# Patient Record
Sex: MALE | Race: WHITE | NOT HISPANIC OR LATINO | Employment: OTHER | URBAN - NONMETROPOLITAN AREA
[De-identification: names, ages, dates, MRNs, and addresses within clinical notes are randomized per-mention and may not be internally consistent; named-entity substitution may affect disease eponyms.]

---

## 2017-09-29 ENCOUNTER — OFFICE VISIT (OUTPATIENT)
Dept: URGENT CARE | Facility: PHYSICIAN GROUP | Age: 52
End: 2017-09-29
Payer: COMMERCIAL

## 2017-09-29 VITALS
OXYGEN SATURATION: 97 % | DIASTOLIC BLOOD PRESSURE: 90 MMHG | HEIGHT: 71 IN | SYSTOLIC BLOOD PRESSURE: 140 MMHG | HEART RATE: 92 BPM | RESPIRATION RATE: 14 BRPM | WEIGHT: 238 LBS | TEMPERATURE: 97.4 F | BODY MASS INDEX: 33.32 KG/M2

## 2017-09-29 DIAGNOSIS — I15.9 SECONDARY HYPERTENSION: ICD-10-CM

## 2017-09-29 PROCEDURE — 99203 OFFICE O/P NEW LOW 30 MIN: CPT | Performed by: PHYSICIAN ASSISTANT

## 2017-09-29 RX ORDER — LISINOPRIL AND HYDROCHLOROTHIAZIDE 25; 20 MG/1; MG/1
1 TABLET ORAL DAILY
COMMUNITY
End: 2017-10-26 | Stop reason: SDUPTHER

## 2017-09-29 RX ORDER — LISINOPRIL AND HYDROCHLOROTHIAZIDE 25; 20 MG/1; MG/1
1 TABLET ORAL DAILY
Qty: 30 TAB | Refills: 0 | Status: SHIPPED | OUTPATIENT
Start: 2017-09-29 | End: 2017-11-26

## 2017-09-29 NOTE — PROGRESS NOTES
Chief Complaint   Patient presents with   • Medication Refill     BP Meds       HISTORY OF PRESENT ILLNESS: Patient is a 52 y.o. male who presents today for the following:    Patient comes in for a blood pressure medication refill. He has been on lisinopril/hydrochlorothiazide 20/25 for the past few years. He tolerates the medication without any side effects. He took his last pill today but does not have an appointment until 10/9/17. He denies any chest pain, short of breath, orthopnea, PND, leg swelling.    There are no active problems to display for this patient.      Allergies:Review of patient's allergies indicates no known allergies.    Current Outpatient Prescriptions Ordered in [facility]   Medication Sig Dispense Refill   • lisinopril-hydrochlorothiazide (PRINZIDE, ZESTORETIC) 20-25 MG per tablet Take 1 Tab by mouth every day.     • lisinopril-hydrochlorothiazide (PRINZIDE, ZESTORETIC) 20-25 MG per tablet Take 1 Tab by mouth every day. 30 Tab 0     No current Epic-ordered facility-administered medications on file.        No past medical history on file.    Social History   Substance Use Topics   • Smoking status: Never Smoker   • Smokeless tobacco: Never Used   • Alcohol use Yes       No family status information on file.   History reviewed. No pertinent family history.    ROS:    Review of Systems   Constitutional: Negative for fever, chills, weight loss and malaise/fatigue.   HENT: Negative for ear pain, nosebleeds, congestion, sore throat and neck pain.    Eyes: Negative for blurred vision.   Respiratory: Negative for cough, sputum production, shortness of breath and wheezing.    Cardiovascular: Negative for chest pain, palpitations, orthopnea and leg swelling.   Gastrointestinal: Negative for heartburn, nausea, vomiting and abdominal pain.   Genitourinary: Negative for dysuria, urgency and frequency.       Exam:  Blood pressure 140/90, pulse 92, temperature 36.3 °C (97.4 °F), resp. rate 14, height 1.803 m (5'  "11\"), weight 108 kg (238 lb), SpO2 97 %.  General: Well developed, well nourished. No distress.  HEENT:Head is grossly normal.  Neck: Trachea midline, no masses. No thyromegaly. No carotid bruits noted.  Pulmonary: Clear to ausculation and percussion.  Normal effort. No rales, ronchi, or wheezing.   Cardiovascular: Regular rate and rhythm without murmur. No edema.   Neurologic: Grossly nonfocal.  Extremities: No lower extremity edema noted.  Skin: Warm, dry, good turgor. No rashes in visible areas.   Psych: Normal mood. Alert and oriented x3. Judgment and insight is normal.    Assessment/Plan:  Refill blood pressure medication for one month. Establish 10/9 scheduled.  1. Secondary hypertension  lisinopril-hydrochlorothiazide (PRINZIDE, ZESTORETIC) 20-25 MG per tablet       "

## 2017-10-26 RX ORDER — LISINOPRIL AND HYDROCHLOROTHIAZIDE 25; 20 MG/1; MG/1
1 TABLET ORAL DAILY
Qty: 30 TAB | Refills: 0 | Status: SHIPPED | OUTPATIENT
Start: 2017-10-26 | End: 2017-11-09

## 2017-10-26 NOTE — TELEPHONE ENCOUNTER
1 month refilled.     Appointment Information   Name: BREANNA AVILES MRN: [id number]   Date: 11/9/2017 Status: Mabel   Appt Time: 10:20 AM Length: 40   Visit Type: NEW PATIENT [[id number]] Copay: $0.00   Provider: Brisa Cho M.D. Department: [facility]   Referral Number:   Referral Status:     Notes: new to you   Made On: 9/25/2017 1:05 PM By: SARAY OSWALD [[id number]] (ES)   Appointment Instructions    Patient Instructions    Visit Type: NEW PATIENT - Please bring Photo ID, Insurance Cards, All Medication Bottles and copies of any legal documents (such as Living Will, Power of )  If speaking a language besides English please bring an adult .  Please arrive 30 minutes prior for check in and registration.  You will be receiving a confirmation call a few days before your appointment from our automated call confirmation system.    Panel:      Directions to Department    Department Location: [facility] - From [address], take [address] toward [place]   Turn slight right onto [id number]   Turn right onto [address]. Uzair Hurtado, PharmD

## 2017-10-26 NOTE — TELEPHONE ENCOUNTER
*Last OV of 09/29/17 was at an urgent care, pt has an upcoming appointment on 11/09/2017*  Was the patient seen in the last year in this department? No     Does patient have an active prescription for medications requested? No     Received Request Via: Pharmacy      Pt met protocol?: No Last OV 09/29/17  BP Readings from Last 1 Encounters:   09/29/17 140/90

## 2017-11-09 ENCOUNTER — OFFICE VISIT (OUTPATIENT)
Dept: MEDICAL GROUP | Facility: PHYSICIAN GROUP | Age: 52
End: 2017-11-09
Payer: COMMERCIAL

## 2017-11-09 ENCOUNTER — TELEPHONE (OUTPATIENT)
Dept: MEDICAL GROUP | Facility: PHYSICIAN GROUP | Age: 52
End: 2017-11-09

## 2017-11-09 VITALS
OXYGEN SATURATION: 98 % | SYSTOLIC BLOOD PRESSURE: 142 MMHG | BODY MASS INDEX: 34.02 KG/M2 | HEIGHT: 71 IN | TEMPERATURE: 98.3 F | HEART RATE: 89 BPM | DIASTOLIC BLOOD PRESSURE: 88 MMHG | WEIGHT: 243 LBS | RESPIRATION RATE: 16 BRPM

## 2017-11-09 DIAGNOSIS — Z11.59 ENCOUNTER FOR HEPATITIS C SCREENING TEST FOR LOW RISK PATIENT: ICD-10-CM

## 2017-11-09 DIAGNOSIS — R21 SKIN RASH: ICD-10-CM

## 2017-11-09 DIAGNOSIS — Z23 NEED FOR VACCINATION: ICD-10-CM

## 2017-11-09 DIAGNOSIS — Z79.891 CHRONIC USE OF OPIATE DRUGS THERAPEUTIC PURPOSES: ICD-10-CM

## 2017-11-09 DIAGNOSIS — E66.9 OBESITY (BMI 30-39.9): ICD-10-CM

## 2017-11-09 DIAGNOSIS — Z13.220 SCREENING FOR LIPID DISORDERS: ICD-10-CM

## 2017-11-09 DIAGNOSIS — I10 ESSENTIAL HYPERTENSION: ICD-10-CM

## 2017-11-09 PROCEDURE — 99214 OFFICE O/P EST MOD 30 MIN: CPT | Performed by: FAMILY MEDICINE

## 2017-11-09 RX ORDER — CLINDAMYCIN PHOSPHATE 11.9 MG/ML
SOLUTION TOPICAL
Qty: 1 BOTTLE | Refills: 3 | Status: SHIPPED | OUTPATIENT
Start: 2017-11-09 | End: 2019-04-19

## 2017-11-09 RX ORDER — HYDROCODONE BITARTRATE AND ACETAMINOPHEN 5; 325 MG/1; MG/1
1 TABLET ORAL
Qty: 90 TAB | Refills: 0 | Status: SHIPPED | OUTPATIENT
Start: 2017-11-09 | End: 2019-04-19

## 2017-11-09 RX ORDER — HYDROXYZINE 50 MG/1
50 TABLET, FILM COATED ORAL 3 TIMES DAILY PRN
Qty: 30 TAB | Refills: 3 | Status: SHIPPED | OUTPATIENT
Start: 2017-11-09 | End: 2019-04-19

## 2017-11-09 RX ORDER — HYDROCODONE BITARTRATE AND ACETAMINOPHEN 5; 325 MG/1; MG/1
1-2 TABLET ORAL EVERY 4 HOURS PRN
COMMUNITY
End: 2017-11-09 | Stop reason: SDUPTHER

## 2017-11-09 RX ORDER — CHLORHEXIDINE GLUCONATE 2 G/100ML
SOLUTION TOPICAL
Qty: 1 BOTTLE | Refills: 3 | Status: SHIPPED | OUTPATIENT
Start: 2017-11-09 | End: 2019-04-19

## 2017-11-09 ASSESSMENT — PATIENT HEALTH QUESTIONNAIRE - PHQ9: CLINICAL INTERPRETATION OF PHQ2 SCORE: 0

## 2017-11-09 NOTE — ASSESSMENT & PLAN NOTE
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   He has occasionally severe pain that he uses a norco 5/325 for  He does not use this on a daily basis and states that one rx will last a long time. Last rx was in march 2016.   Refill done today, if he needs further refills will check UDS and sign controlled substance agreement.     reviewed with no concerns.

## 2017-11-09 NOTE — ASSESSMENT & PLAN NOTE
Skin rash that started in summer in groin region, now spread over all body surfaces but not on his face.   Very itchy lesions which he has scratches till many are open.   maculopalar rash over arms, legs, abdomen, back.   Gets worse in hot shower and at bedtime. Symptoms not worse only at bedtime. Lesions not located in creases of fingers or concentrated at wrists.   Will treat with antihistamine, topical steroid to reduce inflammation and itching.   Will check baseline labs for TSH, blood sugars.    No new soaps, detergent or lotions. No changes in diet. No camping or travel.

## 2017-11-09 NOTE — ASSESSMENT & PLAN NOTE
Chronic condition, controlled on lisinopril hctz 20-25 daily  He has no chest pain or swelling in legs.   He is advised on weight loss, low salt diet, and regular exercise

## 2017-11-09 NOTE — PROGRESS NOTES
Subjective:   Chuy Moody is a 52 y.o. male here today for evaluation and management of:     Chronic use of opiate drugs therapeutic purposes  He has damaged discs in cervical spine.   He has occasionally severe pain that he uses a norco 5/325 for  He does not use this on a daily basis and states that one rx will last a long time. Last rx was in march 2016.   Refill done today, if he needs further refills will check UDS and sign controlled substance agreement.     reviewed with no concerns.         Essential hypertension  Chronic condition, controlled on lisinopril hctz 20-25 daily  He has no chest pain or swelling in legs.   He is advised on weight loss, low salt diet, and regular exercise    Skin rash  Skin rash that started in summer in groin region, now spread over all body surfaces but not on his face.   Very itchy lesions which he has scratches till many are open.   maculopalar rash over arms, legs, abdomen, back.   Gets worse in hot shower and at bedtime. Symptoms not worse only at bedtime. Lesions not located in creases of fingers or concentrated at wrists.   Will treat with antihistamine, topical steroid to reduce inflammation and itching.   Will check baseline labs for TSH, blood sugars.    No new soaps, detergent or lotions. No changes in diet. No camping or travel.      If labs all normal, will try permethrin, if no resolution, will try oral steroid burst and d/c one medication at a time to see if it is a medication reaction.     Current medicines (including changes today)  Current Outpatient Prescriptions   Medication Sig Dispense Refill   • hydrocodone-acetaminophen (NORCO) 5-325 MG Tab per tablet Take 1 Tab by mouth 1 time daily as needed. 90 Tab 0   • Chlorhexidine Gluconate 2 % Liquid Wash affected skin with medication once a day 1 Bottle 3   • clindamycin (CLEOCIN) 1 % Solution Use 5 ml to wash skin 1-2 times daily. 1 Bottle 3   • hydrOXYzine HCl (ATARAX) 50 MG Tab Take 1 Tab by mouth 3  "times a day as needed for Itching. 30 Tab 3   • lisinopril-hydrochlorothiazide (PRINZIDE, ZESTORETIC) 20-25 MG per tablet Take 1 Tab by mouth every day. 30 Tab 0     No current facility-administered medications for this visit.      He  has no past medical history on file.    ROS  No chest pain, no shortness of breath, no abdominal pain       Objective:     Blood pressure 142/88, pulse 89, temperature 36.8 °C (98.3 °F), resp. rate 16, height 1.803 m (5' 11\"), weight 110.2 kg (243 lb), SpO2 98 %. Body mass index is 33.89 kg/m².   Physical Exam:  Constitutional: Alert, no distress.  Skin: Warm, dry, good turgor, papular excoriated lesions with underlying   maculopalar rash over arms, legs, abdomen, back.     Eye: Equal, round and reactive, conjunctiva clear, lids normal.  ENMT: Lips without lesions, good dentition, oropharynx clear.  Neck: Trachea midline, no masses, no thyromegaly. No cervical or supraclavicular lymphadenopathy  Respiratory: Unlabored respiratory effort, lungs clear to auscultation, no wheezes, no ronchi.  Cardiovascular: Normal S1, S2, no murmur, no edema.  Abdomen: Soft, non-tender, no masses, no hepatosplenomegaly.  Psych: Alert and oriented x3, normal affect and mood.        Assessment and Plan:   The following treatment plan was discussed    1. Chronic use of opiate drugs therapeutic purposes  Refill done today for 90 pills of norco 5/325. Advised he cannot take with alcohol or benzodiazepines.   - CONTROLLED SUBSTANCE TREATMENT AGREEMENT    2. Essential hypertension  Chronic condition, controlled on lisinopril-hctz  - COMP METABOLIC PANEL; Future    3. Skin rash  Uncontrolled   Check baseline labs.   - TSH WITH REFLEX TO FT4; Future  - CBC WITH DIFFERENTIAL; Future  - COMP METABOLIC PANEL; Future  - HEP C VIRUS ANTIBODY; Future    4. Need for vaccination  - INFLUENZA VACCINE QUAD INJ >3Y(PF)    5. Obesity (BMI 30-39.9)  - Patient identified as having weight management issue.  Appropriate orders " and counseling given.  - LIPID PROFILE; Future  - TSH WITH REFLEX TO FT4; Future    6. Encounter for hepatitis C screening test for low risk patient  - HEP C VIRUS ANTIBODY; Future    7. Screening for lipid disorders  - LIPID PROFILE; Future      Followup: Return in about 3 months (around 2/9/2018) for HTN, rash, chronic neck pain.

## 2017-11-26 RX ORDER — LISINOPRIL AND HYDROCHLOROTHIAZIDE 25; 20 MG/1; MG/1
TABLET ORAL
Qty: 90 TAB | Refills: 0 | Status: SHIPPED | OUTPATIENT
Start: 2017-11-26 | End: 2018-02-22 | Stop reason: SDUPTHER

## 2018-05-22 NOTE — TELEPHONE ENCOUNTER
Was the patient seen in the last year in this department? Yes     Does patient have an active prescription for medications requested? No     Received Request Via: Pharmacy      Pt met protocol?: Yes    OV 11/17    BP Readings from Last 1 Encounters:   11/09/17 142/88

## 2018-05-23 RX ORDER — LISINOPRIL AND HYDROCHLOROTHIAZIDE 25; 20 MG/1; MG/1
TABLET ORAL
Qty: 30 TAB | Refills: 0 | Status: SHIPPED | OUTPATIENT
Start: 2018-05-23 | End: 2018-07-03 | Stop reason: SDUPTHER

## 2018-05-23 NOTE — TELEPHONE ENCOUNTER
Pt was told 2/18 to make appt and that has not been done. Please advise pt only 30 days will be sent to pharmacy and next request will be denied.

## 2018-06-21 NOTE — TELEPHONE ENCOUNTER
*Note on last rx for patient to make appointment for further refills*  Was the patient seen in the last year in this department? Yes     Does patient have an active prescription for medications requested? No     Received Request Via: Pharmacy    Pt met protocol?: No     Last OV 11/2017  BP Readings from Last 1 Encounters:   11/09/17 142/88

## 2018-06-22 RX ORDER — LISINOPRIL AND HYDROCHLOROTHIAZIDE 25; 20 MG/1; MG/1
TABLET ORAL
Refills: 0 | OUTPATIENT
Start: 2018-06-22

## 2018-06-22 NOTE — TELEPHONE ENCOUNTER
Patient has been told on multiple occasions to make appt and that has not happened. Med is denied and pharmacy is aware.

## 2018-06-25 ENCOUNTER — OFFICE VISIT (OUTPATIENT)
Dept: MEDICAL GROUP | Facility: PHYSICIAN GROUP | Age: 53
End: 2018-06-25
Payer: COMMERCIAL

## 2018-06-25 VITALS
HEART RATE: 78 BPM | DIASTOLIC BLOOD PRESSURE: 78 MMHG | SYSTOLIC BLOOD PRESSURE: 132 MMHG | RESPIRATION RATE: 16 BRPM | BODY MASS INDEX: 34.72 KG/M2 | TEMPERATURE: 97.6 F | OXYGEN SATURATION: 97 % | HEIGHT: 71 IN | WEIGHT: 248 LBS

## 2018-06-25 DIAGNOSIS — E78.5 DYSLIPIDEMIA: ICD-10-CM

## 2018-06-25 DIAGNOSIS — Z12.11 COLON CANCER SCREENING: ICD-10-CM

## 2018-06-25 DIAGNOSIS — Z23 NEED FOR TDAP VACCINATION: ICD-10-CM

## 2018-06-25 DIAGNOSIS — E66.9 OBESITY (BMI 30-39.9): ICD-10-CM

## 2018-06-25 DIAGNOSIS — I10 ESSENTIAL HYPERTENSION: ICD-10-CM

## 2018-06-25 PROBLEM — R21 SKIN RASH: Status: RESOLVED | Noted: 2017-11-09 | Resolved: 2018-06-25

## 2018-06-25 PROCEDURE — 99214 OFFICE O/P EST MOD 30 MIN: CPT | Mod: 25 | Performed by: FAMILY MEDICINE

## 2018-06-25 PROCEDURE — 90472 IMMUNIZATION ADMIN EACH ADD: CPT | Performed by: FAMILY MEDICINE

## 2018-06-25 PROCEDURE — 90471 IMMUNIZATION ADMIN: CPT | Performed by: FAMILY MEDICINE

## 2018-06-25 PROCEDURE — 90715 TDAP VACCINE 7 YRS/> IM: CPT | Performed by: FAMILY MEDICINE

## 2018-06-25 ASSESSMENT — PATIENT HEALTH QUESTIONNAIRE - PHQ9: CLINICAL INTERPRETATION OF PHQ2 SCORE: 0

## 2018-06-25 NOTE — ASSESSMENT & PLAN NOTE
Chronic condition, well controlled. Takes lisinopril-hctz 20-25 mg.   He has no chest pain or swelling on his legs.

## 2018-06-25 NOTE — PROGRESS NOTES
"Subjective:   Chuy Moody is a 53 y.o. male here today for evaluation and management of:     Essential hypertension  Chronic condition, well controlled. Takes lisinopril-hctz 20-25 mg.   He has no chest pain or swelling on his legs.     Obesity (BMI 30-39.9)  Encouraged weight loss, regular exercise.          Current medicines (including changes today)  Current Outpatient Prescriptions   Medication Sig Dispense Refill   • lisinopril-hydrochlorothiazide (PRINZIDE, ZESTORETIC) 20-25 MG per tablet TAKE 1 TABLET BY MOUTH EVERY DAY 30 Tab 0   • Chlorhexidine Gluconate 2 % Liquid Wash affected skin with medication once a day 1 Bottle 3   • clindamycin (CLEOCIN) 1 % Solution Use 5 ml to wash skin 1-2 times daily. 1 Bottle 3   • hydrocodone-acetaminophen (NORCO) 5-325 MG Tab per tablet Take 1 Tab by mouth 1 time daily as needed. (Patient not taking: Reported on 6/25/2018) 90 Tab 0   • hydrOXYzine HCl (ATARAX) 50 MG Tab Take 1 Tab by mouth 3 times a day as needed for Itching. (Patient not taking: Reported on 6/25/2018) 30 Tab 3     No current facility-administered medications for this visit.      He  has no past medical history on file.    ROS  No chest pain, no shortness of breath, no abdominal pain       Objective:     Blood pressure 132/78, pulse 78, temperature 36.4 °C (97.6 °F), resp. rate 16, height 1.803 m (5' 11\"), weight 112.5 kg (248 lb), SpO2 97 %. Body mass index is 34.59 kg/m².   Physical Exam:  Constitutional: Alert, no distress.  Skin: Warm, dry, good turgor, no rashes in visible areas.  Eye: Equal, round and reactive, conjunctiva clear, lids normal.  ENMT: Lips without lesions, good dentition, oropharynx clear.  Neck: Trachea midline, no masses, no thyromegaly. No cervical or supraclavicular lymphadenopathy  Respiratory: Unlabored respiratory effort, lungs clear to auscultation, no wheezes, no ronchi.  Cardiovascular: Normal S1, S2, no murmur, no edema.  Abdomen: Soft, non-tender, no masses, no " hepatosplenomegaly.  Psych: Alert and oriented x3, normal affect and mood.        Assessment and Plan:   The following treatment plan was discussed    1. Need for Tdap vaccination  today    2. Essential hypertension  Well controlled.     3. Obesity (BMI 30-39.9)  Advised on diet changes, gradual weight loss.       Followup: No Follow-up on file.

## 2018-07-05 RX ORDER — LISINOPRIL AND HYDROCHLOROTHIAZIDE 25; 20 MG/1; MG/1
TABLET ORAL
Qty: 90 TAB | Refills: 0 | Status: SHIPPED | OUTPATIENT
Start: 2018-07-05 | End: 2018-10-01 | Stop reason: SDUPTHER

## 2018-07-05 NOTE — TELEPHONE ENCOUNTER
Was the patient seen in the last year in this department? Yes     Does patient have an active prescription for medications requested? No     Received Request Via: Pharmacy      Pt met protocol?: Yes    OV 6/18     BP Readings from Last 1 Encounters:   06/25/18 132/78

## 2018-09-26 ENCOUNTER — HOSPITAL ENCOUNTER (OUTPATIENT)
Dept: LAB | Facility: MEDICAL CENTER | Age: 53
End: 2018-09-26
Attending: FAMILY MEDICINE
Payer: COMMERCIAL

## 2018-09-26 DIAGNOSIS — I10 ESSENTIAL HYPERTENSION: ICD-10-CM

## 2018-09-26 DIAGNOSIS — E66.9 OBESITY (BMI 30-39.9): ICD-10-CM

## 2018-09-26 DIAGNOSIS — Z11.59 ENCOUNTER FOR HEPATITIS C SCREENING TEST FOR LOW RISK PATIENT: ICD-10-CM

## 2018-09-26 DIAGNOSIS — R21 SKIN RASH: ICD-10-CM

## 2018-09-26 DIAGNOSIS — E78.5 DYSLIPIDEMIA: ICD-10-CM

## 2018-09-26 LAB
BASOPHILS # BLD AUTO: 0.8 % (ref 0–1.8)
BASOPHILS # BLD: 0.04 K/UL (ref 0–0.12)
EOSINOPHIL # BLD AUTO: 0.09 K/UL (ref 0–0.51)
EOSINOPHIL NFR BLD: 1.9 % (ref 0–6.9)
ERYTHROCYTE [DISTWIDTH] IN BLOOD BY AUTOMATED COUNT: 38 FL (ref 35.9–50)
HCT VFR BLD AUTO: 45 % (ref 42–52)
HCV AB SER QL: NEGATIVE
HGB BLD-MCNC: 16.1 G/DL (ref 14–18)
IMM GRANULOCYTES # BLD AUTO: 0.02 K/UL (ref 0–0.11)
IMM GRANULOCYTES NFR BLD AUTO: 0.4 % (ref 0–0.9)
LYMPHOCYTES # BLD AUTO: 1.98 K/UL (ref 1–4.8)
LYMPHOCYTES NFR BLD: 41.3 % (ref 22–41)
MCH RBC QN AUTO: 31.5 PG (ref 27–33)
MCHC RBC AUTO-ENTMCNC: 35.8 G/DL (ref 33.7–35.3)
MCV RBC AUTO: 88.1 FL (ref 81.4–97.8)
MONOCYTES # BLD AUTO: 0.62 K/UL (ref 0–0.85)
MONOCYTES NFR BLD AUTO: 12.9 % (ref 0–13.4)
NEUTROPHILS # BLD AUTO: 2.04 K/UL (ref 1.82–7.42)
NEUTROPHILS NFR BLD: 42.7 % (ref 44–72)
NRBC # BLD AUTO: 0 K/UL
NRBC BLD-RTO: 0 /100 WBC
PLATELET # BLD AUTO: 289 K/UL (ref 164–446)
PMV BLD AUTO: 10.6 FL (ref 9–12.9)
RBC # BLD AUTO: 5.11 M/UL (ref 4.7–6.1)
TSH SERPL DL<=0.005 MIU/L-ACNC: 2.94 UIU/ML (ref 0.38–5.33)
WBC # BLD AUTO: 4.8 K/UL (ref 4.8–10.8)

## 2018-09-26 PROCEDURE — 80053 COMPREHEN METABOLIC PANEL: CPT

## 2018-09-26 PROCEDURE — 36415 COLL VENOUS BLD VENIPUNCTURE: CPT

## 2018-09-26 PROCEDURE — 84443 ASSAY THYROID STIM HORMONE: CPT

## 2018-09-26 PROCEDURE — 80061 LIPID PANEL: CPT

## 2018-09-26 PROCEDURE — 85025 COMPLETE CBC W/AUTO DIFF WBC: CPT

## 2018-09-26 PROCEDURE — 86803 HEPATITIS C AB TEST: CPT

## 2018-09-27 LAB
ALBUMIN SERPL BCP-MCNC: 4.6 G/DL (ref 3.2–4.9)
ALBUMIN/GLOB SERPL: 1.6 G/DL
ALP SERPL-CCNC: 47 U/L (ref 30–99)
ALT SERPL-CCNC: 17 U/L (ref 2–50)
ANION GAP SERPL CALC-SCNC: 14 MMOL/L (ref 0–11.9)
AST SERPL-CCNC: 14 U/L (ref 12–45)
BILIRUB SERPL-MCNC: 0.8 MG/DL (ref 0.1–1.5)
BUN SERPL-MCNC: 12 MG/DL (ref 8–22)
CALCIUM SERPL-MCNC: 10 MG/DL (ref 8.5–10.5)
CHLORIDE SERPL-SCNC: 95 MMOL/L (ref 96–112)
CHOLEST SERPL-MCNC: 349 MG/DL (ref 100–199)
CO2 SERPL-SCNC: 25 MMOL/L (ref 20–33)
CREAT SERPL-MCNC: 0.98 MG/DL (ref 0.5–1.4)
FASTING STATUS PATIENT QL REPORTED: NORMAL
GLOBULIN SER CALC-MCNC: 2.9 G/DL (ref 1.9–3.5)
GLUCOSE SERPL-MCNC: 297 MG/DL (ref 65–99)
HDLC SERPL-MCNC: 50 MG/DL
LDLC SERPL CALC-MCNC: ABNORMAL MG/DL
POTASSIUM SERPL-SCNC: 3.9 MMOL/L (ref 3.6–5.5)
PROT SERPL-MCNC: 7.5 G/DL (ref 6–8.2)
SODIUM SERPL-SCNC: 134 MMOL/L (ref 135–145)
TRIGL SERPL-MCNC: 449 MG/DL (ref 0–149)

## 2018-10-02 RX ORDER — LISINOPRIL AND HYDROCHLOROTHIAZIDE 25; 20 MG/1; MG/1
TABLET ORAL
Qty: 90 TAB | Refills: 0 | Status: SHIPPED | OUTPATIENT
Start: 2018-10-02 | End: 2019-01-02 | Stop reason: SDUPTHER

## 2018-10-02 NOTE — TELEPHONE ENCOUNTER
Was the patient seen in the last year in this department? Yes    Does patient have an active prescription for medications requested? No     Received Request Via: Pharmacy      Pt met protocol?: Yes    LAST OV 06/25/2018    BP Readings from Last 1 Encounters:   06/25/18 132/78     Lab Results   Component Value Date/Time    CHOLSTRLTOT 349 (H) 09/26/2018 10:33 AM    LDL see below 09/26/2018 10:33 AM    HDL 50 09/26/2018 10:33 AM    TRIGLYCERIDE 449 (H) 09/26/2018 10:33 AM       Lab Results   Component Value Date/Time    SODIUM 134 (L) 09/26/2018 10:33 AM    POTASSIUM 3.9 09/26/2018 10:33 AM    CHLORIDE 95 (L) 09/26/2018 10:33 AM    CO2 25 09/26/2018 10:33 AM    GLUCOSE 297 (H) 09/26/2018 10:33 AM    BUN 12 09/26/2018 10:33 AM    CREATININE 0.98 09/26/2018 10:33 AM     Lab Results   Component Value Date/Time    ALKPHOSPHAT 47 09/26/2018 10:33 AM    ASTSGOT 14 09/26/2018 10:33 AM    ALTSGPT 17 09/26/2018 10:33 AM    TBILIRUBIN 0.8 09/26/2018 10:33 AM

## 2018-10-08 DIAGNOSIS — R73.01 ELEVATED FASTING GLUCOSE: ICD-10-CM

## 2019-01-02 NOTE — TELEPHONE ENCOUNTER
Was the patient seen in the last year in this department? Yes    Does patient have an active prescription for medications requested? No     Received Request Via: Pharmacy      Pt met protocol?: No, OV 6/18   BP Readings from Last 1 Encounters:   06/25/18 132/78

## 2019-01-03 RX ORDER — LISINOPRIL AND HYDROCHLOROTHIAZIDE 25; 20 MG/1; MG/1
1 TABLET ORAL
Qty: 90 TAB | Refills: 0 | Status: SHIPPED | OUTPATIENT
Start: 2019-01-03 | End: 2019-04-02 | Stop reason: SDUPTHER

## 2019-04-02 RX ORDER — LISINOPRIL AND HYDROCHLOROTHIAZIDE 25; 20 MG/1; MG/1
1 TABLET ORAL
Qty: 90 TAB | Refills: 1 | Status: SHIPPED | OUTPATIENT
Start: 2019-04-02 | End: 2019-09-26 | Stop reason: SDUPTHER

## 2019-04-02 NOTE — TELEPHONE ENCOUNTER
Refill X 6 months, sent to pharmacy.Pt. Seen in the last 6 months per protocol.   Lab Results   Component Value Date/Time    SODIUM 134 (L) 09/26/2018 10:33 AM    POTASSIUM 3.9 09/26/2018 10:33 AM    CHLORIDE 95 (L) 09/26/2018 10:33 AM    CO2 25 09/26/2018 10:33 AM    GLUCOSE 297 (H) 09/26/2018 10:33 AM    BUN 12 09/26/2018 10:33 AM    CREATININE 0.98 09/26/2018 10:33 AM

## 2019-04-19 ENCOUNTER — OFFICE VISIT (OUTPATIENT)
Dept: URGENT CARE | Facility: PHYSICIAN GROUP | Age: 54
End: 2019-04-19
Payer: COMMERCIAL

## 2019-04-19 VITALS
OXYGEN SATURATION: 97 % | BODY MASS INDEX: 33.47 KG/M2 | HEART RATE: 92 BPM | SYSTOLIC BLOOD PRESSURE: 150 MMHG | TEMPERATURE: 97.8 F | DIASTOLIC BLOOD PRESSURE: 90 MMHG | RESPIRATION RATE: 16 BRPM | WEIGHT: 240 LBS

## 2019-04-19 DIAGNOSIS — H66.91 ACUTE RIGHT OTITIS MEDIA: ICD-10-CM

## 2019-04-19 PROCEDURE — 99214 OFFICE O/P EST MOD 30 MIN: CPT | Performed by: FAMILY MEDICINE

## 2019-04-19 RX ORDER — AMOXICILLIN 875 MG/1
TABLET, COATED ORAL
Qty: 20 TAB | Refills: 0 | Status: SHIPPED | OUTPATIENT
Start: 2019-04-19 | End: 2020-01-02

## 2019-04-19 NOTE — PROGRESS NOTES
Chief Complaint:    Chief Complaint   Patient presents with   • Nasal Congestion     (R) ear pressure-crackles, sharp pains randomly        History of Present Illness:    This is a new problem. For 4 days, his right ear has fluctuating pain from no pain to up to 6/10 severity. Right ear is plugged and can hear crackling in right ear intermittently. Overall, ear problem is staying same. He had URI symptoms that started about a week ago. He has had ear infection in past that started after URI, last episode about 25 years ago. Has tolerated Amoxil in past.      Review of Systems:    Constitutional: Negative for fever, chills, and diaphoresis.   Eyes: Negative for change in vision, photophobia, pain, redness, and discharge.  ENT: See HPI.    Respiratory: Negative for cough, hemoptysis, sputum production, shortness of breath, wheezing, and stridor.    Cardiovascular: Negative for chest pain, palpitations, orthopnea, claudication, leg swelling, and PND.   Gastrointestinal: Negative for abdominal pain, nausea, vomiting, diarrhea, constipation, blood in stool, and melena.   Genitourinary: Negative for dysuria, urinary urgency, urinary frequency, hematuria, and flank pain.   Musculoskeletal: Negative for myalgias, joint pain, neck pain, and back pain.   Skin: Negative for rash and itching.   Neurological: Negative for dizziness, tingling, tremors, sensory change, speech change, focal weakness, seizures, loss of consciousness, and headaches.   Endo: Negative for polydipsia.   Heme: Does not bruise/bleed easily.   Psychiatric/Behavioral: Negative for depression, suicidal ideas, hallucinations, memory loss and substance abuse. The patient is not nervous/anxious and does not have insomnia.        Past Medical History:    Past Medical History:   Diagnosis Date   • Hypertension      Past Surgical History:    History reviewed. No pertinent surgical history.    Social History:    Social History     Social History   • Marital status:  Single     Spouse name: N/A   • Number of children: N/A   • Years of education: N/A     Occupational History   • Not on file.     Social History Main Topics   • Smoking status: Never Smoker   • Smokeless tobacco: Never Used   • Alcohol use Yes   • Drug use: No   • Sexual activity: Not on file     Other Topics Concern   • Not on file     Social History Narrative   • No narrative on file     Family History:    History reviewed. No pertinent family history.    Medications:    Current Outpatient Prescriptions on File Prior to Visit   Medication Sig Dispense Refill   • lisinopril-hydrochlorothiazide (PRINZIDE, ZESTORETIC) 20-25 MG per tablet Take 1 Tab by mouth every day. 90 Tab 1     No current facility-administered medications on file prior to visit.      Allergies:    No Known Allergies      Vitals:    Vitals:    04/19/19 1301   BP: 150/90   Pulse: 92   Resp: 16   Temp: 36.6 °C (97.8 °F)   SpO2: 97%   Weight: 108.9 kg (240 lb)       Physical Exam:    Constitutional: Vital signs reviewed. Appears well-developed and well-nourished. No acute distress.   Eyes: Sclera white, conjunctivae clear.  ENT: Right TM is mildly-moderately erythematous. External ears normal. External auditory canals normal without discharge. Left TM translucent and non-bulging. Hearing normal. Nasal mucosa pink. Lips/teeth are normal. Oral mucosa pink and moist. Posterior pharynx: WNL.  Neck: Neck supple.   Pulmonary/Chest: Respirations non-labored.   Lymph: Cervical nodes without tenderness or enlargement.  Musculoskeletal: Normal gait. Normal range of motion. No muscular atrophy or weakness.  Neurological: Alert and oriented to person, place, and time. Muscle tone normal. Coordination normal.   Skin: No rashes or lesions. Warm, dry, normal turgor.  Psychiatric: Normal mood and affect. Behavior is normal. Judgment and thought content normal.       Assessment / Plan:    1. Acute right otitis media  - amoxicillin (AMOXIL) 875 MG tablet; 1 TAB BY  MOUTH TWICE A DAY X 10 DAYS.  Dispense: 20 Tab; Refill: 0      Discussed with him DDX, management options, and risks, benefits, and alternatives to treatment plan agreed upon.    Agreeable to medication prescribed.    May take over-the-counter Ibuprofen or Acetaminophen (Tylenol) as needed for pain.    Discussed expected course of duration, time for improvement, and to seek follow-up in Emergency Room, urgent care, or with PCP if getting worse at any time or not improving within expected time frame.

## 2019-09-26 RX ORDER — LISINOPRIL AND HYDROCHLOROTHIAZIDE 25; 20 MG/1; MG/1
TABLET ORAL
Qty: 30 TAB | Refills: 0 | Status: SHIPPED | OUTPATIENT
Start: 2019-09-26 | End: 2019-10-27 | Stop reason: SDUPTHER

## 2019-09-26 NOTE — TELEPHONE ENCOUNTER
*PT NEEDS TO MAKE AN APPT AND GET LABS UPDATED*  Was the patient seen in the last year in this department? No     Does patient have an active prescription for medications requested? No     Received Request Via: Pharmacy      Pt met protocol?: No      LAST OV 06/25/2018    BP Readings from Last 1 Encounters:   04/19/19 150/90     Lab Results   Component Value Date/Time    CHOLSTRLTOT 349 (H) 09/26/2018 10:33 AM    LDL see below 09/26/2018 10:33 AM    HDL 50 09/26/2018 10:33 AM    TRIGLYCERIDE 449 (H) 09/26/2018 10:33 AM       Lab Results   Component Value Date/Time    SODIUM 134 (L) 09/26/2018 10:33 AM    POTASSIUM 3.9 09/26/2018 10:33 AM    CHLORIDE 95 (L) 09/26/2018 10:33 AM    CO2 25 09/26/2018 10:33 AM    GLUCOSE 297 (H) 09/26/2018 10:33 AM    BUN 12 09/26/2018 10:33 AM    CREATININE 0.98 09/26/2018 10:33 AM     Lab Results   Component Value Date/Time    ALKPHOSPHAT 47 09/26/2018 10:33 AM    ASTSGOT 14 09/26/2018 10:33 AM    ALTSGPT 17 09/26/2018 10:33 AM    TBILIRUBIN 0.8 09/26/2018 10:33 AM

## 2019-10-28 ENCOUNTER — TELEPHONE (OUTPATIENT)
Dept: MEDICAL GROUP | Facility: PHYSICIAN GROUP | Age: 54
End: 2019-10-28

## 2019-10-28 NOTE — TELEPHONE ENCOUNTER
Was the patient seen in the last year in this department? No     Does patient have an active prescription for medications requested? Yes    Received Request Via: Patient     Scheduled appt with Pt at next available in January.

## 2019-10-28 NOTE — TELEPHONE ENCOUNTER
lisinopril-hydrochlorothiazide (PRINZIDE, ZESTORETIC) 20-25 MG per tablet     Pt is requesting a refill on Rx lisinopril-hydrochlorothiazide. Pt states he has 10 left as of today. I made him a PCP appt for the 1st available on January 2nd. If possible, please send to [facility]. Thank you

## 2019-10-29 RX ORDER — LISINOPRIL AND HYDROCHLOROTHIAZIDE 25; 20 MG/1; MG/1
TABLET ORAL
Qty: 90 TAB | Refills: 0 | Status: SHIPPED | OUTPATIENT
Start: 2019-10-29 | End: 2020-01-02

## 2020-01-02 ENCOUNTER — OFFICE VISIT (OUTPATIENT)
Dept: MEDICAL GROUP | Facility: PHYSICIAN GROUP | Age: 55
End: 2020-01-02
Payer: COMMERCIAL

## 2020-01-02 VITALS
TEMPERATURE: 98.4 F | DIASTOLIC BLOOD PRESSURE: 96 MMHG | OXYGEN SATURATION: 97 % | SYSTOLIC BLOOD PRESSURE: 140 MMHG | RESPIRATION RATE: 16 BRPM | HEART RATE: 97 BPM | BODY MASS INDEX: 33.32 KG/M2 | WEIGHT: 238 LBS | HEIGHT: 71 IN

## 2020-01-02 DIAGNOSIS — Z23 NEED FOR VACCINATION: ICD-10-CM

## 2020-01-02 DIAGNOSIS — E66.9 OBESITY (BMI 30-39.9): ICD-10-CM

## 2020-01-02 DIAGNOSIS — Z12.12 SCREENING FOR COLORECTAL CANCER: ICD-10-CM

## 2020-01-02 DIAGNOSIS — I10 ESSENTIAL HYPERTENSION: ICD-10-CM

## 2020-01-02 DIAGNOSIS — E78.5 DYSLIPIDEMIA: ICD-10-CM

## 2020-01-02 DIAGNOSIS — Z12.11 SCREENING FOR COLORECTAL CANCER: ICD-10-CM

## 2020-01-02 PROCEDURE — 90471 IMMUNIZATION ADMIN: CPT | Performed by: FAMILY MEDICINE

## 2020-01-02 PROCEDURE — 90686 IIV4 VACC NO PRSV 0.5 ML IM: CPT | Performed by: FAMILY MEDICINE

## 2020-01-02 PROCEDURE — 99214 OFFICE O/P EST MOD 30 MIN: CPT | Mod: 25 | Performed by: FAMILY MEDICINE

## 2020-01-02 RX ORDER — HYDROCHLOROTHIAZIDE 25 MG/1
25 TABLET ORAL DAILY
Qty: 90 TAB | Refills: 3 | Status: SHIPPED | OUTPATIENT
Start: 2020-01-02 | End: 2020-12-28 | Stop reason: SDUPTHER

## 2020-01-02 RX ORDER — LISINOPRIL 40 MG/1
40 TABLET ORAL DAILY
Qty: 90 TAB | Refills: 3 | Status: SHIPPED | OUTPATIENT
Start: 2020-01-02 | End: 2020-12-28 | Stop reason: SDUPTHER

## 2020-01-02 ASSESSMENT — PATIENT HEALTH QUESTIONNAIRE - PHQ9: CLINICAL INTERPRETATION OF PHQ2 SCORE: 0

## 2020-01-02 NOTE — PROGRESS NOTES
"Subjective:   Chuy Moody is a 54 y.o. male here today for evaluation and management of:     Essential hypertension  bp borderline elevated 140/96. Does not check BP at home usually well controlled.   Has no chest pain, no swelling in legs.   Advised low salt diet.   Takes lisinopril hctz 20-25 mg, increasing lisinopril 40 mg and continue on hctz 25 mg.     Obesity (BMI 30-39.9)  Improving! Encouraged gradual weight loss with decreased portion sizes.          Current medicines (including changes today)  Current Outpatient Medications   Medication Sig Dispense Refill   • lisinopril (PRINIVIL) 40 MG tablet Take 1 Tab by mouth every day. 90 Tab 3   • hydroCHLOROthiazide (HYDRODIURIL) 25 MG Tab Take 1 Tab by mouth every day. 90 Tab 3     No current facility-administered medications for this visit.      He  has a past medical history of Hypertension.    ROS  No chest pain, no shortness of breath, no abdominal pain       Objective:     /96 (BP Location: Right arm, Patient Position: Sitting, BP Cuff Size: Adult)   Pulse 97   Temp 36.9 °C (98.4 °F) (Temporal)   Resp 16   Ht 1.803 m (5' 11\")   Wt 108 kg (238 lb)   SpO2 97%  Body mass index is 33.19 kg/m².   Physical Exam:  Constitutional: Alert, no distress.  Skin: Warm, dry, good turgor, no rashes in visible areas.  Eye: Equal, round and reactive, conjunctiva clear, lids normal.  ENMT: Lips without lesions, good dentition, oropharynx clear.  Neck: Trachea midline, no masses, no thyromegaly. No cervical or supraclavicular lymphadenopathy  Respiratory: Unlabored respiratory effort, lungs clear to auscultation, no wheezes, no ronchi.  Cardiovascular: Normal S1, S2, no murmur, no edema.  Abdomen: Soft, non-tender, no masses, no hepatosplenomegaly.  Psych: Alert and oriented x3, normal affect and mood.        Assessment and Plan:   The following treatment plan was discussed    1. Screening for colorectal cancer  - Occult Blood Feces Immunoassay (FIT); " Future    2. Need for vaccination  - Influenza Vaccine Quad Injection (PF)    3. Essential hypertension  Increase lisinopril to 40 mg  Continue hctz 25  - Comp Metabolic Panel; Future    4. Obesity (BMI 30-39.9)  Advised diet changes, increased activity for gradual weight loss.     5. Dyslipidemia  - Lipid Profile; Future      Followup: Return in about 3 months (around 4/2/2020) for HTN, dyslipidemia.

## 2020-01-02 NOTE — ASSESSMENT & PLAN NOTE
bp borderline elevated 140/96. Does not check BP at home usually well controlled.   Has no chest pain, no swelling in legs.   Advised low salt diet.   Takes lisinopril hctz 20-25 mg, increasing lisinopril 40 mg and continue on hctz 25 mg.

## 2020-01-03 ENCOUNTER — HOSPITAL ENCOUNTER (OUTPATIENT)
Facility: MEDICAL CENTER | Age: 55
End: 2020-01-03
Attending: FAMILY MEDICINE
Payer: COMMERCIAL

## 2020-01-03 PROCEDURE — 82274 ASSAY TEST FOR BLOOD FECAL: CPT

## 2020-01-07 DIAGNOSIS — Z12.11 SCREENING FOR COLORECTAL CANCER: ICD-10-CM

## 2020-01-07 DIAGNOSIS — Z12.12 SCREENING FOR COLORECTAL CANCER: ICD-10-CM

## 2020-01-07 LAB — HEMOCCULT STL QL IA: NEGATIVE

## 2020-01-27 RX ORDER — LISINOPRIL AND HYDROCHLOROTHIAZIDE 25; 20 MG/1; MG/1
TABLET ORAL
Qty: 90 TAB | Refills: 0 | OUTPATIENT
Start: 2020-01-27

## 2020-01-27 NOTE — TELEPHONE ENCOUNTER
Pt was increased in dose in lisinopril to 40mg and kept pt at hctz 25mg so split med up instead of being on the combo. Pharmacy was made aware.

## 2020-01-27 NOTE — TELEPHONE ENCOUNTER
*PT NEEDS TO GET LABS DONE*  *PHARMACY REQUESTED LISINOPRIL-HYDROCHLOROTHIAZIDE 20/25MG, PT CURRENT HAS RX FOR JUST LISINOPRIL 40MG. WHICH AN RX HAS BEEN SENT TO SAME REQUESTING PHARMACY ON 01/02/2020 FOR QTY:90 PLUS 3 ADDITIONAL REFILL*  Was the patient seen in the last year in this department? Yes    Does patient have an active prescription for medications requested? No     Received Request Via: Pharmacy      Pt met protocol?: NO    LAST OV 01/02/2020    BP Readings from Last 1 Encounters:   01/02/20 140/96     Lab Results   Component Value Date/Time    CHOLSTRLTOT 349 (H) 09/26/2018 10:33 AM    LDL see below 09/26/2018 10:33 AM    HDL 50 09/26/2018 10:33 AM    TRIGLYCERIDE 449 (H) 09/26/2018 10:33 AM       Lab Results   Component Value Date/Time    SODIUM 134 (L) 09/26/2018 10:33 AM    POTASSIUM 3.9 09/26/2018 10:33 AM    CHLORIDE 95 (L) 09/26/2018 10:33 AM    CO2 25 09/26/2018 10:33 AM    GLUCOSE 297 (H) 09/26/2018 10:33 AM    BUN 12 09/26/2018 10:33 AM    CREATININE 0.98 09/26/2018 10:33 AM     Lab Results   Component Value Date/Time    ALKPHOSPHAT 47 09/26/2018 10:33 AM    ASTSGOT 14 09/26/2018 10:33 AM    ALTSGPT 17 09/26/2018 10:33 AM    TBILIRUBIN 0.8 09/26/2018 10:33 AM

## 2020-12-28 NOTE — TELEPHONE ENCOUNTER
Received request via: Pharmacy    Was the patient seen in the last year in this department? Yes    Does the patient have an active prescription (recently filled or refills available) for medication(s) requested? No     Last OV 1/20/2020

## 2020-12-29 RX ORDER — HYDROCHLOROTHIAZIDE 25 MG/1
25 TABLET ORAL DAILY
Qty: 90 TAB | Refills: 0 | Status: SHIPPED | OUTPATIENT
Start: 2020-12-29 | End: 2021-05-03 | Stop reason: SDUPTHER

## 2020-12-29 RX ORDER — LISINOPRIL 40 MG/1
40 TABLET ORAL DAILY
Qty: 90 TAB | Refills: 0 | Status: SHIPPED | OUTPATIENT
Start: 2020-12-29 | End: 2021-05-03 | Stop reason: SDUPTHER

## 2021-05-03 NOTE — TELEPHONE ENCOUNTER
Received request via: Patient    Was the patient seen in the last year in this department? No     Does the patient have an active prescription (recently filled or refills available) for medication(s) requested? No     Patient has an appt on 5/11/2021 for his annual but he has only 4 pills left.    Last OV-1/2/2020  Last labs-1/7/2020

## 2021-05-04 RX ORDER — HYDROCHLOROTHIAZIDE 25 MG/1
25 TABLET ORAL DAILY
Qty: 90 TABLET | Refills: 3 | Status: SHIPPED | OUTPATIENT
Start: 2021-05-04 | End: 2022-05-03 | Stop reason: SDUPTHER

## 2021-05-04 RX ORDER — LISINOPRIL 40 MG/1
40 TABLET ORAL DAILY
Qty: 90 TABLET | Refills: 3 | Status: SHIPPED | OUTPATIENT
Start: 2021-05-04 | End: 2022-04-25 | Stop reason: SDUPTHER

## 2021-05-13 ENCOUNTER — OFFICE VISIT (OUTPATIENT)
Dept: MEDICAL GROUP | Facility: PHYSICIAN GROUP | Age: 56
End: 2021-05-13
Payer: COMMERCIAL

## 2021-05-13 VITALS
HEART RATE: 103 BPM | WEIGHT: 232 LBS | TEMPERATURE: 98.4 F | BODY MASS INDEX: 31.42 KG/M2 | RESPIRATION RATE: 16 BRPM | OXYGEN SATURATION: 98 % | DIASTOLIC BLOOD PRESSURE: 74 MMHG | SYSTOLIC BLOOD PRESSURE: 108 MMHG | HEIGHT: 72 IN

## 2021-05-13 DIAGNOSIS — I10 ESSENTIAL HYPERTENSION: ICD-10-CM

## 2021-05-13 DIAGNOSIS — Z12.12 SCREENING FOR COLORECTAL CANCER: ICD-10-CM

## 2021-05-13 DIAGNOSIS — Z12.11 SCREENING FOR COLORECTAL CANCER: ICD-10-CM

## 2021-05-13 DIAGNOSIS — E78.5 DYSLIPIDEMIA: ICD-10-CM

## 2021-05-13 DIAGNOSIS — R73.01 ELEVATED FASTING GLUCOSE: ICD-10-CM

## 2021-05-13 DIAGNOSIS — E66.9 OBESITY (BMI 30-39.9): ICD-10-CM

## 2021-05-13 PROBLEM — Z79.891 CHRONIC USE OF OPIATE DRUG FOR THERAPEUTIC PURPOSE: Status: RESOLVED | Noted: 2017-11-09 | Resolved: 2021-05-13

## 2021-05-13 PROCEDURE — 99396 PREV VISIT EST AGE 40-64: CPT | Performed by: FAMILY MEDICINE

## 2021-05-13 RX ORDER — LISINOPRIL AND HYDROCHLOROTHIAZIDE 25; 20 MG/1; MG/1
TABLET ORAL
COMMUNITY
End: 2021-05-13

## 2021-05-13 RX ORDER — HYDROCODONE BITARTRATE AND ACETAMINOPHEN 5; 325 MG/1; MG/1
TABLET ORAL
COMMUNITY
End: 2021-05-13

## 2021-05-13 RX ORDER — HYDROCHLOROTHIAZIDE 25 MG/1
TABLET ORAL
COMMUNITY
End: 2021-05-13

## 2021-05-13 ASSESSMENT — PATIENT HEALTH QUESTIONNAIRE - PHQ9: CLINICAL INTERPRETATION OF PHQ2 SCORE: 0

## 2021-05-13 NOTE — PATIENT INSTRUCTIONS
Fasting labs in 6 months    Mediterranean Diet  A Mediterranean diet refers to food and lifestyle choices that are based on the traditions of countries located on the [place]. This way of eating has been shown to help prevent certain conditions and improve outcomes for people who have chronic diseases, like kidney disease and heart disease.  What are tips for following this plan?  Lifestyle  · Cook and eat meals together with your family, when possible.  · Drink enough fluid to keep your urine clear or pale yellow.  · Be physically active every day. This includes:  ? Aerobic exercise like running or swimming.  ? Leisure activities like gardening, walking, or housework.  · Get 7-8 hours of sleep each night.  · If recommended by your health care provider, drink red wine in moderation. This means 1 glass a day for nonpregnant women and 2 glasses a day for men. A glass of wine equals 5 oz (150 mL).  Reading food labels    · Check the serving size of packaged foods. For foods such as rice and pasta, the serving size refers to the amount of cooked product, not dry.  · Check the total fat in packaged foods. Avoid foods that have saturated fat or trans fats.  · Check the ingredients list for added sugars, such as corn syrup.  Shopping  · At the grocery store, buy most of your food from the areas near the walls of the store. This includes:  ? Fresh fruits and vegetables (produce).  ? Grains, beans, nuts, and seeds. Some of these may be available in unpackaged forms or large amounts (in bulk).  ? Fresh seafood.  ? Poultry and eggs.  ? Low-fat dairy products.  · Buy whole ingredients instead of prepackaged foods.  · Buy fresh fruits and vegetables in-season from local farmers markets.  · Buy frozen fruits and vegetables in resealable bags.  · If you do not have access to quality fresh seafood, buy precooked frozen shrimp or canned fish, such as tuna, salmon, or sardines.  · Buy small amounts of raw or cooked  vegetables, salads, or olives from the deli or salad bar at your store.  · Stock your pantry so you always have certain foods on hand, such as olive oil, canned tuna, canned tomatoes, rice, pasta, and beans.  Cooking  · Cook foods with extra-virgin olive oil instead of using butter or other vegetable oils.  · Have meat as a side dish, and have vegetables or grains as your main dish. This means having meat in small portions or adding small amounts of meat to foods like pasta or stew.  · Use beans or vegetables instead of meat in common dishes like chili or lasagna.  · [place] with different cooking methods. Try roasting or broiling vegetables instead of steaming or sautéeing them.  · Add frozen vegetables to soups, stews, pasta, or rice.  · Add nuts or seeds for added healthy fat at each meal. You can add these to yogurt, salads, or vegetable dishes.  · Marinate fish or vegetables using olive oil, lemon juice, garlic, and fresh herbs.  Meal planning    · Plan to eat 1 vegetarian meal one day each week. Try to work up to 2 vegetarian meals, if possible.  · Eat seafood 2 or more times a week.  · Have healthy snacks readily available, such as:  ? Vegetable sticks with hummus.  ? Greek yogurt.  ? Fruit and nut trail mix.  · Eat balanced meals throughout the week. This includes:  ? Fruit: 2-3 servings a day  ? Vegetables: 4-5 servings a day  ? Low-fat dairy: 2 servings a day  ? Fish, poultry, or lean meat: 1 serving a day  ? Beans and legumes: 2 or more servings a week  ? Nuts and seeds: 1-2 servings a day  ? Whole grains: 6-8 servings a day  ? Extra-virgin olive oil: 3-4 servings a day  · Limit red meat and sweets to only a few servings a month  What are my food choices?  · Mediterranean diet  ? Recommended  § Grains: Whole-grain pasta. Brown rice. Bulgar wheat. Polenta. Couscous. Whole-wheat bread. Oatmeal. Quinoa.  § Vegetables: Artichokes. Beets. Broccoli. Cabbage. Carrots. Eggplant. Green beans. Chard. Kale.  Spinach. Onions. Leeks. Peas. Squash. Tomatoes. Peppers. Radishes.  § Fruits: Apples. Apricots. Avocado. Berries. Bananas. Cherries. Dates. Figs. Grapes. Kerline. Melon. Oranges. Peaches. Plums. Pomegranate.  § Meats and other protein foods: Beans. Almonds. Sunflower seeds. Pine nuts. Peanuts. Cod. Chap[place]. Scallops. Shrimp. Tuna. Tilapia. Clams. Oysters. Eggs.  § Dairy: Low-fat milk. Cheese. Greek yogurt.  § Beverages: Water. Red wine. Herbal tea.  § Fats and oils: Extra virgin olive oil. Avocado oil. Grape seed oil.  § Sweets and desserts: Greek yogurt with honey. Baked apples. Poached pears. Trail mix.  § Seasoning and other foods: Basil. Cilantro. Coriander. Cumin. Mint. Parsley. Omar. Rosemary. Tarragon. Garlic. Oregano. Thyme. Pepper. Balsalmic vinegar. Tahini. Hummus. Tomato sauce. Olives. Mushrooms.  ? Limit these  § Grains: Prepackaged pasta or rice dishes. Prepackaged cereal with added sugar.  § Vegetables: Deep fried potatoes (french fries).  § Fruits: Fruit canned in syrup.  § Meats and other protein foods: Beef. Pork. Lamb. Poultry with skin. Hot dogs. Sethi.  § Dairy: Ice cream. Sour cream. Whole milk.  § Beverages: Juice. Sugar-sweetened soft drinks. Beer. Liquor and spirits.  § Fats and oils: Butter. Canola oil. Vegetable oil. Beef fat (tallow). Lard.  § Sweets and desserts: Cookies. Cakes. Pies. Candy.  § Seasoning and other foods: Mayonnaise. Premade sauces and marinades.  The items listed may not be a complete list. Talk with your dietitian about what dietary choices are right for you.  Summary  · The Mediterranean diet includes both food and lifestyle choices.  · Eat a variety of fresh fruits and vegetables, beans, nuts, seeds, and whole grains.  · Limit the amount of red meat and sweets that you eat.  · Talk with your health care provider about whether it is safe for you to drink red wine in moderation. This means 1 glass a day for nonpregnant women and 2 glasses a day for men. A glass of wine  equals 5 oz (150 mL).  This information is not intended to replace advice given to you by your health care provider. Make sure you discuss any questions you have with your health care provider.  Document Released: 08/10/2017 Document Revised: 08/17/2017 Document Reviewed: 08/10/2017  Elsevier Patient Education © 2020 [employer]

## 2021-05-13 NOTE — PROGRESS NOTES
Subjective:   Chuy Moody is a 56 y.o. male here today for evaluation and management of:     Dyslipidemia  Elevated TG, LDL not calculable on last labs  Repeat lab orders provided, encouraged pt to get the labs done.   Advised on diet and lifestyle changes for lowering cholesterol, TG  He has been eating more healthy foods, fruit, vegetables.       Essential hypertension  Well controlled  Takes lisinopril 40 mg and hctz 25 mg  He has no CP, SOB or LE edema.       Obesity (BMI 30-39.9)  Has been intentionally losing weight gradually.   Diet changes, regular activity.          Current medicines (including changes today)  Current Outpatient Medications   Medication Sig Dispense Refill   • hydroCHLOROthiazide (HYDRODIURIL) 25 MG Tab Take 1 tablet by mouth every day. Please keep your appointment as scheduled with Dr. Cho on 5/13/2021 90 tablet 3   • lisinopril (PRINIVIL) 40 MG tablet Take 1 tablet by mouth every day. 90 tablet 3     No current facility-administered medications for this visit.     He  has a past medical history of Hypertension.    ROS  No chest pain, no shortness of breath, no abdominal pain       Objective:     /74   Pulse (!) 103   Temp 36.9 °C (98.4 °F) (Temporal)   Resp 16   Ht 1.829 m (6')   Wt 105 kg (232 lb)   SpO2 98%  Body mass index is 31.46 kg/m².   Physical Exam:  Constitutional: Alert, no distress.  Skin: Warm, dry, good turgor, no rashes in visible areas.  Eye: Equal, round and reactive, conjunctiva clear, lids normal.  ENMT: Lips without lesions, good dentition, oropharynx clear.  Neck: Trachea midline, no masses, no thyromegaly. No cervical or supraclavicular lymphadenopathy  Respiratory: Unlabored respiratory effort, lungs clear to auscultation, no wheezes, no ronchi.  Cardiovascular: Normal S1, S2, no murmur, no edema.  Abdomen: Soft, non-tender, no masses, no hepatosplenomegaly.  Psych: Alert and oriented x3, normal affect and mood.        Assessment and Plan:   The  following treatment plan was discussed    1. Screening for colorectal cancer    - COLOGUARD (FIT DNA)    2. Obesity (BMI 30-39.9)    - Patient identified as having weight management issue.  Appropriate orders and counseling given.    3. Dyslipidemia      4. Essential hypertension        Followup: Return in about 6 months (around 11/13/2021) for dyslipidemia, HTN.

## 2021-05-13 NOTE — ASSESSMENT & PLAN NOTE
Elevated TG, LDL not calculable on last labs  Repeat lab orders provided, encouraged pt to get the labs done.   Advised on diet and lifestyle changes for lowering cholesterol, TG  He has been eating more healthy foods, fruit, vegetables.   Advised to limit beer to one or less a day

## 2021-06-08 ENCOUNTER — TELEPHONE (OUTPATIENT)
Dept: MEDICAL GROUP | Facility: PHYSICIAN GROUP | Age: 56
End: 2021-06-08

## 2021-06-08 NOTE — TELEPHONE ENCOUNTER
----- Message from Brisa Cho M.D. sent at 6/8/2021  2:39 PM PDT -----  Please let pt know cologuard was normal! Next is due in 3 years.   Dr Cho

## 2022-04-26 RX ORDER — LISINOPRIL 40 MG/1
40 TABLET ORAL DAILY
Qty: 90 TABLET | Refills: 3 | Status: SHIPPED | OUTPATIENT
Start: 2022-04-26 | End: 2023-05-01 | Stop reason: SDUPTHER

## 2022-05-03 RX ORDER — HYDROCHLOROTHIAZIDE 25 MG/1
25 TABLET ORAL DAILY
Qty: 90 TABLET | Refills: 3 | Status: SHIPPED | OUTPATIENT
Start: 2022-05-03 | End: 2023-05-01 | Stop reason: SDUPTHER

## 2023-05-01 RX ORDER — HYDROCHLOROTHIAZIDE 25 MG/1
25 TABLET ORAL DAILY
Qty: 90 TABLET | Refills: 3 | Status: SHIPPED | OUTPATIENT
Start: 2023-05-01 | End: 2024-03-14 | Stop reason: SDUPTHER

## 2023-05-01 RX ORDER — LISINOPRIL 40 MG/1
40 TABLET ORAL DAILY
Qty: 90 TABLET | Refills: 3 | Status: SHIPPED | OUTPATIENT
Start: 2023-05-01 | End: 2024-03-14 | Stop reason: SDUPTHER

## 2023-05-01 NOTE — TELEPHONE ENCOUNTER
Received request via: Patient    Was the patient seen in the last year in this department? No    Does the patient have an active prescription (recently filled or refills available) for medication(s) requested? No    Does the patient have [facility] and need 100 day supply (blood pressure, diabetes and cholesterol meds only)? Patient does not have SCP      Last office vistio 05/13/2021    Pt has appointment on 05 03 2023 w/ Dr Morales

## 2023-05-03 ENCOUNTER — OFFICE VISIT (OUTPATIENT)
Dept: MEDICAL GROUP | Facility: PHYSICIAN GROUP | Age: 58
End: 2023-05-03
Payer: COMMERCIAL

## 2023-05-03 VITALS
BODY MASS INDEX: 31.97 KG/M2 | RESPIRATION RATE: 20 BRPM | HEIGHT: 72 IN | HEART RATE: 97 BPM | DIASTOLIC BLOOD PRESSURE: 76 MMHG | OXYGEN SATURATION: 95 % | WEIGHT: 236 LBS | SYSTOLIC BLOOD PRESSURE: 124 MMHG | TEMPERATURE: 96.7 F

## 2023-05-03 DIAGNOSIS — Z12.5 PROSTATE CANCER SCREENING: ICD-10-CM

## 2023-05-03 DIAGNOSIS — R73.01 IMPAIRED FASTING GLUCOSE: ICD-10-CM

## 2023-05-03 DIAGNOSIS — E78.5 DYSLIPIDEMIA: ICD-10-CM

## 2023-05-03 DIAGNOSIS — I10 ESSENTIAL HYPERTENSION: ICD-10-CM

## 2023-05-03 PROCEDURE — 99213 OFFICE O/P EST LOW 20 MIN: CPT | Performed by: STUDENT IN AN ORGANIZED HEALTH CARE EDUCATION/TRAINING PROGRAM

## 2023-05-03 ASSESSMENT — ENCOUNTER SYMPTOMS
DIZZINESS: 0
PALPITATIONS: 0
COUGH: 0
SHORTNESS OF BREATH: 0
CHILLS: 0
ABDOMINAL PAIN: 0
FEVER: 0
FOCAL WEAKNESS: 0
WHEEZING: 0
HEADACHES: 0
ORTHOPNEA: 0

## 2023-05-03 ASSESSMENT — PATIENT HEALTH QUESTIONNAIRE - PHQ9: CLINICAL INTERPRETATION OF PHQ2 SCORE: 0

## 2023-05-03 NOTE — PROGRESS NOTES
HISTORY OF PRESENT ILLNESS: Chuy is a pleasant 58 y.o. male, established patient who presents today to discuss medical problems as listed below:    Problem   Dyslipidemia   Impaired Fasting Glucose   Essential Hypertension    Has not been seen in two years. Takes hctz 25 and lisinopril 40mg daily. Does not check bp at home.     Feels well and has no concerns today          Current Outpatient Medications Ordered in Epic   Medication Sig Dispense Refill    lisinopril (PRINIVIL) 40 MG tablet Take 1 Tablet by mouth every day. 90 Tablet 3    hydroCHLOROthiazide (HYDRODIURIL) 25 MG Tab Take 1 Tablet by mouth every day. Please keep your appointment as scheduled with Dr. Cho on 5/13/2021 90 Tablet 3     No current Epic-ordered facility-administered medications on file.       Review of systems:  Review of Systems   Constitutional:  Negative for chills and fever.   Respiratory:  Negative for cough, shortness of breath and wheezing.    Cardiovascular:  Negative for chest pain, palpitations, orthopnea and leg swelling.   Gastrointestinal:  Negative for abdominal pain.   Genitourinary:  Negative for dysuria, frequency and urgency.   Musculoskeletal:  Negative for joint pain.   Neurological:  Negative for dizziness, focal weakness and headaches.       History reviewed. No pertinent past medical history.  History reviewed. No pertinent surgical history.  Social History     Tobacco Use    Smoking status: Never    Smokeless tobacco: Never   Substance Use Topics    Alcohol use: Yes    Drug use: No      History reviewed. No pertinent family history.  Current Outpatient Medications   Medication Sig Dispense Refill    lisinopril (PRINIVIL) 40 MG tablet Take 1 Tablet by mouth every day. 90 Tablet 3    hydroCHLOROthiazide (HYDRODIURIL) 25 MG Tab Take 1 Tablet by mouth every day. Please keep your appointment as scheduled with Dr. Cho on 5/13/2021 90 Tablet 3     No current facility-administered medications for this visit.        Allergies:  No Known Allergies    Allergies, past medical history, past surgical history, family history, social history reviewed and updated.    Objective:    /76   Pulse 97   Temp 35.9 °C (96.7 °F) (Temporal)   Resp 20   Ht 1.829 m (6')   Wt 107 kg (236 lb)   SpO2 95%   BMI 32.01 kg/m²    Body mass index is 32.01 kg/m².    Physical exam:  General: Normal appearance, no acute distress, not ill-appearing  HEENT: EOM intact, conjunctiva normal limits, negative right/left eye discharge.  Sclera anicteric  Cardiovascular: Normal rate and rhythm, no murmurs  Pulmonary: No respiratory distress, no wheezing, no rales, breath sounds normal.  Musculoskeletal: No edema bilaterally  Skin: Warm, dry, no lesions  Neurological: No focal deficits, normal gait  Psychiatric: Mood within normal limits    Assessment/Plan:    Problem List Items Addressed This Visit       Essential hypertension     Blood pressure well controlled in office today.  Continue hydrochlorothiazide 25 mg daily, lisinopril 40 mg daily.  Metabolic panel ordered, follow-up results.  Chronic, stable condition    Discussed low-salt diet, exercise         Relevant Orders    Comp Metabolic Panel    CBC WITHOUT DIFFERENTIAL    Dyslipidemia     Labs ordered         Relevant Orders    Lipid Profile    Impaired fasting glucose     Patient has had a fasting glucose of 297 in the past.  Denies any polyuria, polydipsia.  Hemoglobin A1c ordered, follow-up results         Relevant Orders    HEMOGLOBIN A1C     Other Visit Diagnoses       Prostate cancer screening        Relevant Orders    PROSTATE SPECIFIC AG SCREENING             Return in about 4 weeks (around 5/31/2023) for labs.

## 2023-05-03 NOTE — ASSESSMENT & PLAN NOTE
Blood pressure well controlled in office today.  Continue hydrochlorothiazide 25 mg daily, lisinopril 40 mg daily.  Metabolic panel ordered, follow-up results.  Chronic, stable condition    Discussed low-salt diet, exercise

## 2023-05-03 NOTE — LETTER
[facility]  Brisa Cho M.D.  [address] [place]  [place] [address]  Fax: [phone]   Authorization for Release/Disclosure of   Protected Health Information   Name: CHUY AVILES : 1965 SSN: [id number]   Address: [address] [place] [address] Phone:    [phone] (home)    I authorize the entity listed below to release/disclose the PHI below to:   [facility]/Brisa Cho M.D. and Cruz Morales D.O.   Provider or Entity Name:  [place], [place]   Address   City, State, Zip   Phone:      Fax:     Reason for request: continuity of care   Information to be released:    [  ] LAST COLONOSCOPY,  including any PATH REPORT and follow-up  [  ] LAST FIT/COLOGUARD RESULT [  ] LAST DEXA  [  ] LAST MAMMOGRAM  [  ] LAST PAP  [  ] LAST LABS [  ] RETINA EXAM REPORT  [  ] IMMUNIZATION RECORDS  [  ] Release all info    IMAGING REPORTS - MRI NECK      [  ] Check here and initial the line next to each item to release ALL health information INCLUDING  _____ Care and treatment for drug and / or alcohol abuse  _____ HIV testing, infection status, or AIDS  _____ Genetic Testing    DATES OF SERVICE OR TIME PERIOD TO BE DISCLOSED: _____________  I understand and acknowledge that:  * This Authorization may be revoked at any time by you in writing, except if your health information has already been used or disclosed.  * Your health information that will be used or disclosed as a result of you signing this authorization could be re-disclosed by the recipient. If this occurs, your re-disclosed health information may no longer be protected by State or Federal laws.  * You may refuse to sign this Authorization. Your refusal will not affect your ability to obtain treatment.  * This Authorization becomes effective upon signing and will  on (date) __________.      If no date is indicated, this Authorization will  one (1) year from the signature date.    Name: Chuy Aviles  Signature: Date:    5/3/2023     PLEASE FAX REQUESTED RECORDS BACK TO: [phone]

## 2023-05-03 NOTE — ASSESSMENT & PLAN NOTE
Patient has had a fasting glucose of 297 in the past.  Denies any polyuria, polydipsia.  Hemoglobin A1c ordered, follow-up results

## 2024-03-14 ENCOUNTER — OFFICE VISIT (OUTPATIENT)
Dept: URGENT CARE | Facility: PHYSICIAN GROUP | Age: 59
End: 2024-03-14
Payer: COMMERCIAL

## 2024-03-14 VITALS
DIASTOLIC BLOOD PRESSURE: 78 MMHG | RESPIRATION RATE: 18 BRPM | BODY MASS INDEX: 32.78 KG/M2 | TEMPERATURE: 97.8 F | OXYGEN SATURATION: 98 % | SYSTOLIC BLOOD PRESSURE: 138 MMHG | HEIGHT: 72 IN | HEART RATE: 90 BPM | WEIGHT: 242 LBS

## 2024-03-14 DIAGNOSIS — I10 ESSENTIAL HYPERTENSION: ICD-10-CM

## 2024-03-14 PROCEDURE — 99214 OFFICE O/P EST MOD 30 MIN: CPT | Performed by: PHYSICIAN ASSISTANT

## 2024-03-14 PROCEDURE — 3075F SYST BP GE 130 - 139MM HG: CPT | Performed by: PHYSICIAN ASSISTANT

## 2024-03-14 PROCEDURE — 3078F DIAST BP <80 MM HG: CPT | Performed by: PHYSICIAN ASSISTANT

## 2024-03-14 RX ORDER — LISINOPRIL 40 MG/1
40 TABLET ORAL DAILY
Qty: 90 TABLET | Refills: 3 | Status: SHIPPED | OUTPATIENT
Start: 2024-03-14

## 2024-03-14 RX ORDER — LISINOPRIL AND HYDROCHLOROTHIAZIDE 25; 20 MG/1; MG/1
1 TABLET ORAL
COMMUNITY
End: 2024-03-14

## 2024-03-14 RX ORDER — HYDROCHLOROTHIAZIDE 25 MG/1
25 TABLET ORAL DAILY
Qty: 90 TABLET | Refills: 3 | Status: SHIPPED | OUTPATIENT
Start: 2024-03-14

## 2024-03-14 ASSESSMENT — ENCOUNTER SYMPTOMS
CONSTITUTIONAL NEGATIVE: 1
GASTROINTESTINAL NEGATIVE: 1
EYES NEGATIVE: 1
RESPIRATORY NEGATIVE: 1
CARDIOVASCULAR NEGATIVE: 1
NEUROLOGICAL NEGATIVE: 1

## 2024-03-14 NOTE — PROGRESS NOTES
Subjective     Chuy Moody is a very pleasant 58 y.o. male who presents with Medication Refill (Bp meds)            HPI  Patient requesting refill hydrochlorothiazide and lisinopril for hypertension.  Has been on medication for years without issues.  He has yet to run out of his meds but is planning ahead.  He states he tolerates the medication well with no concerns.  Eating and drinking normal.  No acute symptoms today.      PMH:  has no past medical history on file.  MEDS:   Current Outpatient Medications:     hydroCHLOROthiazide 25 MG Tab, Take 1 Tablet by mouth every day., Disp: 90 Tablet, Rfl: 3    lisinopril (PRINIVIL) 40 MG tablet, Take 1 Tablet by mouth every day., Disp: 90 Tablet, Rfl: 3  ALLERGIES: No Known Allergies  SURGHX: No past surgical history on file.  SOCHX:  reports that he has never smoked. He has never used smokeless tobacco. He reports current alcohol use. He reports that he does not use drugs.  FH: family history is not on file.      Review of Systems   Constitutional: Negative.    Eyes: Negative.    Respiratory: Negative.     Cardiovascular: Negative.    Gastrointestinal: Negative.    Genitourinary: Negative.    Neurological: Negative.        Medications, Allergies, and current problem list reviewed today in Epic           Objective     /78   Pulse 90   Temp 36.6 °C (97.8 °F) (Temporal)   Resp 18   Ht 1.829 m (6')   Wt 110 kg (242 lb)   SpO2 98%   BMI 32.82 kg/m²      Physical Exam  Vitals and nursing note reviewed.   Constitutional:       General: He is not in acute distress.     Appearance: Normal appearance. He is well-developed. He is not ill-appearing, toxic-appearing or diaphoretic.   HENT:      Head: Normocephalic and atraumatic.      Right Ear: Hearing and external ear normal.      Left Ear: Hearing and external ear normal.      Nose: Nose normal.      Mouth/Throat:      Mouth: Mucous membranes are moist.   Eyes:      General:         Right eye: No discharge.          Left eye: No discharge.      Conjunctiva/sclera: Conjunctivae normal.   Cardiovascular:      Rate and Rhythm: Normal rate and regular rhythm.      Pulses: Normal pulses.      Heart sounds: Normal heart sounds.   Pulmonary:      Effort: Pulmonary effort is normal. No respiratory distress.      Breath sounds: Normal breath sounds. No stridor. No wheezing, rhonchi or rales.   Musculoskeletal:      Cervical back: Normal range of motion and neck supple.      Right lower leg: No edema.      Left lower leg: No edema.   Skin:     General: Skin is warm and dry.   Neurological:      General: No focal deficit present.      Mental Status: He is alert and oriented to person, place, and time. Mental status is at baseline.   Psychiatric:         Mood and Affect: Mood normal.         Behavior: Behavior normal.         Thought Content: Thought content normal.         Judgment: Judgment normal.                             Assessment & Plan        1. Essential hypertension  hydroCHLOROthiazide 25 MG Tab    lisinopril (PRINIVIL) 40 MG tablet        3-month refill to mail in pharmacy provided as a courtesy to patient.  He has taken the medication for years without issue and they are well-tolerated.  Today his blood pressure is appropriate and exam unremarkable.  He has no acute concerns today.      I personally reviewed prior external notes and test results pertinent to today's visit. Return to clinic or go to ED if symptoms worsen or persist. Red flag symptoms and indications for ED discussed at length. Patient/Parent/Guardian voices understanding.  AVS with post-visit instructions provided or given verbally.  Follow-up with your primary care provider in 3-5 days. All side effects and potential interactions of prescribed medication discussed including allergic response, GI upset, tendon injury, rash, sedation, OCP effectiveness, etc.    Please note that this dictation was created using voice recognition software. I have made every  reasonable attempt to correct obvious errors, but I expect that there are errors of grammar and possibly content that I did not discover before finalizing the note.                Adequate: hears normal conversation without difficulty

## 2024-03-20 ENCOUNTER — OFFICE VISIT (OUTPATIENT)
Dept: URGENT CARE | Facility: PHYSICIAN GROUP | Age: 59
End: 2024-03-20
Payer: COMMERCIAL

## 2024-03-20 VITALS
TEMPERATURE: 97.9 F | HEART RATE: 96 BPM | OXYGEN SATURATION: 98 % | BODY MASS INDEX: 32.1 KG/M2 | SYSTOLIC BLOOD PRESSURE: 130 MMHG | WEIGHT: 237 LBS | HEIGHT: 72 IN | RESPIRATION RATE: 14 BRPM | DIASTOLIC BLOOD PRESSURE: 74 MMHG

## 2024-03-20 DIAGNOSIS — S16.1XXA STRAIN OF NECK MUSCLE, INITIAL ENCOUNTER: ICD-10-CM

## 2024-03-20 PROCEDURE — 99213 OFFICE O/P EST LOW 20 MIN: CPT

## 2024-03-20 PROCEDURE — 3075F SYST BP GE 130 - 139MM HG: CPT

## 2024-03-20 PROCEDURE — 3078F DIAST BP <80 MM HG: CPT

## 2024-03-20 RX ORDER — CYCLOBENZAPRINE HCL 5 MG
5-10 TABLET ORAL 3 TIMES DAILY PRN
Qty: 20 TABLET | Refills: 0 | Status: SHIPPED | OUTPATIENT
Start: 2024-03-20

## 2024-03-20 RX ORDER — KETOROLAC TROMETHAMINE 30 MG/ML
30 INJECTION, SOLUTION INTRAMUSCULAR; INTRAVENOUS ONCE
Status: COMPLETED | OUTPATIENT
Start: 2024-03-20 | End: 2024-03-20

## 2024-03-20 RX ADMIN — KETOROLAC TROMETHAMINE 30 MG: 30 INJECTION, SOLUTION INTRAMUSCULAR; INTRAVENOUS at 17:28

## 2024-03-20 ASSESSMENT — ENCOUNTER SYMPTOMS: NECK PAIN: 1

## 2024-03-20 NOTE — PROGRESS NOTES
Subjective:   Chuy Moody is a 58 y.o. male who presents for Shoulder Pain ((R) x 2 days, woke up with pain )      HPI: This is a 58-year-old male patient who presents today for right shoulder pain.  This is a new problem.  Patient denies any injury or trauma.  No history of shoulder injuries in the past.  He reports waking up yesterday morning with pain to his right shoulder and neck.  He reports pain with range of motion.  He states that his pain is 10\10.  He denies any numbness or tingling.  He has taken ibuprofen without any improvement in his symptoms.    Review of Systems   Musculoskeletal:  Positive for joint pain and neck pain.       Medications:    Current Outpatient Medications on File Prior to Visit   Medication Sig Dispense Refill    hydroCHLOROthiazide 25 MG Tab Take 1 Tablet by mouth every day. 90 Tablet 3    lisinopril (PRINIVIL) 40 MG tablet Take 1 Tablet by mouth every day. 90 Tablet 3     No current facility-administered medications on file prior to visit.        Allergies:   Patient has no known allergies.    Problem List:   Patient Active Problem List   Diagnosis    Essential hypertension    Obesity (BMI 30-39.9)    Dyslipidemia    Impaired fasting glucose        Surgical History:  No past surgical history on file.    Past Social Hx:   Social History     Tobacco Use    Smoking status: Never    Smokeless tobacco: Never   Substance Use Topics    Alcohol use: Yes    Drug use: No          Problem list, medications, and allergies reviewed by myself today in Epic.     Objective:     /74   Pulse 96   Temp 36.6 °C (97.9 °F) (Temporal)   Resp 14   Ht 1.829 m (6')   Wt 108 kg (237 lb)   SpO2 98%   BMI 32.14 kg/m²     Physical Exam  Vitals and nursing note reviewed.   Constitutional:       General: He is not in acute distress.     Appearance: Normal appearance. He is normal weight. He is not ill-appearing, toxic-appearing or diaphoretic.   HENT:      Head: Normocephalic and atraumatic.    Neck:        Comments: + TTP over right trapezius muscle  Cardiovascular:      Rate and Rhythm: Normal rate and regular rhythm.      Pulses: Normal pulses.      Heart sounds: Normal heart sounds. No murmur heard.     No friction rub. No gallop.   Pulmonary:      Effort: Pulmonary effort is normal. No respiratory distress.      Breath sounds: Normal breath sounds. No stridor. No wheezing, rhonchi or rales.   Chest:      Chest wall: No tenderness.   Musculoskeletal:      Right shoulder: Tenderness present. No swelling, deformity, effusion or laceration.      Cervical back: Neck supple. Tenderness present. Pain with movement and muscular tenderness present. No spinous process tenderness. Normal range of motion.      Comments: + Pain to right-sided neck elicited with raising right arm   Lymphadenopathy:      Cervical: No cervical adenopathy.   Skin:     General: Skin is warm and dry.      Capillary Refill: Capillary refill takes less than 2 seconds.   Neurological:      General: No focal deficit present.      Mental Status: He is alert and oriented to person, place, and time. Mental status is at baseline.      Gait: Gait normal.   Psychiatric:         Mood and Affect: Mood normal.         Behavior: Behavior normal.         Thought Content: Thought content normal.         Judgment: Judgment normal.         Assessment/Plan:     Diagnosis and associated orders:   1. Strain of neck muscle, initial encounter  ketorolac (Toradol) injection 30 mg    cyclobenzaprine (FLEXERIL) 5 mg tablet             Comments/MDM:   Pt is clinically stable at today's acute urgent care visit.  No acute distress noted. Appropriate for outpatient management at this time.     Acute problem.  Patient presents today for acute right shoulder pain.  He does have moderate amount of muscular tenderness on exam today.  Patient will be treated with Toradol 30 mg IM injection here in clinic and muscle relaxers.  Advised patient to use caution with muscle  relaxer secondary to sedative effects.  I have further advised patient to alternate Tylenol/ibuprofen and heat and ice application.  He is to return for any new or worsening signs or symptoms or symptoms that fail to improve.           Discussed DDx, management options (risks,benefits, and alternatives to planned treatment), natural progression and supportive care.  Expressed understanding and the treatment plan was agreed upon. Questions were encouraged and answered   Return to urgent care prn if new or worsening sx or if there is no improvement in condition prn.    Educated in Red flags and indications to immediately call 911 or present to the Emergency Department.   Advised the patient to follow-up with the primary care physician for recheck, reevaluation, and consideration of further management.    I personally reviewed prior external notes and test results pertinent to today's visit.  I have independently reviewed and interpreted all diagnostics ordered during this urgent care acute visit.         Please note that this dictation was created using voice recognition software. I have made a reasonable attempt to correct obvious errors, but I expect that there are errors of grammar and possibly content that I did not discover before finalizing the note.    This note was electronically signed by ASUNCION Barroso

## 2024-03-22 ENCOUNTER — OFFICE VISIT (OUTPATIENT)
Dept: URGENT CARE | Facility: PHYSICIAN GROUP | Age: 59
End: 2024-03-22
Payer: COMMERCIAL

## 2024-03-22 ENCOUNTER — APPOINTMENT (OUTPATIENT)
Dept: RADIOLOGY | Facility: IMAGING CENTER | Age: 59
End: 2024-03-22
Attending: NURSE PRACTITIONER
Payer: COMMERCIAL

## 2024-03-22 VITALS
HEIGHT: 72 IN | OXYGEN SATURATION: 97 % | SYSTOLIC BLOOD PRESSURE: 116 MMHG | RESPIRATION RATE: 16 BRPM | WEIGHT: 234 LBS | DIASTOLIC BLOOD PRESSURE: 68 MMHG | HEART RATE: 102 BPM | BODY MASS INDEX: 31.69 KG/M2 | TEMPERATURE: 97.9 F

## 2024-03-22 DIAGNOSIS — M25.511 ACUTE PAIN OF RIGHT SHOULDER: ICD-10-CM

## 2024-03-22 PROCEDURE — 99213 OFFICE O/P EST LOW 20 MIN: CPT | Performed by: NURSE PRACTITIONER

## 2024-03-22 PROCEDURE — 3074F SYST BP LT 130 MM HG: CPT | Performed by: NURSE PRACTITIONER

## 2024-03-22 PROCEDURE — 73030 X-RAY EXAM OF SHOULDER: CPT | Mod: TC,FY,RT | Performed by: NURSE PRACTITIONER

## 2024-03-22 PROCEDURE — 3078F DIAST BP <80 MM HG: CPT | Performed by: NURSE PRACTITIONER

## 2024-03-22 RX ORDER — KETOROLAC TROMETHAMINE 30 MG/ML
30 INJECTION, SOLUTION INTRAMUSCULAR; INTRAVENOUS ONCE
Status: COMPLETED | OUTPATIENT
Start: 2024-03-22 | End: 2024-03-22

## 2024-03-22 RX ORDER — TIZANIDINE 4 MG/1
4 TABLET ORAL EVERY 6 HOURS PRN
Qty: 20 TABLET | Refills: 0 | Status: SHIPPED | OUTPATIENT
Start: 2024-03-22 | End: 2024-03-27

## 2024-03-22 RX ORDER — PREDNISONE 20 MG/1
40 TABLET ORAL DAILY
Qty: 10 TABLET | Refills: 0 | Status: SHIPPED | OUTPATIENT
Start: 2024-03-22 | End: 2024-03-27

## 2024-03-22 RX ADMIN — KETOROLAC TROMETHAMINE 30 MG: 30 INJECTION, SOLUTION INTRAMUSCULAR; INTRAVENOUS at 14:12

## 2024-03-22 ASSESSMENT — ENCOUNTER SYMPTOMS
MYALGIAS: 1
FOCAL WEAKNESS: 1

## 2024-03-22 NOTE — PROGRESS NOTES
Subjective:     Chuy Moody is a 58 y.o. male who presents for Shoulder Pain ((R) X 2 DAYS.  Pt. Denies any known injury.  He was seen on 03-20-24 for same symptoms. )      Shoulder Pain  Associated symptoms include myalgias.     Pt presents for evaluation of a new problem. Chuy is a pleasant 58-year-old male who presents to urgent care today with complaints of severe right-sided shoulder pain that has been ongoing for the past 2 days.  His pain occurred upon awakening 2 mornings ago.  He was seen in urgent care at this time and diagnosed with a muscle strain.  He has been using Flexeril and Tylenol for relief of his discomfort.  He was given 30 mg Toradol injection in the clinic 2 days ago with mild relief.  He has been refraining from anti-inflammatories since this time.  His pain is radiating into his right clavicle and he notes severe discomfort with any movement of his right upper extremity.  He has been using ice and rest.  No known injury.    Review of Systems   Musculoskeletal:  Positive for joint pain and myalgias.   Neurological:  Positive for focal weakness.       PMH: No past medical history on file.  ALLERGIES: No Known Allergies  SURGHX: No past surgical history on file.  SOCHX:   Social History     Socioeconomic History    Marital status: Single   Tobacco Use    Smoking status: Never    Smokeless tobacco: Never   Substance and Sexual Activity    Alcohol use: Yes    Drug use: No     FH: No family history on file.      Objective:   /68   Pulse (!) 102   Temp 36.6 °C (97.9 °F) (Temporal)   Resp 16   Ht 1.829 m (6')   Wt 106 kg (234 lb)   SpO2 97%   BMI 31.74 kg/m²     Physical Exam  Vitals and nursing note reviewed.   Constitutional:       General: He is not in acute distress.     Appearance: Normal appearance. He is normal weight. He is not ill-appearing or toxic-appearing.   HENT:      Head: Normocephalic.      Right Ear: External ear normal.      Left Ear: External ear normal.       Nose: Nose normal.      Mouth/Throat:      Mouth: Mucous membranes are moist.   Eyes:      General:         Right eye: No discharge.         Left eye: No discharge.      Extraocular Movements: Extraocular movements intact.      Conjunctiva/sclera: Conjunctivae normal.      Pupils: Pupils are equal, round, and reactive to light.   Pulmonary:      Effort: Pulmonary effort is normal.      Breath sounds: Normal breath sounds.   Abdominal:      General: Abdomen is flat.   Musculoskeletal:      Right shoulder: Tenderness and bony tenderness present. No swelling or deformity. Decreased range of motion. Decreased strength.      Cervical back: Normal range of motion and neck supple. No rigidity.   Lymphadenopathy:      Cervical: No cervical adenopathy.   Skin:     General: Skin is warm and dry.   Neurological:      General: No focal deficit present.      Mental Status: He is alert and oriented to person, place, and time. Mental status is at baseline.   Psychiatric:         Mood and Affect: Mood normal.         Behavior: Behavior normal.         Judgment: Judgment normal.       DX-SHOULDER 2+ RIGHT    Result Date: 3/22/2024  3/22/2024 4:17 PM HISTORY/REASON FOR EXAM:  Atraumatic Pain/Swelling/Deformity. Severe shoulder pain TECHNIQUE/EXAM DESCRIPTION AND NUMBER OF VIEWS:  3 views of the RIGHT shoulder. COMPARISON: None FINDINGS: There is no fracture or dislocation. The visualized osseous structures are in anatomic alignment. The joint spaces are grossly preserved. Bone mineralization is age-appropriate.. 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     No acute osseous abnormality. Os acromiale likely incidentally noted.     Assessment/Plan:   Assessment      1. Acute pain of right shoulder  DX-SHOULDER 2+ RIGHT    predniSONE (DELTASONE) 20 MG Tab    tizanidine (ZANAFLEX) 4 MG Tab    Referral to Orthopedics    ketorolac (Toradol) injection 30 mg        Differential diagnoses discussed with patient.  X-ray results reviewed by myself and no  bony abnormalities seen.  Due to severe discomfort he was started on 40 mg of prednisone x 5 days in addition to Zanaflex.  Patient to discontinue Flexeril. Toradol injection given in clinic and pt tolerated this well. Pt was advised to refrain from additional NSAIDS for the next 48 hours following this injection. Acetaminophen may be used every 4 hours as needed for additional relief of pain. Pt educated not to exceed more than advised amount on bottle.   We did discuss opioid pain medication and at this time I would like patient to see if the anti-inflammatory is of benefit.  If this is not relieving pain I will consider ordering short course of opioid pain medication.  He is in agreement with this plan of care.

## 2024-03-24 DIAGNOSIS — M25.511 ACUTE PAIN OF RIGHT SHOULDER: ICD-10-CM

## 2024-03-26 DIAGNOSIS — M25.511 ACUTE PAIN OF RIGHT SHOULDER: ICD-10-CM

## 2024-03-28 DIAGNOSIS — M25.511 ACUTE PAIN OF RIGHT SHOULDER: ICD-10-CM

## 2024-03-30 DIAGNOSIS — M25.511 ACUTE PAIN OF RIGHT SHOULDER: ICD-10-CM

## 2024-04-01 DIAGNOSIS — M25.511 ACUTE PAIN OF RIGHT SHOULDER: ICD-10-CM

## 2024-05-20 ENCOUNTER — HOSPITAL ENCOUNTER (OUTPATIENT)
Dept: LAB | Facility: MEDICAL CENTER | Age: 59
End: 2024-05-20
Attending: INTERNAL MEDICINE
Payer: COMMERCIAL

## 2024-05-20 LAB
ALBUMIN SERPL BCP-MCNC: 3.9 G/DL (ref 3.2–4.9)
ALBUMIN/GLOB SERPL: 1.3 G/DL
ALP SERPL-CCNC: 95 U/L (ref 30–99)
ALT SERPL-CCNC: 6 U/L (ref 2–50)
ANION GAP SERPL CALC-SCNC: 12 MMOL/L (ref 7–16)
AST SERPL-CCNC: 14 U/L (ref 12–45)
BASOPHILS # BLD AUTO: 1.1 % (ref 0–1.8)
BASOPHILS # BLD: 0.05 K/UL (ref 0–0.12)
BILIRUB SERPL-MCNC: 0.2 MG/DL (ref 0.1–1.5)
BUN SERPL-MCNC: 10 MG/DL (ref 8–22)
CALCIUM ALBUM COR SERPL-MCNC: 9 MG/DL (ref 8.5–10.5)
CALCIUM SERPL-MCNC: 8.9 MG/DL (ref 8.5–10.5)
CHLORIDE SERPL-SCNC: 103 MMOL/L (ref 96–112)
CO2 SERPL-SCNC: 25 MMOL/L (ref 20–33)
CREAT SERPL-MCNC: 0.64 MG/DL (ref 0.5–1.4)
CRP SERPL HS-MCNC: 1.73 MG/DL (ref 0–0.75)
EOSINOPHIL # BLD AUTO: 0.31 K/UL (ref 0–0.51)
EOSINOPHIL NFR BLD: 6.7 % (ref 0–6.9)
ERYTHROCYTE [DISTWIDTH] IN BLOOD BY AUTOMATED COUNT: 41.1 FL (ref 35.9–50)
ERYTHROCYTE [SEDIMENTATION RATE] IN BLOOD BY WESTERGREN METHOD: 61 MM/HOUR (ref 0–20)
GFR SERPLBLD CREATININE-BSD FMLA CKD-EPI: 108 ML/MIN/1.73 M 2
GLOBULIN SER CALC-MCNC: 2.9 G/DL (ref 1.9–3.5)
GLUCOSE SERPL-MCNC: 123 MG/DL (ref 65–99)
HCT VFR BLD AUTO: 32 % (ref 42–52)
HGB BLD-MCNC: 10.4 G/DL (ref 14–18)
IMM GRANULOCYTES # BLD AUTO: 0.01 K/UL (ref 0–0.11)
IMM GRANULOCYTES NFR BLD AUTO: 0.2 % (ref 0–0.9)
LYMPHOCYTES # BLD AUTO: 1.29 K/UL (ref 1–4.8)
LYMPHOCYTES NFR BLD: 27.7 % (ref 22–41)
MCH RBC QN AUTO: 27.9 PG (ref 27–33)
MCHC RBC AUTO-ENTMCNC: 32.5 G/DL (ref 32.3–36.5)
MCV RBC AUTO: 85.8 FL (ref 81.4–97.8)
MONOCYTES # BLD AUTO: 0.55 K/UL (ref 0–0.85)
MONOCYTES NFR BLD AUTO: 11.8 % (ref 0–13.4)
NEUTROPHILS # BLD AUTO: 2.45 K/UL (ref 1.82–7.42)
NEUTROPHILS NFR BLD: 52.5 % (ref 44–72)
NRBC # BLD AUTO: 0 K/UL
NRBC BLD-RTO: 0 /100 WBC (ref 0–0.2)
PLATELET # BLD AUTO: 372 K/UL (ref 164–446)
PMV BLD AUTO: 10 FL (ref 9–12.9)
POTASSIUM SERPL-SCNC: 3.9 MMOL/L (ref 3.6–5.5)
PROT SERPL-MCNC: 6.8 G/DL (ref 6–8.2)
RBC # BLD AUTO: 3.73 M/UL (ref 4.7–6.1)
SODIUM SERPL-SCNC: 140 MMOL/L (ref 135–145)
WBC # BLD AUTO: 4.7 K/UL (ref 4.8–10.8)

## 2024-05-24 ENCOUNTER — HOSPITAL ENCOUNTER (INPATIENT)
Facility: MEDICAL CENTER | Age: 59
LOS: 5 days | End: 2024-05-29
Attending: EMERGENCY MEDICINE | Admitting: HOSPITALIST
Payer: COMMERCIAL

## 2024-05-24 ENCOUNTER — OFFICE VISIT (OUTPATIENT)
Dept: URGENT CARE | Facility: PHYSICIAN GROUP | Age: 59
End: 2024-05-24
Payer: COMMERCIAL

## 2024-05-24 ENCOUNTER — APPOINTMENT (OUTPATIENT)
Dept: RADIOLOGY | Facility: MEDICAL CENTER | Age: 59
End: 2024-05-24
Attending: EMERGENCY MEDICINE
Payer: COMMERCIAL

## 2024-05-24 VITALS
HEART RATE: 76 BPM | SYSTOLIC BLOOD PRESSURE: 130 MMHG | WEIGHT: 249 LBS | HEIGHT: 72 IN | TEMPERATURE: 97.3 F | RESPIRATION RATE: 16 BRPM | BODY MASS INDEX: 33.72 KG/M2 | OXYGEN SATURATION: 97 % | DIASTOLIC BLOOD PRESSURE: 72 MMHG

## 2024-05-24 DIAGNOSIS — L97.401 DIABETIC ULCER OF HEEL ASSOCIATED WITH DIABETES MELLITUS DUE TO UNDERLYING CONDITION, LIMITED TO BREAKDOWN OF SKIN, UNSPECIFIED LATERALITY (HCC): ICD-10-CM

## 2024-05-24 DIAGNOSIS — E08.621 DIABETIC ULCER OF HEEL ASSOCIATED WITH DIABETES MELLITUS DUE TO UNDERLYING CONDITION, LIMITED TO BREAKDOWN OF SKIN, UNSPECIFIED LATERALITY (HCC): ICD-10-CM

## 2024-05-24 DIAGNOSIS — S21.101D OPEN WOUND OF RIGHT CHEST WALL, SUBSEQUENT ENCOUNTER: ICD-10-CM

## 2024-05-24 DIAGNOSIS — I82.721 CHRONIC DEEP VEIN THROMBOSIS (DVT) OF BRACHIAL VEIN OF RIGHT UPPER EXTREMITY (HCC): ICD-10-CM

## 2024-05-24 DIAGNOSIS — E78.5 DYSLIPIDEMIA: ICD-10-CM

## 2024-05-24 DIAGNOSIS — Z86.19 HISTORY OF SEPSIS: ICD-10-CM
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
## 2024-05-24 DIAGNOSIS — Z87.39 HISTORY OF OSTEOMYELITIS: ICD-10-CM

## 2024-05-24 DIAGNOSIS — M00.9: ICD-10-CM
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
## 2024-05-24 DIAGNOSIS — L89.891 PRESSURE INJURY OF DORSUM OF RIGHT FOOT, STAGE 1: ICD-10-CM

## 2024-05-24 DIAGNOSIS — I10 ESSENTIAL HYPERTENSION: ICD-10-CM

## 2024-05-24 DIAGNOSIS — E08.00 DIABETES MELLITUS DUE TO UNDERLYING CONDITION WITH HYPEROSMOLARITY WITHOUT COMA, WITHOUT LONG-TERM CURRENT USE OF INSULIN (HCC): ICD-10-CM

## 2024-05-24 DIAGNOSIS — R73.01 IMPAIRED FASTING GLUCOSE: ICD-10-CM

## 2024-05-24 DIAGNOSIS — M86.111: ICD-10-CM

## 2024-05-24 PROBLEM — R09.02 HYPOXIA: Status: ACTIVE | Noted: 2024-05-24

## 2024-05-24 PROBLEM — L97.501 DIABETIC ULCER OF FOOT, LIMITED TO BREAKDOWN OF SKIN (HCC): Status: ACTIVE | Noted: 2024-05-24

## 2024-05-24 PROBLEM — L03.90 CELLULITIS: Status: ACTIVE | Noted: 2024-05-24

## 2024-05-24 PROBLEM — E11.621 DIABETIC ULCER OF FOOT, LIMITED TO BREAKDOWN OF SKIN (HCC): Status: ACTIVE | Noted: 2024-05-24

## 2024-05-24 PROBLEM — D72.819 LEUKOPENIA: Status: ACTIVE | Noted: 2024-05-24

## 2024-05-24 PROBLEM — E11.9 DM (DIABETES MELLITUS) (HCC): Status: ACTIVE | Noted: 2024-05-24

## 2024-05-24 LAB
ALBUMIN SERPL BCP-MCNC: 4 G/DL (ref 3.2–4.9)
ALBUMIN/GLOB SERPL: 1.4 G/DL
ALP SERPL-CCNC: 91 U/L (ref 30–99)
ALT SERPL-CCNC: 7 U/L (ref 2–50)
ANION GAP SERPL CALC-SCNC: 14 MMOL/L (ref 7–16)
APTT PPP: 30.9 SEC (ref 24.7–36)
AST SERPL-CCNC: 19 U/L (ref 12–45)
BASOPHILS # BLD AUTO: 0.9 % (ref 0–1.8)
BASOPHILS # BLD: 0.04 K/UL (ref 0–0.12)
BILIRUB SERPL-MCNC: 0.2 MG/DL (ref 0.1–1.5)
BUN SERPL-MCNC: 10 MG/DL (ref 8–22)
CALCIUM ALBUM COR SERPL-MCNC: 9.3 MG/DL (ref 8.5–10.5)
CALCIUM SERPL-MCNC: 9.3 MG/DL (ref 8.5–10.5)
CHLORIDE SERPL-SCNC: 103 MMOL/L (ref 96–112)
CO2 SERPL-SCNC: 23 MMOL/L (ref 20–33)
CREAT SERPL-MCNC: 0.53 MG/DL (ref 0.5–1.4)
CRP SERPL HS-MCNC: 0.58 MG/DL (ref 0–0.75)
EOSINOPHIL # BLD AUTO: 0.28 K/UL (ref 0–0.51)
EOSINOPHIL NFR BLD: 6 % (ref 0–6.9)
ERYTHROCYTE [DISTWIDTH] IN BLOOD BY AUTOMATED COUNT: 41.1 FL (ref 35.9–50)
ERYTHROCYTE [SEDIMENTATION RATE] IN BLOOD BY WESTERGREN METHOD: 40 MM/HOUR (ref 0–20)
GFR SERPLBLD CREATININE-BSD FMLA CKD-EPI: 114 ML/MIN/1.73 M 2
GLOBULIN SER CALC-MCNC: 2.8 G/DL (ref 1.9–3.5)
GLUCOSE BLD STRIP.AUTO-MCNC: 123 MG/DL (ref 65–99)
GLUCOSE BLD STRIP.AUTO-MCNC: 93 MG/DL (ref 65–99)
GLUCOSE SERPL-MCNC: 107 MG/DL (ref 65–99)
GRAM STN SPEC: NORMAL
HCT VFR BLD AUTO: 33 % (ref 42–52)
HGB BLD-MCNC: 10.6 G/DL (ref 14–18)
IMM GRANULOCYTES # BLD AUTO: 0.01 K/UL (ref 0–0.11)
IMM GRANULOCYTES NFR BLD AUTO: 0.2 % (ref 0–0.9)
INR PPP: 1.13 (ref 0.87–1.13)
LACTATE SERPL-SCNC: 0.9 MMOL/L (ref 0.5–2)
LYMPHOCYTES # BLD AUTO: 1.5 K/UL (ref 1–4.8)
LYMPHOCYTES NFR BLD: 32.4 % (ref 22–41)
MCH RBC QN AUTO: 27.2 PG (ref 27–33)
MCHC RBC AUTO-ENTMCNC: 32.1 G/DL (ref 32.3–36.5)
MCV RBC AUTO: 84.6 FL (ref 81.4–97.8)
MONOCYTES # BLD AUTO: 0.57 K/UL (ref 0–0.85)
MONOCYTES NFR BLD AUTO: 12.3 % (ref 0–13.4)
NEUTROPHILS # BLD AUTO: 2.23 K/UL (ref 1.82–7.42)
NEUTROPHILS NFR BLD: 48.2 % (ref 44–72)
NRBC # BLD AUTO: 0 K/UL
NRBC BLD-RTO: 0 /100 WBC (ref 0–0.2)
PLATELET # BLD AUTO: 394 K/UL (ref 164–446)
PMV BLD AUTO: 9.1 FL (ref 9–12.9)
POTASSIUM SERPL-SCNC: 3.7 MMOL/L (ref 3.6–5.5)
PROCALCITONIN SERPL-MCNC: <0.05 NG/ML
PROT SERPL-MCNC: 6.8 G/DL (ref 6–8.2)
PROTHROMBIN TIME: 14.6 SEC (ref 12–14.6)
RBC # BLD AUTO: 3.9 M/UL (ref 4.7–6.1)
SCCMEC + MECA PNL NOSE NAA+PROBE: NEGATIVE
SIGNIFICANT IND 70042: NORMAL
SITE SITE: NORMAL
SODIUM SERPL-SCNC: 140 MMOL/L (ref 135–145)
SOURCE SOURCE: NORMAL
WBC # BLD AUTO: 4.6 K/UL (ref 4.8–10.8)

## 2024-05-24 PROCEDURE — A9270 NON-COVERED ITEM OR SERVICE: HCPCS | Performed by: HOSPITALIST

## 2024-05-24 PROCEDURE — 99222 1ST HOSP IP/OBS MODERATE 55: CPT | Performed by: HOSPITALIST

## 2024-05-24 PROCEDURE — 80053 COMPREHEN METABOLIC PANEL: CPT

## 2024-05-24 PROCEDURE — 36415 COLL VENOUS BLD VENIPUNCTURE: CPT

## 2024-05-24 PROCEDURE — 84145 PROCALCITONIN (PCT): CPT

## 2024-05-24 PROCEDURE — 85730 THROMBOPLASTIN TIME PARTIAL: CPT

## 2024-05-24 PROCEDURE — 87077 CULTURE AEROBIC IDENTIFY: CPT

## 2024-05-24 PROCEDURE — 85025 COMPLETE CBC W/AUTO DIFF WBC: CPT

## 2024-05-24 PROCEDURE — 82962 GLUCOSE BLOOD TEST: CPT

## 2024-05-24 PROCEDURE — 85652 RBC SED RATE AUTOMATED: CPT

## 2024-05-24 PROCEDURE — 99215 OFFICE O/P EST HI 40 MIN: CPT

## 2024-05-24 PROCEDURE — 87205 SMEAR GRAM STAIN: CPT

## 2024-05-24 PROCEDURE — 83605 ASSAY OF LACTIC ACID: CPT

## 2024-05-24 PROCEDURE — 700111 HCHG RX REV CODE 636 W/ 250 OVERRIDE (IP): Performed by: EMERGENCY MEDICINE

## 2024-05-24 PROCEDURE — 700105 HCHG RX REV CODE 258: Performed by: HOSPITALIST

## 2024-05-24 PROCEDURE — 73000 X-RAY EXAM OF COLLAR BONE: CPT | Mod: RT

## 2024-05-24 PROCEDURE — 87040 BLOOD CULTURE FOR BACTERIA: CPT

## 2024-05-24 PROCEDURE — 770006 HCHG ROOM/CARE - MED/SURG/GYN SEMI*

## 2024-05-24 PROCEDURE — 700105 HCHG RX REV CODE 258: Performed by: EMERGENCY MEDICINE

## 2024-05-24 PROCEDURE — 3075F SYST BP GE 130 - 139MM HG: CPT

## 2024-05-24 PROCEDURE — 70450 CT HEAD/BRAIN W/O DYE: CPT

## 2024-05-24 PROCEDURE — 99285 EMERGENCY DEPT VISIT HI MDM: CPT

## 2024-05-24 PROCEDURE — 3078F DIAST BP <80 MM HG: CPT

## 2024-05-24 PROCEDURE — 96365 THER/PROPH/DIAG IV INF INIT: CPT

## 2024-05-24 PROCEDURE — 86140 C-REACTIVE PROTEIN: CPT

## 2024-05-24 PROCEDURE — 87070 CULTURE OTHR SPECIMN AEROBIC: CPT

## 2024-05-24 PROCEDURE — 73630 X-RAY EXAM OF FOOT: CPT | Mod: RT

## 2024-05-24 PROCEDURE — 700102 HCHG RX REV CODE 250 W/ 637 OVERRIDE(OP): Performed by: HOSPITALIST

## 2024-05-24 PROCEDURE — 700111 HCHG RX REV CODE 636 W/ 250 OVERRIDE (IP): Performed by: HOSPITALIST

## 2024-05-24 PROCEDURE — 85610 PROTHROMBIN TIME: CPT

## 2024-05-24 PROCEDURE — 87641 MR-STAPH DNA AMP PROBE: CPT

## 2024-05-24 PROCEDURE — 71045 X-RAY EXAM CHEST 1 VIEW: CPT

## 2024-05-24 RX ORDER — AMLODIPINE BESYLATE 5 MG/1
5 TABLET ORAL 2 TIMES DAILY
COMMUNITY
Start: 2024-05-16

## 2024-05-24 RX ORDER — POTASSIUM CHLORIDE 750 MG/1
10 TABLET, FILM COATED, EXTENDED RELEASE ORAL DAILY
COMMUNITY
Start: 2024-05-16

## 2024-05-24 RX ORDER — POLYETHYLENE GLYCOL 3350 17 G/17G
1 POWDER, FOR SOLUTION ORAL
Status: DISCONTINUED | OUTPATIENT
Start: 2024-05-24 | End: 2024-05-29 | Stop reason: HOSPADM

## 2024-05-24 RX ORDER — DEXTROSE MONOHYDRATE 25 G/50ML
25 INJECTION, SOLUTION INTRAVENOUS
Status: DISCONTINUED | OUTPATIENT
Start: 2024-05-24 | End: 2024-05-29 | Stop reason: HOSPADM

## 2024-05-24 RX ORDER — OXYCODONE HYDROCHLORIDE AND ACETAMINOPHEN 5; 325 MG/1; MG/1
.5-1 TABLET ORAL EVERY 4 HOURS PRN
COMMUNITY

## 2024-05-24 RX ORDER — APIXABAN 5 MG/1
5 TABLET, FILM COATED ORAL 2 TIMES DAILY
COMMUNITY
Start: 2024-05-16

## 2024-05-24 RX ORDER — ACETAMINOPHEN 500 MG
1000 TABLET ORAL 2 TIMES DAILY PRN
COMMUNITY

## 2024-05-24 RX ORDER — LISINOPRIL 20 MG/1
40 TABLET ORAL DAILY
Status: DISCONTINUED | OUTPATIENT
Start: 2024-05-25 | End: 2024-05-29 | Stop reason: HOSPADM

## 2024-05-24 RX ORDER — CARVEDILOL 12.5 MG/1
12.5 TABLET ORAL 2 TIMES DAILY
Status: ON HOLD | COMMUNITY
Start: 2024-05-16 | End: 2024-05-29

## 2024-05-24 RX ORDER — AMOXICILLIN 250 MG
2 CAPSULE ORAL EVERY EVENING
Status: DISCONTINUED | OUTPATIENT
Start: 2024-05-24 | End: 2024-05-29 | Stop reason: HOSPADM

## 2024-05-24 RX ORDER — CARVEDILOL 12.5 MG/1
12.5 TABLET ORAL 2 TIMES DAILY WITH MEALS
Status: DISCONTINUED | OUTPATIENT
Start: 2024-05-24 | End: 2024-05-25

## 2024-05-24 RX ORDER — INSULIN ASPART 100 [IU]/ML
1-10 INJECTION, SOLUTION INTRAVENOUS; SUBCUTANEOUS
COMMUNITY
Start: 2024-05-17

## 2024-05-24 RX ORDER — FUROSEMIDE 40 MG/1
40 TABLET ORAL DAILY
COMMUNITY
Start: 2024-05-16

## 2024-05-24 RX ORDER — INSULIN GLARGINE 100 [IU]/ML
25 INJECTION, SOLUTION SUBCUTANEOUS EVERY EVENING
COMMUNITY
Start: 2024-05-16

## 2024-05-24 RX ORDER — AMLODIPINE BESYLATE 5 MG/1
5 TABLET ORAL 2 TIMES DAILY
Status: DISCONTINUED | OUTPATIENT
Start: 2024-05-24 | End: 2024-05-29 | Stop reason: HOSPADM

## 2024-05-24 RX ADMIN — CARVEDILOL 12.5 MG: 12.5 TABLET, FILM COATED ORAL at 20:49

## 2024-05-24 RX ADMIN — AMLODIPINE BESYLATE 5 MG: 5 TABLET ORAL at 20:49

## 2024-05-24 RX ADMIN — CEFAZOLIN 2 G: 2 INJECTION, POWDER, FOR SOLUTION INTRAMUSCULAR; INTRAVENOUS at 17:31

## 2024-05-24 RX ADMIN — APIXABAN 5 MG: 5 TABLET, FILM COATED ORAL at 20:49

## 2024-05-24 RX ADMIN — INSULIN GLARGINE-YFGN 8 UNITS: 100 INJECTION, SOLUTION SUBCUTANEOUS at 20:55

## 2024-05-24 RX ADMIN — VANCOMYCIN HYDROCHLORIDE 2750 MG: 5 INJECTION, POWDER, LYOPHILIZED, FOR SOLUTION INTRAVENOUS at 22:22

## 2024-05-24 ASSESSMENT — ENCOUNTER SYMPTOMS
NAUSEA: 0
DEPRESSION: 0
HEADACHES: 0
ABDOMINAL PAIN: 0
PALPITATIONS: 0
DIAPHORESIS: 0
RESPIRATORY NEGATIVE: 1
MYALGIAS: 0
PSYCHIATRIC NEGATIVE: 1
FALLS: 1
GASTROINTESTINAL NEGATIVE: 1
EYES NEGATIVE: 1
SHORTNESS OF BREATH: 0
COUGH: 0
WEAKNESS: 0
FEVER: 0
SORE THROAT: 0
WEIGHT LOSS: 0
CHILLS: 0
VOMITING: 0
NEUROLOGICAL NEGATIVE: 1
DIZZINESS: 0
CARDIOVASCULAR NEGATIVE: 1
BLURRED VISION: 0
FOCAL WEAKNESS: 0
BRUISES/BLEEDS EASILY: 0

## 2024-05-24 ASSESSMENT — PAIN DESCRIPTION - PAIN TYPE: TYPE: ACUTE PAIN

## 2024-05-24 ASSESSMENT — FIBROSIS 4 INDEX
FIB4 SCORE: 0.94
FIB4 SCORE: 0.94

## 2024-05-24 ASSESSMENT — LIFESTYLE VARIABLES: SUBSTANCE_ABUSE: 0

## 2024-05-24 NOTE — ED PROVIDER NOTES
ED Provider Note    CHIEF COMPLAINT  Chief Complaint   Patient presents with    Sent by MD     Pt was sent by [facility] today for further evaluation of leaking wound near clavicle. (Hx of sepsis 8wks ago)   Pt also noted wound to R foot. Hx diabetes       GLF     Today while walking out of  pt tripped on uneven floor. . R shoulder pain. -HS +thinners -LOC         EXTERNAL RECORDS REVIEWED  Outpatient Notes Family medicine office visit from earlier this morning    HPI/ROS  LIMITATION TO HISTORY   Select: : None  OUTSIDE HISTORIAN(S):  None    Chuy Moody is a 59 y.o. male who presents to the emergency department for evaluation of a possible infection and after a ground-level fall.  The patient has a significant past medical history of osteomyelitis of the clavicle and had surgery at [facility] about 6 to 8 weeks ago.  He has been on IV cefazolin and being followed by infectious disease since then.  He states that last night he woke up and noticed significant yellow drainage from the wound that has not fully healed.  This is a new change.  He thinks it is also more red around it as well.  He denies any new pain.  He initially presented to an urgent care and had a mechanical fall in the office.  He is on Eliquis for DVT to the right upper extremity during his last hospitalization.  He denies hitting his head and denies any other discomfort at this point.  He was sent here for further evaluation.  He denies fevers, nausea, vomiting, chest pain, shortness of breath, abdominal pain, nausea, vomiting, or diarrhea.  He has noticed a new wound to his right foot as well.    PAST MEDICAL HISTORY  Diabetes, hypertension, osteomyelitis    SURGICAL HISTORY  patient denies any surgical history    FAMILY HISTORY  History reviewed. No pertinent family history.    SOCIAL HISTORY  Social History     Tobacco Use    Smoking status: Never    Smokeless tobacco: Never   Substance and Sexual Activity    Alcohol use: Yes     Drug use: No    Sexual activity: Not on file       CURRENT MEDICATIONS  Home Medications       Reviewed by Justina Ramirez (Pharmacy Tech) on 05/24/24 at 1753  Med List Status: Complete     Medication Last Dose Status   acetaminophen (TYLENOL) 500 MG Tab 5/23/2024 Active   amLODIPine (NORVASC) 5 MG Tab 5/24/2024 Active   carvedilol (COREG) 12.5 MG Tab 5/24/2024 Active   ceFAZolin (ANCEF) 1 g/10mL 5/24/2024 Active   ELIQUIS 5 MG Tab 5/24/2024 Active   furosemide (LASIX) 40 MG Tab 5/24/2024 Active   hydroCHLOROthiazide 25 MG Tab > 1 MONTH AGO Active   LANTUS SOLOSTAR 100 UNIT/ML Solution Pen-injector injection 5/23/2024 Active   lisinopril (PRINIVIL) 40 MG tablet 5/24/2024 Active   NOVOLOG FLEXPEN 100 UNIT/ML solution for injection 5/23/2024 Active   oxyCODONE-acetaminophen (PERCOCET) 5-325 MG Tab 5/22/2024 Active   potassium chloride ER (KLOR-CON) 10 MEQ tablet 5/24/2024 Active                  Audit from Redirected Encounters    **Home medications have not yet been reviewed for this encounter**         ALLERGIES  No Known Allergies    PHYSICAL EXAM  VITAL SIGNS: BP (!) 165/86   Pulse 76   Temp 36.1 °C (97 °F) (Temporal)   Resp 16   Ht 1.829 m (6')   Wt 113 kg (249 lb 1.9 oz)   SpO2 94%   BMI 33.79 kg/m²   Constitutional: Alert and in no apparent distress.  HENT: Normocephalic atraumatic. Bilateral external ears normal. Nose normal. Mucous membranes are moist.  Eyes: Pupils are equal and reactive. Conjunctiva normal. Non-icteric sclera.   Neck: Normal range of motion without tenderness. Supple.  Cardiovascular: Regular rate and rhythm. No murmurs, gallops or rubs.  Thorax & Lungs: No increased work of breathing. Breath sounds are clear to auscultation bilaterally. No wheezing, rhonchi or rales.  Abdomen: Soft, nontender and nondistended. No hepatosplenomegaly.  Skin: Warm and dry. No rashes are noted.  There is erythema with a central wound with yellow discharge over the right clavicle.  No obvious  fluctuance or crepitus noted.  There is a superficial abrasion to the left elbow.  There appears to be a developing ulcer on the right lateral foot.  Back: No bony tenderness, No CVA tenderness.   Extremities: 2+ peripheral pulses. Cap refill is less than 2 seconds. No edema, cyanosis, or clubbing.  PICC line is present in left upper extremity.  Musculoskeletal: Good range of motion in all major joints. No tenderness to palpation or major deformities noted.   Neurologic: Alert and appropriate for age. The patient moves all 4 extremities without obvious deficits.    LABS  Results for orders placed or performed during the hospital encounter of 05/24/24   Lactic Acid   Result Value Ref Range    Lactic Acid 0.9 0.5 - 2.0 mmol/L   CBC with Differential   Result Value Ref Range    WBC 4.6 (L) 4.8 - 10.8 K/uL    RBC 3.90 (L) 4.70 - 6.10 M/uL    Hemoglobin 10.6 (L) 14.0 - 18.0 g/dL    Hematocrit 33.0 (L) 42.0 - 52.0 %    MCV 84.6 81.4 - 97.8 fL    MCH 27.2 27.0 - 33.0 pg    MCHC 32.1 (L) 32.3 - 36.5 g/dL    RDW 41.1 35.9 - 50.0 fL    Platelet Count 394 164 - 446 K/uL    MPV 9.1 9.0 - 12.9 fL    Neutrophils-Polys 48.20 44.00 - 72.00 %    Lymphocytes 32.40 22.00 - 41.00 %    Monocytes 12.30 0.00 - 13.40 %    Eosinophils 6.00 0.00 - 6.90 %    Basophils 0.90 0.00 - 1.80 %    Immature Granulocytes 0.20 0.00 - 0.90 %    Nucleated RBC 0.00 0.00 - 0.20 /100 WBC    Neutrophils (Absolute) 2.23 1.82 - 7.42 K/uL    Lymphs (Absolute) 1.50 1.00 - 4.80 K/uL    Monos (Absolute) 0.57 0.00 - 0.85 K/uL    Eos (Absolute) 0.28 0.00 - 0.51 K/uL    Baso (Absolute) 0.04 0.00 - 0.12 K/uL    Immature Granulocytes (abs) 0.01 0.00 - 0.11 K/uL    NRBC (Absolute) 0.00 K/uL   Complete Metabolic Panel   Result Value Ref Range    Sodium 140 135 - 145 mmol/L    Potassium 3.7 3.6 - 5.5 mmol/L    Chloride 103 96 - 112 mmol/L    Co2 23 20 - 33 mmol/L    Anion Gap 14.0 7.0 - 16.0    Glucose 107 (H) 65 - 99 mg/dL    Bun 10 8 - 22 mg/dL    Creatinine 0.53 0.50 -  1.40 mg/dL    Calcium 9.3 8.5 - 10.5 mg/dL    Correct Calcium 9.3 8.5 - 10.5 mg/dL    AST(SGOT) 19 12 - 45 U/L    ALT(SGPT) 7 2 - 50 U/L    Alkaline Phosphatase 91 30 - 99 U/L    Total Bilirubin 0.2 0.1 - 1.5 mg/dL    Albumin 4.0 3.2 - 4.9 g/dL    Total Protein 6.8 6.0 - 8.2 g/dL    Globulin 2.8 1.9 - 3.5 g/dL    A-G Ratio 1.4 g/dL   Procalcitonin   Result Value Ref Range    Procalcitonin <0.05 <0.25 ng/mL   Sed Rate   Result Value Ref Range    Sed Rate Westergren 40 (H) 0 - 20 mm/hour   C Reactive Protein Quantitative (Non-Cardiac)   Result Value Ref Range    Stat C-Reactive Protein 0.58 0.00 - 0.75 mg/dL   APTT   Result Value Ref Range    APTT 30.9 24.7 - 36.0 sec   Prothrombin Time   Result Value Ref Range    PT 14.6 12.0 - 14.6 sec    INR 1.13 0.87 - 1.13   ESTIMATED GFR   Result Value Ref Range    GFR (CKD-EPI) 114 >60 mL/min/1.73 m 2   POCT glucose device results   Result Value Ref Range    POC Glucose, Blood 93 65 - 99 mg/dL     RADIOLOGY/PROCEDURES   I have independently interpreted the diagnostic imaging associated with this visit and am waiting the final reading from the radiologist.   My preliminary interpretation is as follows: No obvious fracture or dislocation noted.  No intracranial hemorrhage noted.    Radiologist interpretation:  DX-CLAVICLE RIGHT   Final Result      No fracture detected.      CT-HEAD W/O   Final Result      Head CT without contrast within normal limits. No evidence of acute cerebral infarction, hemorrhage or mass lesion.         DX-FOOT-COMPLETE 3+ RIGHT   Final Result      1.  No acute osseous abnormality. If symptoms persist, follow-up radiographs can be obtained in 7-10 days.   2.  Plantar calcaneal enthesophyte.      DX-CHEST-PORTABLE (1 VIEW)   Final Result      1. Right lung basilar opacity with moderate right pleural effusion, concerning for pneumonia or atelectasis.   2. Appropriate position of the left upper extremity PIC catheter.   3. Normal cardiomediastinal silhouette  size.        COURSE & MEDICAL DECISION MAKING    ASSESSMENT, COURSE AND PLAN  Care Narrative: This is a 59-year-old male presenting to the emergency department for evaluation of a possible infection and after a ground-level fall.  On initial evaluation, the patient did not appear to be in any acute distress.  Vital signs were normal and reassuring.  Physical exam was notable for a wound over the right clavicle.  It was open and draining yellow discharge.  He did have surrounding erythema but no fluctuance or crepitus were noted.  Given his history, workup including cultures was ordered.    Given his mechanical fall and history of being on Eliquis, CT of the head was ordered.  This did not reveal any evidence of intracranial hemorrhage.    Patient's white count was 4.6.  No neutrophil predominance was noted.  CRP, lactic acid and procalcitonin were normal and reassuring.  His sed rate was 40 which was improved from 61 4 days ago.  A wound culture was sent and pending.  Blood cultures were pending as well.    Plain films of the foot were obtained and no obvious osteo was noted.  Chest x-ray was concerning for an opacity in the right lower lobe but the patient denied any cough and has not had any fevers.  I am less concerned for pneumonia at this point.    Plain films of the clavicle were ordered and    The patient was given a dose of his cefazolin here in the ED.  The plan was made to admit to follow the cultures and consult infectious disease.    5:02 PM - I discussed the case with Dr Jorge [facility].  She agreed with the plan and accepted the patient.    ADDITIONAL PROBLEMS MANAGED  History of osteomyelitis, fall    DISPOSITION AND DISCUSSIONS  I have discussed management of the patient with the following physicians and MICHELLE's: iman Saucedaist    Discussion of management with other [facility] or appropriate source(s): None     FINAL IMPRESSION  1. History of osteomyelitis    2. Fall, initial encounter       -ADMIT-    Electronically signed by: Beti Bartlett D.O., 5/24/2024 1:52 PM

## 2024-05-24 NOTE — ED NOTES
Per PICC line, RN, if CXR read noting PICC in correct spot, she reccommends following central line protocol for blockage.  Trauma resource RN and rapid team RN contacted to help with cath flow and central line protocol. States she will be down soon.

## 2024-05-24 NOTE — ED NOTES
Patient to [facility] with on oxygen with newly placed PICC line.  Recently discharged for sepsis, osteomyelitis, new onset diabetes, and oxygen therapy. He has multiple wounds including an abrasion to his left elbow, right foot, and an erythematous draining wound on his chest.  He has not been established with wound care. He states he has not had diabetic instructions.  He is self administering abx, however PICC  is not drawing in ED and patient is worried about PICC line placement since fall this morning.  His LUE was recently diagnosed with a DVT.     PICC line RN contacted via voalte. States she will try to come down to ED to see patient.

## 2024-05-24 NOTE — ED NOTES
Second RN, Eladia ONEAL with rapid team, came to see patient in ED and assist with PICC line trouble shooting.   Line successfully drawing and flushing following clave replacement.

## 2024-05-24 NOTE — PROGRESS NOTES
Subjective:   Chuy Moody is a very pleasant 59 y.o. male who presents for:    Chief Complaint   Patient presents with    Wound Infection     X today on R collar bone and foot ulcer on R foot. Pt. Was in hospital for 8 weeks for Sepsis.  Pt. Had a fall while walking into his room as he tripped over his foot.        HPI:      The patient presents to the urgent care for evaluation of a possible wound infection.  Last night, the wound on the right clavicular area started seeping clear fluids.  2 months prior, he was hospitalized in the intensive care unit for osteomyelitis of the right clavicle and subsequent sepsis.  He was also diagnosed with new onset type 2 diabetes.  He has been undergoing IV antibiotic therapy over the past 7 weeks per orders from infectious disease. He has been seen regularly by home health nursing as well as the wound care nurse.  When he was evaluated this morning, the wound nurse noticed a new pressure ulcer on the right lateral aspect of the foot that was not there last week.  Denies pain in this area.  No fevers, chills, myalgias. He has been having LE edema, ongoing fatigue, right-sided foot erythema, decreased sensation to the feet.  He is on 2 L supplemental oxygen via nasal cannula since discharge from the hospital.  No reports of shortness of breath, pain with inspiration, recent symptoms of URI.    Additionally, the patient was ambulating in the hallway in the urgent care, when he tripped over his orthopedic shoe, and fell.  The point of impact was his left arm.  He reports that he may have hit his chin on the floor.  Superficial abrasions present on the left arm and knee.denies pain in any of the extermities. Denies ALOC. He is on blood thinning medications.     ROS:    ROS    Medications:      Current Outpatient Medications   Medication Sig    amLODIPine (NORVASC) 5 MG Tab     ELIQUIS 5 MG Tab     carvedilol (COREG) 12.5 MG Tab     furosemide (LASIX) 40 MG Tab     NOVOLOG  FLEXPEN 100 UNIT/ML solution for injection     LANTUS SOLOSTAR 100 UNIT/ML Solution Pen-injector injection     potassium chloride ER (KLOR-CON) 10 MEQ tablet     ceFAZolin (ANCEF) 1 g/10mL Infuse 1 g into a venous catheter every 8 hours.    hydroCHLOROthiazide 25 MG Tab Take 1 Tablet by mouth every day.    lisinopril (PRINIVIL) 40 MG tablet Take 1 Tablet by mouth every day.    cyclobenzaprine (FLEXERIL) 5 mg tablet Take 1-2 Tablets by mouth 3 times a day as needed for Muscle Spasms or Moderate Pain. (Patient not taking: Reported on 5/24/2024)       Allergies:     No Known Allergies    Problem List:     Patient Active Problem List   Diagnosis    Essential hypertension    Obesity (BMI 30-39.9)    Dyslipidemia    Impaired fasting glucose       Surgical History:    No past surgical history on file.    Past Social Hx:     Social History     Socioeconomic History    Marital status:    Tobacco Use    Smoking status: Never    Smokeless tobacco: Never   Substance and Sexual Activity    Alcohol use: Yes    Drug use: No        Past Family Hx:      No family history on file.    Problem list, medications, and allergies reviewed by myself today in Epic.     Objective:     Vitals:    05/24/24 1044   BP: 130/72   Pulse: 76   Resp: 16   Temp: 36.3 °C (97.3 °F)   SpO2: 97%       Physical Exam  Vitals reviewed.   Constitutional:       General: He is not in acute distress.     Appearance: Normal appearance. He is not ill-appearing, toxic-appearing or diaphoretic.   HENT:      Head: Normocephalic and atraumatic.      Right Ear: External ear normal.      Left Ear: External ear normal.      Nose: Nose normal.      Mouth/Throat:      Mouth: Mucous membranes are moist.      Pharynx: Oropharynx is clear.   Eyes:      Extraocular Movements: Extraocular movements intact.      Conjunctiva/sclera: Conjunctivae normal.      Pupils: Pupils are equal, round, and reactive to light.   Cardiovascular:      Rate and Rhythm: Normal rate and  regular rhythm.      Pulses: Normal pulses.      Heart sounds: Normal heart sounds.   Pulmonary:      Effort: Pulmonary effort is normal.      Breath sounds: Examination of the right-lower field reveals decreased breath sounds. Examination of the left-lower field reveals decreased breath sounds. Decreased breath sounds present.      Comments: Patient on 2 L oxygen via nasal cannula  Abdominal:      General: Abdomen is flat.      Palpations: Abdomen is soft.   Musculoskeletal:         General: Normal range of motion.      Cervical back: Normal range of motion and neck supple. No rigidity or tenderness.      Right lower le+ Edema present.      Left lower le+ Edema present.   Lymphadenopathy:      Cervical: No cervical adenopathy.   Skin:     General: Skin is warm and dry.             Comments: Right chest wall: 3 cm open wound with purulent discharge present on the right upper aspect of the chest.  Mild surrounding erythema.  No fluctuance or induration.     Right foot: Superficial, nonpenetrating wound present on the right plantar aspect of the foot distal to the fifth toe.  No drainage, surrounding erythema, tenderness to palpation.  N/V intact.    Superficial abrasions present on the lateral aspect of the left upper arm.     Neurological:      General: No focal deficit present.      Mental Status: He is alert and oriented to person, place, and time. Mental status is at baseline.      Cranial Nerves: Cranial nerves 2-12 are intact.      Sensory: Sensation is intact.      Coordination: Coordination is intact.      Gait: Gait is intact.      Deep Tendon Reflexes: Reflexes are normal and symmetric.   Psychiatric:         Mood and Affect: Mood normal.         Behavior: Behavior normal.         Thought Content: Thought content normal.         Judgment: Judgment normal.                 Assessment/Plan:     Diagnosis and associated orders:     1. Open wound of right chest wall, subsequent encounter    2. Pressure  injury of dorsum of right foot, stage 1    3. Ground-level fall    4. History of osteomyelitis    5. History of sepsis        Comments/MDM:     The patient presents to the urgent care for evaluation of a possible wound infection on the right side of the chest wall.  The patient reports that last night, he began experiencing copious amounts of drainage from the wound.  When he was seen by the wound care nurse this morning, she verbalized that there was purulent drainage present and that he had a new wound present on the lateral aspect of the right foot.  He is being treated by an infectious disease specialist with IV antibiotics.  The patient has a history of right clavicle osteomyelitis and sepsis, for which she was treated in the ICU setting approximately 2 months ago.   Additionally, the patient was ambulating to the patient room in the urgent care setting, when he sustained a fall after tripping over his orthopedic shoe.  He is on blood thinning medications.  He denies a head strike or ALOC, but endorses that he may have hit his chin on the carpet.  No acute neurological changes and the patient has minor abrasions on the arm and the due to the fall.  Due to to the severity of the right chest wall wound infection , ground-level fall while on blood thinning medications, and concern for worsening infection, the patient was ultimately referred to the emergency department for evaluation and treatment.  He declined EMS transport and would like to be taken to ED via private vehicle driven by a family member.  He is stable at the time of transfer.  Transfer center called and made aware of upcoming arrival.         All questions answered. Patient verbalized understanding and is in agreement with this plan of care.     If symptoms are worsening or not improving in 3-5 days, follow-up with PCP or return to . Differential diagnosis, natural history, and supportive care discussed. AVS handout given and reviewed with  patient. Patient educated on red flags and when to seek treatment back in ED or UC.     I personally reviewed prior external notes and test results pertinent to today's visit.  I have independently reviewed and interpreted all diagnostics ordered during this urgent care visit.     This dictation has been created using voice recognition software. The accuracy of the dictation is limited by the abilities of the software. I expect there may be some errors of grammar and possibly content. I made every attempt to manually correct the errors within my dictation. However, errors related to voice recognition software may still exist and should be interpreted within the appropriate context.    This note was electronically signed by SHANNAN Gonzalez

## 2024-05-24 NOTE — ED TRIAGE NOTES
Chief Complaint   Patient presents with    Sent by MD     Pt was sent by  today for further evaluation of leaking wound near clavicle. (Hx of sepsis 8wks ago)   Pt also noted wound to R foot. Hx diabetes       GLF     Today while walking out of  pt tripped on uneven floor. . R shoulder pain. -HS +thinners -LOC     Baseline 2L o2     BP (!) 146/77   Pulse 77   Temp 36.1 °C (97 °F) (Temporal)   Resp 16   Ht 1.829 m (6')   Wt 113 kg (249 lb 1.9 oz)   SpO2 94%   BMI 33.79 kg/m²     Denies fevers or chills

## 2024-05-24 NOTE — ED NOTES
Due to vascular access issues in both upper extremities (DVT RUE) and PICC line issues left upper extremity, lab called for draw to expedite patient care.

## 2024-05-25 ENCOUNTER — APPOINTMENT (OUTPATIENT)
Dept: RADIOLOGY | Facility: MEDICAL CENTER | Age: 59
End: 2024-05-25
Attending: HOSPITALIST
Payer: COMMERCIAL

## 2024-05-25 PROBLEM — J90 PLEURAL EFFUSION ON RIGHT: Status: ACTIVE | Noted: 2024-05-25

## 2024-05-25 PROBLEM — M00.9: Status: ACTIVE | Noted: 2024-05-24

## 2024-05-25 LAB
ALBUMIN SERPL BCP-MCNC: 3.5 G/DL (ref 3.2–4.9)
ALBUMIN/GLOB SERPL: 1.3 G/DL
ALP SERPL-CCNC: 78 U/L (ref 30–99)
ALT SERPL-CCNC: 7 U/L (ref 2–50)
ANION GAP SERPL CALC-SCNC: 12 MMOL/L (ref 7–16)
AST SERPL-CCNC: 13 U/L (ref 12–45)
BILIRUB SERPL-MCNC: 0.2 MG/DL (ref 0.1–1.5)
BUN SERPL-MCNC: 7 MG/DL (ref 8–22)
CALCIUM ALBUM COR SERPL-MCNC: 9 MG/DL (ref 8.5–10.5)
CALCIUM SERPL-MCNC: 8.6 MG/DL (ref 8.5–10.5)
CHLORIDE SERPL-SCNC: 105 MMOL/L (ref 96–112)
CO2 SERPL-SCNC: 24 MMOL/L (ref 20–33)
CREAT SERPL-MCNC: 0.46 MG/DL (ref 0.5–1.4)
ERYTHROCYTE [DISTWIDTH] IN BLOOD BY AUTOMATED COUNT: 42.8 FL (ref 35.9–50)
GFR SERPLBLD CREATININE-BSD FMLA CKD-EPI: 119 ML/MIN/1.73 M 2
GLOBULIN SER CALC-MCNC: 2.6 G/DL (ref 1.9–3.5)
GLUCOSE BLD STRIP.AUTO-MCNC: 140 MG/DL (ref 65–99)
GLUCOSE BLD STRIP.AUTO-MCNC: 149 MG/DL (ref 65–99)
GLUCOSE BLD STRIP.AUTO-MCNC: 174 MG/DL (ref 65–99)
GLUCOSE BLD STRIP.AUTO-MCNC: 99 MG/DL (ref 65–99)
GLUCOSE SERPL-MCNC: 101 MG/DL (ref 65–99)
HCT VFR BLD AUTO: 31.7 % (ref 42–52)
HGB BLD-MCNC: 9.8 G/DL (ref 14–18)
MCH RBC QN AUTO: 27.1 PG (ref 27–33)
MCHC RBC AUTO-ENTMCNC: 30.9 G/DL (ref 32.3–36.5)
MCV RBC AUTO: 87.6 FL (ref 81.4–97.8)
PLATELET # BLD AUTO: 387 K/UL (ref 164–446)
PMV BLD AUTO: 9.2 FL (ref 9–12.9)
POTASSIUM SERPL-SCNC: 3.4 MMOL/L (ref 3.6–5.5)
PROT SERPL-MCNC: 6.1 G/DL (ref 6–8.2)
RBC # BLD AUTO: 3.62 M/UL (ref 4.7–6.1)
SODIUM SERPL-SCNC: 141 MMOL/L (ref 135–145)
WBC # BLD AUTO: 4.1 K/UL (ref 4.8–10.8)

## 2024-05-25 PROCEDURE — 97602 WOUND(S) CARE NON-SELECTIVE: CPT

## 2024-05-25 PROCEDURE — 700117 HCHG RX CONTRAST REV CODE 255: Performed by: HOSPITALIST

## 2024-05-25 PROCEDURE — 700111 HCHG RX REV CODE 636 W/ 250 OVERRIDE (IP): Performed by: HOSPITALIST

## 2024-05-25 PROCEDURE — 99233 SBSQ HOSP IP/OBS HIGH 50: CPT | Performed by: HOSPITALIST

## 2024-05-25 PROCEDURE — 82962 GLUCOSE BLOOD TEST: CPT

## 2024-05-25 PROCEDURE — 700111 HCHG RX REV CODE 636 W/ 250 OVERRIDE (IP): Mod: JZ | Performed by: EMERGENCY MEDICINE

## 2024-05-25 PROCEDURE — A9270 NON-COVERED ITEM OR SERVICE: HCPCS | Performed by: HOSPITALIST

## 2024-05-25 PROCEDURE — 36415 COLL VENOUS BLD VENIPUNCTURE: CPT

## 2024-05-25 PROCEDURE — 93005 ELECTROCARDIOGRAM TRACING: CPT | Performed by: ORTHOPAEDIC SURGERY

## 2024-05-25 PROCEDURE — 700102 HCHG RX REV CODE 250 W/ 637 OVERRIDE(OP): Performed by: HOSPITALIST

## 2024-05-25 PROCEDURE — 770006 HCHG ROOM/CARE - MED/SURG/GYN SEMI*

## 2024-05-25 PROCEDURE — 71260 CT THORAX DX C+: CPT

## 2024-05-25 PROCEDURE — 700105 HCHG RX REV CODE 258: Performed by: HOSPITALIST

## 2024-05-25 PROCEDURE — 94760 N-INVAS EAR/PLS OXIMETRY 1: CPT

## 2024-05-25 PROCEDURE — 80053 COMPREHEN METABOLIC PANEL: CPT

## 2024-05-25 PROCEDURE — 94669 MECHANICAL CHEST WALL OSCILL: CPT

## 2024-05-25 PROCEDURE — 85027 COMPLETE CBC AUTOMATED: CPT

## 2024-05-25 RX ORDER — HYDRALAZINE HYDROCHLORIDE 20 MG/ML
20 INJECTION INTRAMUSCULAR; INTRAVENOUS EVERY 6 HOURS PRN
Status: DISCONTINUED | OUTPATIENT
Start: 2024-05-25 | End: 2024-05-29 | Stop reason: HOSPADM

## 2024-05-25 RX ORDER — CARVEDILOL 25 MG/1
25 TABLET ORAL 2 TIMES DAILY WITH MEALS
Status: DISCONTINUED | OUTPATIENT
Start: 2024-05-26 | End: 2024-05-29 | Stop reason: HOSPADM

## 2024-05-25 RX ADMIN — CEFAZOLIN 2 G: 2 INJECTION, POWDER, FOR SOLUTION INTRAMUSCULAR; INTRAVENOUS at 02:03

## 2024-05-25 RX ADMIN — AMLODIPINE BESYLATE 5 MG: 5 TABLET ORAL at 05:04

## 2024-05-25 RX ADMIN — ALTEPLASE 2 MG: 2.2 INJECTION, POWDER, LYOPHILIZED, FOR SOLUTION INTRAVENOUS at 02:33

## 2024-05-25 RX ADMIN — CEFAZOLIN 2 G: 2 INJECTION, POWDER, FOR SOLUTION INTRAMUSCULAR; INTRAVENOUS at 17:21

## 2024-05-25 RX ADMIN — INSULIN GLARGINE-YFGN 8 UNITS: 100 INJECTION, SOLUTION SUBCUTANEOUS at 17:38

## 2024-05-25 RX ADMIN — IOHEXOL 80 ML: 350 INJECTION, SOLUTION INTRAVENOUS at 15:45

## 2024-05-25 RX ADMIN — AMLODIPINE BESYLATE 5 MG: 5 TABLET ORAL at 17:15

## 2024-05-25 RX ADMIN — CARVEDILOL 12.5 MG: 12.5 TABLET, FILM COATED ORAL at 08:52

## 2024-05-25 RX ADMIN — LISINOPRIL 40 MG: 20 TABLET ORAL at 05:03

## 2024-05-25 RX ADMIN — INSULIN HUMAN 1 UNITS: 100 INJECTION, SOLUTION PARENTERAL at 20:48

## 2024-05-25 RX ADMIN — CARVEDILOL 12.5 MG: 12.5 TABLET, FILM COATED ORAL at 17:15

## 2024-05-25 RX ADMIN — VANCOMYCIN HYDROCHLORIDE 1500 MG: 5 INJECTION, POWDER, LYOPHILIZED, FOR SOLUTION INTRAVENOUS at 21:15

## 2024-05-25 RX ADMIN — APIXABAN 5 MG: 5 TABLET, FILM COATED ORAL at 05:04

## 2024-05-25 RX ADMIN — CEFAZOLIN 2 G: 2 INJECTION, POWDER, FOR SOLUTION INTRAMUSCULAR; INTRAVENOUS at 08:55

## 2024-05-25 RX ADMIN — VANCOMYCIN HYDROCHLORIDE 1500 MG: 5 INJECTION, POWDER, LYOPHILIZED, FOR SOLUTION INTRAVENOUS at 09:45

## 2024-05-25 ASSESSMENT — COGNITIVE AND FUNCTIONAL STATUS - GENERAL
MOVING TO AND FROM BED TO CHAIR: A LITTLE
SUGGESTED CMS G CODE MODIFIER DAILY ACTIVITY: CH
CLIMB 3 TO 5 STEPS WITH RAILING: A LITTLE
SUGGESTED CMS G CODE MODIFIER DAILY ACTIVITY: CH
STANDING UP FROM CHAIR USING ARMS: A LITTLE
DAILY ACTIVITIY SCORE: 24
SUGGESTED CMS G CODE MODIFIER MOBILITY: CJ
MOVING FROM LYING ON BACK TO SITTING ON SIDE OF FLAT BED: A LITTLE
MOVING TO AND FROM BED TO CHAIR: A LITTLE
SUGGESTED CMS G CODE MODIFIER MOBILITY: CJ
MOBILITY SCORE: 22
DAILY ACTIVITIY SCORE: 24
MOBILITY SCORE: 21

## 2024-05-25 ASSESSMENT — PATIENT HEALTH QUESTIONNAIRE - PHQ9
2. FEELING DOWN, DEPRESSED, IRRITABLE, OR HOPELESS: NOT AT ALL
SUM OF ALL RESPONSES TO PHQ9 QUESTIONS 1 AND 2: 0
1. LITTLE INTEREST OR PLEASURE IN DOING THINGS: NOT AT ALL

## 2024-05-25 ASSESSMENT — ENCOUNTER SYMPTOMS
HEMOPTYSIS: 0
COUGH: 0
WEAKNESS: 0
SORE THROAT: 0
BRUISES/BLEEDS EASILY: 0
DIAPHORESIS: 0
NECK PAIN: 0
NAUSEA: 0
CHILLS: 0
SENSORY CHANGE: 0
SPUTUM PRODUCTION: 0
EYE DISCHARGE: 0
SHORTNESS OF BREATH: 1
CLAUDICATION: 0
HEADACHES: 0
VOMITING: 0
FOCAL WEAKNESS: 0
WHEEZING: 0
ABDOMINAL PAIN: 0
PALPITATIONS: 0
BACK PAIN: 0
DEPRESSION: 0
FEVER: 0
MYALGIAS: 0
DIZZINESS: 0
DIARRHEA: 0
SPEECH CHANGE: 0
EYE PAIN: 0
CONSTIPATION: 0
LOSS OF CONSCIOUSNESS: 0

## 2024-05-25 ASSESSMENT — PAIN DESCRIPTION - PAIN TYPE
TYPE: ACUTE PAIN
TYPE: ACUTE PAIN

## 2024-05-25 ASSESSMENT — COPD QUESTIONNAIRES
COPD SCREENING SCORE: 3
DURING THE PAST 4 WEEKS HOW MUCH DID YOU FEEL SHORT OF BREATH: SOME OF THE TIME
HAVE YOU SMOKED AT LEAST 100 CIGARETTES IN YOUR ENTIRE LIFE: NO/DON'T KNOW
DO YOU EVER COUGH UP ANY MUCUS OR PHLEGM?: NO/ONLY WITH OCCASIONAL COLDS OR INFECTIONS

## 2024-05-25 ASSESSMENT — LIFESTYLE VARIABLES
SUBSTANCE_ABUSE: 0
EVER HAD A DRINK FIRST THING IN THE MORNING TO STEADY YOUR NERVES TO GET RID OF A HANGOVER: NO
CONSUMPTION TOTAL: INCOMPLETE
HAVE PEOPLE ANNOYED YOU BY CRITICIZING YOUR DRINKING: NO
EVER FELT BAD OR GUILTY ABOUT YOUR DRINKING: NO
HAVE YOU EVER FELT YOU SHOULD CUT DOWN ON YOUR DRINKING: NO
ALCOHOL_USE: YES
TOTAL SCORE: 0

## 2024-05-25 NOTE — PROGRESS NOTES
Assumed care of patient at bedside report from Emergency Department  RN. Updated on plan of care.   Patient currently A & O x 4; on 2 L nasal cannula. Able to turn self in bed; without complaints of acute pain. Assessment completed. Patient's last bowel movement 05/24 .   Call light within reach. Hourly rounding and fall precautions in place. Bed locked and in lowest position. All questions answered. No other needs indicated at this time.    Most recent vital signs  BP (!) 163/77 Comment: RN notified  Pulse 84   Temp 36.1 °C (96.9 °F) (Temporal)   Resp 18   Ht 1.829 m (6')   Wt 113 kg (249 lb 1.9 oz)   SpO2 95%   BMI 33.79 kg/m²

## 2024-05-25 NOTE — ED NOTES
Received report from AWILDA Mahan. Negrito attempted to call report x3 without success as floor RN was unavailable. Will try to call report again.  Upon shift change pt is sitting up in bed eating dinner tray provided, talking to friend at bedside. Pt has even and unlabored breaths at this time, no acute distress noted.

## 2024-05-25 NOTE — PROGRESS NOTES
4 Eyes Skin Assessment Completed by AWILDA Tejeda and AWILDA Orosco.    Head WDL  Ears WDL  Nose WDL  Mouth WDL  Neck WDL  Breast/Chest Right side chest wound  Shoulder Blades WDL  Spine WDL  (R) Arm/Elbow/Hand WDL  (L) Arm/Elbow/Hand Scab  Abdomen WDL  Groin WDL  Scrotum/Coccyx/Buttocks Redness and Blanching  (R) Leg Edema  (L) Leg Edema  (R) Heel/Foot/Toe Scab  (L) Heel/Foot/Toe WDL          Devices In Places SCD's and Nasal Cannula      Interventions In Place Gray Ear Foams, Pillows, Heels Loaded W/Pillows, and Pressure Redistribution Mattress    Possible Skin Injury No    Pictures Uploaded Into Epic Yes  Wound Consult Placed Yes  RN Wound Prevention Protocol Ordered No

## 2024-05-25 NOTE — ASSESSMENT & PLAN NOTE
No evidence of sirs or sepsis  B/c drawn  Picc in place and c/d  Culture of wound pending.  Martir HOPKINS consulted.  Continue ancef for now, since this occurred on abx will add vanc until further instructions from martir HOPKINS  5/25:  CT chest with contrast concerning for abscess and septic arthritis at right clavicular sternal joint. LNS noted and fluid collection below joint noted. Dr. Donato reviewed, needs OR I&D in a.m.  patient and friend aware of findings.    Will need OR cultures.  5/26:  ID consult appreciated with prior MSSA infection, now with corynebacterium striatum growing from wound, pending deep OR cultures and bone culture. Underwent OR I&D of sternoclavicular joint and clavicle bone this a.m  tolerated well.  Change from ancef and vancomycin IV to daptomycin IV daily x 6 weeks.    CPK weekly.

## 2024-05-25 NOTE — ED NOTES
Report called to AWILDA Tejeda. Pt being taken upstairs at this time by Transport Tech via [employer] on 2L O2. Pt is awake and alert, talking to staff, in no apparent distress at time of transfer. Pt's paperwork and belongings sent upstairs with pt, friend and Transport Tech.

## 2024-05-25 NOTE — H&P
Hospital Medicine History & Physical Note    Date of Service  5/24/2024    Primary Care Physician  Brisa Cho M.D.    Consultants  [facility]    Code Status  Full Code    Chief Complaint  Chief Complaint   Patient presents with    Sent by MD     Pt was sent by  today for further evaluation of leaking wound near clavicle. (Hx of sepsis 8wks ago)   Pt also noted wound to R foot. Hx diabetes       GLF     Today while walking out of  pt tripped on uneven floor. . R shoulder pain. -HS +thinners -LOC         History of Presenting Illness  Chuy Moody is a 59 y.o. male with history of DM and recent hospitalization for osteomyelitis of the right clavicle. He had been hospitalized at [facility] for several weeks during which time he underwent surgery of the infected clavicle. That hospital stay was complicated by a DVT of the upper extremity and he was therefore started  He was then transferred to an LTAC and was reportedly discharged to home with a picc line and iv cefazolin last week.  Several days ago, he noticed some discharge from the surgical site and went an urgent care for evaluation and they referred him to the ER. Therefore he presented here on 5/24/2024. He also suffered a mechanical ground level fall at the urgent care, tripping over carpet that was sticking up, he has a mild abrasion on his left elbow and both knees from this, he denies any acute pain, no loc, no head injury and a CT of the head was checked in the ER as he is on blood thinner and no acute findings. Blood cultures were drawn and I consulted Dr. Lawton of ID.     I discussed the plan of care with patient, family, nursing, ERP, call to Dr. aLwton but no response yet    Review of Systems  Review of Systems   Constitutional:  Positive for malaise/fatigue. Negative for chills, diaphoresis, fever and weight loss.   HENT: Negative.  Negative for sore throat.    Eyes: Negative.  Negative for blurred vision.   Respiratory: Negative.  Negative  for cough and shortness of breath.    Cardiovascular: Negative.  Negative for chest pain, palpitations and leg swelling.   Gastrointestinal: Negative.  Negative for abdominal pain, nausea and vomiting.   Genitourinary: Negative.  Negative for dysuria.   Musculoskeletal:  Positive for falls (mechanical fall today). Negative for myalgias.   Skin: Negative.  Negative for itching and rash.   Neurological: Negative.  Negative for dizziness, focal weakness, weakness and headaches.   Endo/Heme/Allergies: Negative.  Does not bruise/bleed easily.   Psychiatric/Behavioral: Negative.  Negative for depression, substance abuse and suicidal ideas.    All other systems reviewed and are negative.      Past Medical History   has no past medical history on file. DM, osteo of R clavicle. UE DVT, subacute hypoxic respiratory failure on 2L oxygen for several months    Surgical History   has no past surgical history on file. Clavicle surgery with DANIEL Carreon knee surgery     Family History  family history is not on file.   Family history reviewed with patient. There is no family history that is pertinent to the chief complaint.     Social History   reports that he has never smoked. He has never used smokeless tobacco. He reports current alcohol use. He reports that he does not use drugs.  Previous etoh use ~ 6 pack per day but quit 3 months ago  Allergies  No Known Allergies    Medications  Prior to Admission Medications   Prescriptions Last Dose Informant Patient Reported? Taking?   ELIQUIS 5 MG Tab   Yes No   LANTUS SOLOSTAR 100 UNIT/ML Solution Pen-injector injection   Yes No   NOVOLOG FLEXPEN 100 UNIT/ML solution for injection   Yes No   amLODIPine (NORVASC) 5 MG Tab   Yes No   carvedilol (COREG) 12.5 MG Tab   Yes No   ceFAZolin (ANCEF) 1 g/10mL   Yes No   Sig: Infuse 1 g into a venous catheter every 8 hours.   cyclobenzaprine (FLEXERIL) 5 mg tablet   No No   Sig: Take 1-2 Tablets by mouth 3 times a day as needed for Muscle Spasms  or Moderate Pain.   Patient not taking: Reported on 5/24/2024   furosemide (LASIX) 40 MG Tab   Yes No   hydroCHLOROthiazide 25 MG Tab   No No   Sig: Take 1 Tablet by mouth every day.   lisinopril (PRINIVIL) 40 MG tablet   No No   Sig: Take 1 Tablet by mouth every day.   potassium chloride ER (KLOR-CON) 10 MEQ tablet   Yes No      Facility-Administered Medications: None       Physical Exam  Temp:  [36.1 °C (97 °F)-36.3 °C (97.3 °F)] 36.1 °C (97 °F)  Pulse:  [74-77] 74  Resp:  [16-18] 18  BP: (130-165)/(65-86) 160/77  SpO2:  [94 %-97 %] 96 %  Blood Pressure: (!) 165/86   Temperature: 36.1 °C (97 °F)   Pulse: 76   Respiration: 16   Pulse Oximetry: 94 %       Physical Exam  Vitals and nursing note reviewed. Exam conducted with a chaperone present.   Constitutional:       General: He is not in acute distress.     Appearance: Normal appearance. He is not diaphoretic.   HENT:      Head: Normocephalic.      Nose: Nose normal.      Mouth/Throat:      Mouth: Mucous membranes are moist.   Eyes:      Pupils: Pupils are equal, round, and reactive to light.   Cardiovascular:      Rate and Rhythm: Normal rate and regular rhythm.      Pulses: Normal pulses.      Heart sounds: Normal heart sounds.   Pulmonary:      Effort: Pulmonary effort is normal.      Breath sounds: Normal breath sounds.   Abdominal:      General: Abdomen is flat. Bowel sounds are normal.      Palpations: Abdomen is soft.   Musculoskeletal:         General: No swelling or deformity. Normal range of motion.   Skin:     General: Skin is warm and dry.      Capillary Refill: Capillary refill takes less than 2 seconds.      Comments: Weeping wound over right clavicle with associated d/c and surrounding erythema  Small abrasions L elbow and bilateral knees, also ulcer on R foot - present on admission, appears to be stage 2   Neurological:      General: No focal deficit present.      Mental Status: He is alert and oriented to person, place, and time.      Cranial  "Nerves: No cranial nerve deficit.   Psychiatric:         Mood and Affect: Mood normal.         Behavior: Behavior normal.         Laboratory:  Recent Labs     05/24/24  1443   WBC 4.6*   RBC 3.90*   HEMOGLOBIN 10.6*   HEMATOCRIT 33.0*   MCV 84.6   MCH 27.2   MCHC 32.1*   RDW 41.1   PLATELETCT 394   MPV 9.1     Recent Labs     05/24/24  1443   SODIUM 140   POTASSIUM 3.7   CHLORIDE 103   CO2 23   GLUCOSE 107*   BUN 10   CREATININE 0.53   CALCIUM 9.3     Recent Labs     05/24/24  1443   ALTSGPT 7   ASTSGOT 19   ALKPHOSPHAT 91   TBILIRUBIN 0.2   GLUCOSE 107*     Recent Labs     05/24/24  1443   APTT 30.9   INR 1.13     No results for input(s): \"NTPROBNP\" in the last 72 hours.      No results for input(s): \"TROPONINT\" in the last 72 hours.    Imaging:  DX-CLAVICLE RIGHT   Final Result      No fracture detected.      CT-HEAD W/O   Final Result      Head CT without contrast within normal limits. No evidence of acute cerebral infarction, hemorrhage or mass lesion.         DX-FOOT-COMPLETE 3+ RIGHT   Final Result      1.  No acute osseous abnormality. If symptoms persist, follow-up radiographs can be obtained in 7-10 days.   2.  Plantar calcaneal enthesophyte.      DX-CHEST-PORTABLE (1 VIEW)   Final Result      1. Right lung basilar opacity with moderate right pleural effusion, concerning for pneumonia or atelectasis.   2. Appropriate position of the left upper extremity PIC catheter.   3. Normal cardiomediastinal silhouette size.          Assessment/Plan:  Justification for Admission Status  I anticipate this patient will require at least two midnights for appropriate medical management, necessitating inpatient admission because above    Patient will need a Med/Surg bed on EMERGENCY service .  The need is secondary to above.    * Cellulitis- (present on admission)  Assessment & Plan  Chest wall and clavicle  No evidence of sirs or sepsis  B/c drawn  Picc in place and c/d  Cultures drawn  Radha HOPKINS consulted, awaiting call " back  Continue ancef for now, since this occurred on abx will add vanc until further instructions from martir HOPKINS    Chronic deep vein thrombosis (DVT) of right upper extremity ([facility])  Assessment & Plan  Sub acute  Continue eliquis    Leukopenia  Assessment & Plan  Mild  following    Diabetic ulcer of foot, limited to breakdown of skin ([facility])  Assessment & Plan  R foot  Present on admission  Wound care  following    Hypoxia  Assessment & Plan  Subacute  Query jasmeet due to body habitus, sleep study recommended as outpatient   IS  RT  Following  Stable on recent baseline of 2L    DM (diabetes mellitus) ([facility])  Assessment & Plan  No dka  Diabetic diet  Continue long acting insulin and SSI  Hypoglycemic protocol  following        VTE prophylaxis: eliquis

## 2024-05-25 NOTE — PROGRESS NOTES
Pharmacy Vancomycin Kinetics Note for 5/25/2024     59 y.o. male on Vancomycin day # 1     Vancomycin Indication (AUC Dosing): Skin/skin structure infection      Provider specified end date: 05/27/24    Active Antibiotics (From admission, onward)      Ordered     Ordering Provider       Fri May 24, 2024  9:16 PM    05/24/24 2116  vancomycin (Vancocin) 1,500 mg in  mL IVPB  (vancomycin (VANCOCIN) IV (LD + Maintenance))  EVERY 12 HOURS         Mariajose Joyce M.D.       Fri May 24, 2024  7:39 PM    05/24/24 1939  ceFAZolin (Ancef) 2 g in  mL IVPB  EVERY 8 HOURS         Mariajose Joyce M.D.       Fri May 24, 2024  7:34 PM    05/24/24 1934  MD Alert...Vancomycin per Pharmacy  PHARMACY TO DOSE        Question:  Indication(s) for vancomycin?  Answer:  Skin and soft tissue infection    Mariajose Joyce M.D.            Dosing Weight: 113 kg (249 lb 1.9 oz)      Admission History: Admitted on 5/24/2024 for Cellulitis [L03.90]  Pertinent history: Pt recently hospitalized for osteomyelitis of the right clavicle and underwent surgery of infected clavicle. Pt was discharged to home with a picc line and iv cefazolin last week. Pt noticed discharge from surgical site several days ago, presented to urgent care that referred pt to ER.   Continuing pt's Ancef orders while here. Blood cultures were drawn and ID has been consulted. Adding Vanco until further instructions from Radha HOPKINS.    Allergies:     Patient has no known allergies.     Pertinent cultures to date:     Results       Procedure Component Value Units Date/Time    MRSA By PCR (Amp) [[id number]] Collected: 05/24/24 2115    Order Status: Completed Specimen: Respirate from Nares Updated: 05/24/24 2326     MRSA by PCR Negative    GRAM STAIN [[id number]] Collected: 05/24/24 1533    Order Status: Completed Specimen: Wound Updated: 05/24/24 2127     Significant Indicator .     Source WND     Site NECK     Gram Stain Result Rare WBCs.  No organisms seen.      CULTURE WOUND W/  GRAM STAIN [[id number]] Collected: 24 1533    Order Status: Sent Specimen: Wound from Chest Updated: 24     Significant Indicator NEG     Source WND     Site NECK     Culture Result -     Gram Stain Result Rare WBCs.  No organisms seen.      Blood Culture - Draw one from central line and one from peripheral site [[id number]] Collected: 24 1443    Order Status: Sent Specimen: Blood from Peripheral Updated: 24 1502    Blood Culture - Draw one from central line and one from peripheral site [[id number]] Collected: 24 1445    Order Status: Sent Specimen: Blood from Line Updated: 24 1457    Urinalysis [[id number]]     Order Status: Sent Specimen: Urine     Urine Culture (New) [[id number]]     Order Status: Sent Specimen: Urine             Labs:     Estimated Creatinine Clearance: 190 mL/min (by C-G formula based on SCr of 0.53 mg/dL).  Recent Labs     24  1443   WBC 4.6*   NEUTSPOLYS 48.20     Recent Labs     24  1443   BUN 10   CREATININE 0.53   ALBUMIN 4.0     No intake or output data in the 24 hours ending 24 0533   /68   Pulse 62   Temp 36.1 °C (96.9 °F) (Temporal)   Resp 18   Ht 1.829 m (6')   Wt 113 kg (249 lb 1.9 oz)   SpO2 92%  Temp (24hrs), Av.1 °C (96.9 °F), Min:36.1 °C (96.9 °F), Max:36.1 °C (97 °F)      List concerns for Vancomycin clearance:     Obesity- will dose every 12 hours    Pharmacokinetics:     AUC kinetics:   Ke (hr ^-1): 0.1505 hr^-1  Half life: 4.61 hr  Clearance: 5.968  Estimated TDD: 2984  Estimated Dose: 821  Estimated interval: 6.6        A/P:     -  Vancomycin dose: 1500 mg every 12 hours x 3 days.     -  Next vancomycin level(s):    None ordered at this time. Likely obtain levels after the 4th maintenance dose unless clinical status or renal function changes.      -  Predicted vancomycin AUC from initial AUC test calculator: 503 mg·hr/L    -    -  Comments: Pharmacy will continue to monitor pt labs and cultures.  Recommendation from ID pending.     Zainab Mcnair, PharmD

## 2024-05-25 NOTE — CARE PLAN
The patient is Stable - Low risk of patient condition declining or worsening    Shift Goals  Clinical Goals: patient safety  Patient Goals: go home      Problem: Knowledge Deficit - Standard  Goal: Patient and family/care givers will demonstrate understanding of plan of care, disease process/condition, diagnostic tests and medications  Outcome: Progressing  Note: Patient will demonstrate understanding of medication prior to administration.     Problem: Fall Risk  Goal: Patient will remain free from falls  Outcome: Progressing  Note: Patient will use call light prior to exiting bed.

## 2024-05-25 NOTE — ED NOTES
Med rec completed per patient at bedside, medication dispense history per [facility] in [place] ([phone]), and [facility] [place] ([phone]) to verify dosing for patient's Percocet and IV cefazolin. Patient was discharged from [facility] last week and has continued to infuse the IV cefazolin at home.    Allergies reviewed with patient. NKDA.    Outpatient antibiotics within the last 30 days: Patient is on 2 g IV cefazolin every 8 hours. Patient states he is on week 8 of this antibiotic.    ANTICOAGULATION: Patient is on ELIQUIS; last dose 5/24/2024 at around 08:00.    Patient's preferred pharmacy for discharge medications: [facility] in [place].

## 2024-05-25 NOTE — ASSESSMENT & PLAN NOTE
No dka  Diabetic diet  Continue long acting insulin and SSI  Hypoglycemic protocol  Following  BS's are doing ok, WBC and CMP are ok.  I personally reviewed each of these labs on 5/29

## 2024-05-25 NOTE — ASSESSMENT & PLAN NOTE
Subacute  Query jasmeet due to body habitus, sleep study recommended as outpatient   IS  RT  Following  Stable on recent baseline of 2L

## 2024-05-25 NOTE — PROGRESS NOTES
RN unable to pull back from PICC. Lab notified to draw labs, NOC APRN notified, orders received.    RN called pharmacy to verify alteplase ready for pickup.    Pharmacy to notify RN when alteplase is ready.     0230 alteplase picked up and administered to pt.    0300 alteplase successful, blood return noted.

## 2024-05-25 NOTE — CARE PLAN
The patient is Watcher - Medium risk of patient condition declining or worsening    Shift Goals  Clinical Goals: Pt will be evaluated by wound team by the end of shift.  Patient Goals: Go home  Family Goals: GABE  Pt evaluated by wound team and dressing change orders were place.    Progress made toward(s) clinical / shift goals:    Problem: Knowledge Deficit - Standard  Goal: Patient and family/care givers will demonstrate understanding of plan of care, disease process/condition, diagnostic tests and medications  Outcome: Progressing  Pt verbalized understanding of importance of CT scan and administration of ABX.     Problem: Fall Risk  Goal: Patient will remain free from falls  Outcome: Progressing   Pt remained free from falls throughout shift with hourly rounding, education on use of call light, and bed alarm on.    Patient is not progressing towards the following goals:NA

## 2024-05-25 NOTE — PROGRESS NOTES
Hospital Medicine Daily Progress Note    Date of Service  5/25/2024    Chief Complaint  Chuy Moody is a 59 y.o. male admitted 5/24/2024 with drainage from right proximal clavicle.    Hospital Course  Chuy Moody is a 59 y.o. male with history of DM and recent hospitalization for osteomyelitis of the right clavicle. He had been hospitalized at [facility] for several weeks during which time he underwent surgery of the infected clavicle. That hospital stay was complicated by a DVT of the upper extremity and he was therefore started  He was then transferred to an LTAC and was reportedly discharged to home with a picc line and iv cefazolin last week.  Several days ago, he noticed some discharge from the surgical site and went an urgent care for evaluation and they referred him to the ER. Therefore he presented here on 5/24/2024. He also suffered a mechanical ground level fall at the urgent care, tripping over carpet that was sticking up, he has a mild abrasion on his left elbow and both knees from this, he denies any acute pain, no loc, no head injury and a CT of the head was checked in the ER as he is on blood thinner and no acute findings. Blood cultures were drawn and I consulted Dr. Lawton of ID.     Interval Problem Update  5/25: Patient states that he had a large amount of drainage from his right clavicle after 6 weeks of IV antibiotics.  Chest x-ray on admission is not adequate to evaluate for abscess.  I did order CT chest with IV contrast that showed significant fluid at the sternal clavicular joint suspicious for abscess and/or septic arthritis.  Several lymph nodes are noted and fluid below the sternoclavicular joint.  Discussed with orthopedics Dr. Donato who reviewed CT chest and agreed that patient needed to go to the OR in a.m. for washout, new or cultures.  Bilateral pleural effusions also noted.  Patient has been on Lasix in the past.  For now given IV contrast will be n.p.o. and on IV  vancomycin, hold on IV Lasix for now.  Holding Eliquis dose tonight n.p.o. at midnight coags in a.m. repeat CBC BMP.    I have discussed this patient's plan of care and discharge plan at IDT rounds today with Case Management, Nursing, Nursing leadership, and other members of the IDT team.    Consultants/Specialty  infectious disease and orthopedics    Code Status  Full Code    Disposition  The patient is not medically cleared for discharge to home or a post-acute facility.  Anticipate discharge to: home with close outpatient follow-up    I have placed the appropriate orders for post-discharge needs.    Review of Systems  Review of Systems   Constitutional:  Negative for chills, diaphoresis, fever and malaise/fatigue.   HENT:  Negative for congestion and sore throat.    Eyes:  Negative for pain and discharge.   Respiratory:  Positive for shortness of breath. Negative for cough, hemoptysis, sputum production and wheezing.    Cardiovascular:  Positive for leg swelling. Negative for chest pain, palpitations and claudication.   Gastrointestinal:  Negative for abdominal pain, constipation, diarrhea, melena, nausea and vomiting.   Genitourinary:  Negative for dysuria, frequency and urgency.   Musculoskeletal:  Positive for joint pain (Right clavicular sternal joint). Negative for back pain, myalgias and neck pain.   Skin:  Negative for itching and rash.   Neurological:  Negative for dizziness, sensory change, speech change, focal weakness, loss of consciousness, weakness and headaches.   Endo/Heme/Allergies:  Does not bruise/bleed easily.   Psychiatric/Behavioral:  Negative for depression, substance abuse and suicidal ideas.         Physical Exam  Temp:  [36.1 °C (96.9 °F)-36.2 °C (97.2 °F)] 36.1 °C (97 °F)  Pulse:  [62-84] 78  Resp:  [16-18] 18  BP: (138-169)/(68-84) 156/75  SpO2:  [92 %-97 %] 95 %    Physical Exam  Constitutional:       General: He is not in acute distress.     Appearance: Normal appearance. He is not  diaphoretic.   HENT:      Head: Normocephalic and atraumatic.      Mouth/Throat:      Mouth: Mucous membranes are moist.      Pharynx: No oropharyngeal exudate.   Eyes:      General: No scleral icterus.        Right eye: No discharge.         Left eye: No discharge.      Conjunctiva/sclera: Conjunctivae normal.      Pupils: Pupils are equal, round, and reactive to light.   Neck:      Thyroid: No thyromegaly.      Vascular: No JVD.      Trachea: No tracheal deviation.   Cardiovascular:      Rate and Rhythm: Normal rate and regular rhythm.      Pulses: Normal pulses.      Heart sounds: Normal heart sounds. No murmur heard.     No friction rub. No gallop.   Pulmonary:      Effort: Pulmonary effort is normal. No respiratory distress.      Breath sounds: Normal breath sounds. No wheezing or rales.      Comments: Right proximal clavicle with 2 cm x 1 cm open skin lesion noted with mild surrounding erythema.  He is tender to touch at bandage site.  Chest:      Chest wall: No tenderness.   Abdominal:      General: Abdomen is flat. Bowel sounds are normal. There is no distension.      Palpations: Abdomen is soft. There is no mass.      Tenderness: There is no abdominal tenderness. There is no guarding or rebound.   Musculoskeletal:         General: No tenderness. Normal range of motion.      Cervical back: Normal range of motion and neck supple.      Right lower leg: Edema present.      Left lower leg: Edema present.   Lymphadenopathy:      Cervical: No cervical adenopathy.   Skin:     General: Skin is warm and dry.      Findings: No erythema or rash.   Neurological:      General: No focal deficit present.      Mental Status: He is alert and oriented to person, place, and time.      Cranial Nerves: No cranial nerve deficit.      Motor: No abnormal muscle tone.   Psychiatric:         Mood and Affect: Mood normal.         Behavior: Behavior normal.         Thought Content: Thought content normal.         Judgment: Judgment  normal.         Fluids  No intake or output data in the 24 hours ending 05/25/24 1720    Laboratory  Recent Labs     05/24/24  1443 05/25/24  0459   WBC 4.6* 4.1*   RBC 3.90* 3.62*   HEMOGLOBIN 10.6* 9.8*   HEMATOCRIT 33.0* 31.7*   MCV 84.6 87.6   MCH 27.2 27.1   MCHC 32.1* 30.9*   RDW 41.1 42.8   PLATELETCT 394 387   MPV 9.1 9.2     Recent Labs     05/24/24  1443 05/25/24  0459   SODIUM 140 141   POTASSIUM 3.7 3.4*   CHLORIDE 103 105   CO2 23 24   GLUCOSE 107* 101*   BUN 10 7*   CREATININE 0.53 0.46*   CALCIUM 9.3 8.6     Recent Labs     05/24/24  1443   APTT 30.9   INR 1.13               Imaging  CT-CHEST (THORAX) WITH   Final Result      1.  There are changes involving the medial head of the right clavicle and sternoclavicular joint consistent with osteomyelitis/septic arthritis. There is a small amount of surrounding fluid with ill-defined phlegmonous change which could be a small    abscess.   2.  There are nonspecific superior mediastinal nodes, probably reactive inflammatory nodes.   3.  There is a moderate dependent right and a small dependent left pleural effusion.   4.  Bibasilar airspace disease is most consistent with atelectasis. No pneumonia or pneumothorax.      Fleischner Society pulmonary nodule recommendations:   Not Applicable         DX-CLAVICLE RIGHT   Final Result      No fracture detected.      CT-HEAD W/O   Final Result      Head CT without contrast within normal limits. No evidence of acute cerebral infarction, hemorrhage or mass lesion.         DX-FOOT-COMPLETE 3+ RIGHT   Final Result      1.  No acute osseous abnormality. If symptoms persist, follow-up radiographs can be obtained in 7-10 days.   2.  Plantar calcaneal enthesophyte.      DX-CHEST-PORTABLE (1 VIEW)   Final Result      1. Right lung basilar opacity with moderate right pleural effusion, concerning for pneumonia or atelectasis.   2. Appropriate position of the left upper extremity PIC catheter.   3. Normal cardiomediastinal  silhouette size.           Assessment/Plan  * Septic arthritis involving sternum (HCC)- (present on admission)  Assessment & Plan    No evidence of sirs or sepsis  B/c drawn  Picc in place and c/d  Culture of wound pending.  Martir HOPKINS consulted.  Continue ancef for now, since this occurred on abx will add vanc until further instructions from martir HOPKINS  5/25:  CT chest with contrast concerning for abscess and septic arthritis at right clavicular sternal joint. LNS noted and fluid collection below joint noted. Dr. Donato reviewed, needs OR I&D in a.m.  patient and friend aware of findings.    Will need OR cultures.  Plan to consult ID in a.m.      Pleural effusion on right- (present on admission)  Assessment & Plan  Will likely need IR thoracentesis.   Plan to start lasix post op.  Check echo.    Chronic deep vein thrombosis (DVT) of right upper extremity (HCC)- (present on admission)  Assessment & Plan  Sub acute  Continue eliquis    Leukopenia- (present on admission)  Assessment & Plan  Mild  following    Diabetic ulcer of foot, limited to breakdown of skin (HCC)- (present on admission)  Assessment & Plan  R foot  Present on admission  Wound care  following    Hypoxia- (present on admission)  Assessment & Plan  Subacute  Query jasmeet due to body habitus, sleep study recommended as outpatient   IS  RT  Following  Stable on recent baseline of 2L    DM (diabetes mellitus) ([facility])- (present on admission)  Assessment & Plan  No dka  Diabetic diet  Continue long acting insulin and SSI  Hypoglycemic protocol  following         VTE prophylaxis:   SCDs/TEDs      I have performed a physical exam and reviewed and updated ROS and Plan today (5/25/2024). In review of yesterday's note (5/24/2024), there are no changes except as documented above.

## 2024-05-25 NOTE — WOUND TEAM
[facility]  Inpatient Services  Initial Wound and Skin Care Evaluation    Admission Date: 5/24/2024     Last order of IP CONSULT TO WOUND CARE was found on 5/25/2024 from Hospital Encounter on 5/24/2024     HPI, PMH, SH: Reviewed    History reviewed. No pertinent surgical history.  Social History     Tobacco Use    Smoking status: Never    Smokeless tobacco: Never   Substance Use Topics    Alcohol use: Yes     Chief Complaint   Patient presents with    Sent by MD     Pt was sent by  today for further evaluation of leaking wound near clavicle. (Hx of sepsis 8wks ago)   Pt also noted wound to R foot. Hx diabetes       GLF     Today while walking out of  pt tripped on uneven floor. . R shoulder pain. -HS +thinners -LOC       Diagnosis: Cellulitis [L03.90]    Unit where seen by Wound Team: S615/02     WOUND CONSULT RELATED TO:  Chest, R foot    WOUND TEAM PLAN OF CARE - Frequency of Follow-up:   Nursing to follow dressing orders written for wound care. Contact wound team if area fails to progress, deteriorates or with any questions/concerns if something comes up before next scheduled follow up (See below as to whether wound is following and frequency of wound follow up)   Not following, consult as needed  - Chest, R foot    WOUND HISTORY:  Chuy Moody is a 59 y.o. male with history of DM and recent hospitalization for osteomyelitis of the right clavicle. He had been hospitalized at [facility] for several weeks during which time he underwent surgery of the infected clavicle. That hospital stay was complicated by a DVT of the upper extremity and he was therefore started  He was then transferred to an LTAC and was reportedly discharged to home with a picc line and iv cefazolin last week.  Several days ago, he noticed some discharge from the surgical site and went an urgent care for evaluation and they referred him to the ER. Therefore he presented here on 5/24/2024        WOUND ASSESSMENT/LDA  Wound  05/25/24 Chest Upper Right (Active)   Date First Assessed/Time First Assessed: 05/25/24 0126   Present on Original Admission: Yes  Wound Approximate Age at First Assessment (Weeks): 4 weeks  Hand Hygiene Completed: Yes  Location: Chest  Wound Orientation: Upper  Laterality: Right      Assessments 5/25/2024 10:30 AM   Wound Image       Site Assessment Pink;Yellow;Slough;Red   Periwound Assessment Pink;Red;Intact   Margins Attached edges   Closure Secondary intention   Drainage Amount Scant   Drainage Description Serous   Treatments Cleansed;Nonselective debridement;Site care   Wound Cleansing Normal Saline Irrigation   Periwound Protectant No-sting Skin Prep   Dressing Status Clean;Dry;Intact   Dressing Changed New   Dressing Cleansing/Solutions Not Applicable   Dressing Options Hydrofiber Silver;Hypafix Tape   Dressing Change/Treatment Frequency Every 48 hrs, and As Needed   NEXT Dressing Change/Treatment Date 05/27/24   Wound Length (cm) 1.2 cm   Wound Width (cm) 2.3 cm   Wound Surface Area (cm^2) 2.76 cm^2       Wound 05/25/24 Diabetic Ulcer Foot Anterior Right (Active)   Date First Assessed/Time First Assessed: 05/25/24 0126   Present on Original Admission: Yes  Wound Approximate Age at First Assessment (Weeks): 4 weeks  Hand Hygiene Completed: Yes  Primary Wound Type: Diabetic Ulcer  Location: Foot  Wound Orientation...      Assessments 5/25/2024 10:30 AM   Wound Image      Site Assessment [place];Pink   Periwound Assessment Dry;Intact;Flaky   Margins Attached edges   Closure Adhesive bandage   Drainage Amount Scant   Drainage Description Serous   Treatments Cleansed;Nonselective debridement;Site care   Wound Cleansing Normal Saline Irrigation   Periwound Protectant No-sting Skin Prep   Dressing Status Clean;Dry;Intact   Dressing Changed New   Dressing Cleansing/Solutions Not Applicable   Dressing Options Hydrofiber Silver;Hypafix Tape   Dressing Change/Treatment Frequency Every 48 hrs, and As Needed   NEXT Dressing  Change/Treatment Date 05/27/24   Non-staged Wound Description Partial thickness   Wound Length (cm) 1 cm   Wound Width (cm) 0.8 cm   Wound Surface Area (cm^2) 0.8 cm^2        Vascular:    UNIQUE:   No results found.    Lab Values:    Lab Results   Component Value Date/Time    WBC 4.1 (L) 05/25/2024 04:59 AM    RBC 3.62 (L) 05/25/2024 04:59 AM    HEMOGLOBIN 9.8 (L) 05/25/2024 04:59 AM    HEMATOCRIT 31.7 (L) 05/25/2024 04:59 AM    CREACTPROT 0.58 05/24/2024 02:43 PM    SEDRATEWES 40 (H) 05/24/2024 02:43 PM         Culture Results show:  No results found for this or any previous visit (from the past 720 hour(s)).    Pain Level/Medicated:  None, Tolerated without pain medication       INTERVENTIONS BY WOUND TEAM:  Chart and images reviewed. Discussed with bedside RN. All areas of concern (based on picture review, LDA review and discussion with bedside RN) have been thoroughly assessed. Documentation of areas based on significant findings. This RN in to assess patient. Performed standard wound care which includes appropriate positioning, dressing removal and non-selective debridement. Pictures and measurements obtained weekly if/when required.    Wound:  R foot, Chest  Cleansed/Non-selectively Debrided with:  Normal Saline and Gauze  Kathryn wound: Cleansed with Normal Saline and Gauze, Prepped with No Sting  Primary Dressing:  Aquacel AG  Secondary (Outer) Dressing: hypafix tape    Advanced Wound Care Discharge Planning  Number of Clinicians necessary to complete wound care: 1  Is patient requiring IV pain medications for dressing changes:  No   Length of time for dressing change 30 min. (This does not include chart review, pre-medication time, set up, clean up or time spent charting.)    Interdisciplinary consultation: Patient, Bedside RN, N/A.  Pressure injury and staging reviewed with N/A.    EVALUATION / RATIONALE FOR TREATMENT:     Date:  05/25/24  Wound Status:  Initial evaluation    R foot: Tan/pink tissue on lateral  plantar wound. Area was cleansed, 75% of tan tissue sloughed off during this time. The area is slightly boggy, and shallow. This wound appears close to the surface and is expected to heal. However, patient does have diabetes. The patient was educated on proper diabetic foot care management to prevent injury or ulcerations from occurring. No callus was palpated, periwound is dry and flaky. Aquacel Ag Hydrofiber applied to manage bioburden, absorb exudate, and maintain a moist wound environment without laterally wicking exudate therefore reducing diane-wound maceration    Chest: Patient with known chest wound from previous surgical procedure. Wound does not appear to have depth, unable to probe as opening is currently closed, however, there is an area of skin that has slough on it that was mechanically removed during cleansing. Scant serous drainage present. Aquacel AG placed over site.     B heels & sacrum: Intact and blanching.    NURSING PLAN OF CARE ORDERS:  Dressing changes: See Dressing Care orders    NUTRITION RECOMMENDATIONS   Wound Team Recommendations:  N/A    DIET ORDERS (From admission to next 24h)       Start     Ordered    05/24/24 1741  Diet Order Diet: Consistent CHO (Diabetic)  ALL MEALS        Question:  Diet:  Answer:  Consistent CHO (Diabetic)    05/24/24 1741                    PREVENTATIVE INTERVENTIONS:    Q shift Jaron - performed per nursing policy  Q shift pressure point assessments - performed per nursing policy    Anticipated discharge plans:  N/A        Vac Discharge Needs:  Vac Discharge plan is purely a recommendation from wound team and not a requirement for discharge unless otherwise stated by physician.  Not Applicable Pt not on a wound vac

## 2024-05-26 ENCOUNTER — APPOINTMENT (OUTPATIENT)
Dept: CARDIOLOGY | Facility: MEDICAL CENTER | Age: 59
End: 2024-05-26
Attending: HOSPITALIST
Payer: COMMERCIAL

## 2024-05-26 ENCOUNTER — ANESTHESIA (OUTPATIENT)
Dept: SURGERY | Facility: MEDICAL CENTER | Age: 59
End: 2024-05-26
Payer: COMMERCIAL

## 2024-05-26 ENCOUNTER — ANESTHESIA EVENT (OUTPATIENT)
Dept: SURGERY | Facility: MEDICAL CENTER | Age: 59
End: 2024-05-26
Payer: COMMERCIAL

## 2024-05-26 PROBLEM — M86.111: Status: ACTIVE | Noted: 2024-05-26

## 2024-05-26 LAB
ANION GAP SERPL CALC-SCNC: 13 MMOL/L (ref 7–16)
APTT PPP: 32 SEC (ref 24.7–36)
BACTERIA WND AEROBE CULT: ABNORMAL
BACTERIA WND AEROBE CULT: ABNORMAL
BASOPHILS # BLD AUTO: 0.6 % (ref 0–1.8)
BASOPHILS # BLD: 0.02 K/UL (ref 0–0.12)
BUN SERPL-MCNC: 7 MG/DL (ref 8–22)
CALCIUM SERPL-MCNC: 8.8 MG/DL (ref 8.5–10.5)
CHLORIDE SERPL-SCNC: 105 MMOL/L (ref 96–112)
CO2 SERPL-SCNC: 24 MMOL/L (ref 20–33)
CREAT SERPL-MCNC: 0.53 MG/DL (ref 0.5–1.4)
CRP SERPL HS-MCNC: 0.37 MG/DL (ref 0–0.75)
EKG IMPRESSION: NORMAL
EOSINOPHIL # BLD AUTO: 0.21 K/UL (ref 0–0.51)
EOSINOPHIL NFR BLD: 5.9 % (ref 0–6.9)
ERYTHROCYTE [DISTWIDTH] IN BLOOD BY AUTOMATED COUNT: 43.1 FL (ref 35.9–50)
ERYTHROCYTE [SEDIMENTATION RATE] IN BLOOD BY WESTERGREN METHOD: 26 MM/HOUR (ref 0–20)
FUNGUS SPEC FUNGUS STN: NORMAL
GFR SERPLBLD CREATININE-BSD FMLA CKD-EPI: 114 ML/MIN/1.73 M 2
GLUCOSE BLD STRIP.AUTO-MCNC: 155 MG/DL (ref 65–99)
GLUCOSE BLD STRIP.AUTO-MCNC: 244 MG/DL (ref 65–99)
GLUCOSE BLD STRIP.AUTO-MCNC: 264 MG/DL (ref 65–99)
GLUCOSE SERPL-MCNC: 119 MG/DL (ref 65–99)
GRAM STN SPEC: ABNORMAL
GRAM STN SPEC: NORMAL
HCT VFR BLD AUTO: 31.3 % (ref 42–52)
HGB BLD-MCNC: 9.7 G/DL (ref 14–18)
IMM GRANULOCYTES # BLD AUTO: 0 K/UL (ref 0–0.11)
IMM GRANULOCYTES NFR BLD AUTO: 0 % (ref 0–0.9)
INR PPP: 1.05 (ref 0.87–1.13)
LV EJECT FRACT  99904: 60
LV EJECT FRACT MOD 2C 99903: 48.9
LV EJECT FRACT MOD 4C 99902: 77.5
LV EJECT FRACT MOD BP 99901: 66.64
LYMPHOCYTES # BLD AUTO: 1.25 K/UL (ref 1–4.8)
LYMPHOCYTES NFR BLD: 35.1 % (ref 22–41)
MCH RBC QN AUTO: 27.2 PG (ref 27–33)
MCHC RBC AUTO-ENTMCNC: 31 G/DL (ref 32.3–36.5)
MCV RBC AUTO: 87.9 FL (ref 81.4–97.8)
MONOCYTES # BLD AUTO: 0.52 K/UL (ref 0–0.85)
MONOCYTES NFR BLD AUTO: 14.6 % (ref 0–13.4)
NEUTROPHILS # BLD AUTO: 1.56 K/UL (ref 1.82–7.42)
NEUTROPHILS NFR BLD: 43.8 % (ref 44–72)
NRBC # BLD AUTO: 0 K/UL
NRBC BLD-RTO: 0 /100 WBC (ref 0–0.2)
PLATELET # BLD AUTO: 396 K/UL (ref 164–446)
PMV BLD AUTO: 9.5 FL (ref 9–12.9)
POTASSIUM SERPL-SCNC: 3.2 MMOL/L (ref 3.6–5.5)
PROTHROMBIN TIME: 13.9 SEC (ref 12–14.6)
RBC # BLD AUTO: 3.56 M/UL (ref 4.7–6.1)
SIGNIFICANT IND 70042: ABNORMAL
SIGNIFICANT IND 70042: NORMAL
SIGNIFICANT IND 70042: NORMAL
SITE SITE: ABNORMAL
SITE SITE: NORMAL
SITE SITE: NORMAL
SODIUM SERPL-SCNC: 142 MMOL/L (ref 135–145)
SOURCE SOURCE: ABNORMAL
SOURCE SOURCE: NORMAL
SOURCE SOURCE: NORMAL
WBC # BLD AUTO: 3.6 K/UL (ref 4.8–10.8)

## 2024-05-26 PROCEDURE — 99233 SBSQ HOSP IP/OBS HIGH 50: CPT | Performed by: HOSPITALIST

## 2024-05-26 PROCEDURE — 160035 HCHG PACU - 1ST 60 MINS PHASE I: Performed by: ORTHOPAEDIC SURGERY

## 2024-05-26 PROCEDURE — 80048 BASIC METABOLIC PNL TOTAL CA: CPT

## 2024-05-26 PROCEDURE — 700105 HCHG RX REV CODE 258: Performed by: HOSPITALIST

## 2024-05-26 PROCEDURE — 87015 SPECIMEN INFECT AGNT CONCNTJ: CPT | Mod: 91

## 2024-05-26 PROCEDURE — 23044 ARTHRT AC SC JT EXP/RMVL FB: CPT | Mod: 59,RT | Performed by: ORTHOPAEDIC SURGERY

## 2024-05-26 PROCEDURE — 93356 MYOCRD STRAIN IMG SPCKL TRCK: CPT | Performed by: INTERNAL MEDICINE

## 2024-05-26 PROCEDURE — A9270 NON-COVERED ITEM OR SERVICE: HCPCS | Performed by: ANESTHESIOLOGY

## 2024-05-26 PROCEDURE — 82962 GLUCOSE BLOOD TEST: CPT

## 2024-05-26 PROCEDURE — 85025 COMPLETE CBC W/AUTO DIFF WBC: CPT

## 2024-05-26 PROCEDURE — 87070 CULTURE OTHR SPECIMN AEROBIC: CPT

## 2024-05-26 PROCEDURE — 85652 RBC SED RATE AUTOMATED: CPT

## 2024-05-26 PROCEDURE — 700102 HCHG RX REV CODE 250 W/ 637 OVERRIDE(OP): Performed by: HOSPITALIST

## 2024-05-26 PROCEDURE — 99223 1ST HOSP IP/OBS HIGH 75: CPT | Mod: 57 | Performed by: ORTHOPAEDIC SURGERY

## 2024-05-26 PROCEDURE — 23180 PRTL EXCISION BONE CLAVICLE: CPT | Mod: RT | Performed by: ORTHOPAEDIC SURGERY

## 2024-05-26 PROCEDURE — 700111 HCHG RX REV CODE 636 W/ 250 OVERRIDE (IP): Mod: JZ | Performed by: ANESTHESIOLOGY

## 2024-05-26 PROCEDURE — 87205 SMEAR GRAM STAIN: CPT

## 2024-05-26 PROCEDURE — 700111 HCHG RX REV CODE 636 W/ 250 OVERRIDE (IP): Performed by: HOSPITALIST

## 2024-05-26 PROCEDURE — A9270 NON-COVERED ITEM OR SERVICE: HCPCS | Performed by: HOSPITALIST

## 2024-05-26 PROCEDURE — 87206 SMEAR FLUORESCENT/ACID STAI: CPT

## 2024-05-26 PROCEDURE — 160002 HCHG RECOVERY MINUTES (STAT): Performed by: ORTHOPAEDIC SURGERY

## 2024-05-26 PROCEDURE — 85730 THROMBOPLASTIN TIME PARTIAL: CPT

## 2024-05-26 PROCEDURE — 160027 HCHG SURGERY MINUTES - 1ST 30 MINS LEVEL 2: Performed by: ORTHOPAEDIC SURGERY

## 2024-05-26 PROCEDURE — 160009 HCHG ANES TIME/MIN: Performed by: ORTHOPAEDIC SURGERY

## 2024-05-26 PROCEDURE — C1729 CATH, DRAINAGE: HCPCS | Performed by: ORTHOPAEDIC SURGERY

## 2024-05-26 PROCEDURE — 160038 HCHG SURGERY MINUTES - EA ADDL 1 MIN LEVEL 2: Performed by: ORTHOPAEDIC SURGERY

## 2024-05-26 PROCEDURE — 93306 TTE W/DOPPLER COMPLETE: CPT | Mod: 26 | Performed by: INTERNAL MEDICINE

## 2024-05-26 PROCEDURE — 86140 C-REACTIVE PROTEIN: CPT

## 2024-05-26 PROCEDURE — 0RBJ0ZZ EXCISION OF RIGHT SHOULDER JOINT, OPEN APPROACH: ICD-10-PCS | Performed by: ORTHOPAEDIC SURGERY

## 2024-05-26 PROCEDURE — 160048 HCHG OR STATISTICAL LEVEL 1-5: Performed by: ORTHOPAEDIC SURGERY

## 2024-05-26 PROCEDURE — 87102 FUNGUS ISOLATION CULTURE: CPT

## 2024-05-26 PROCEDURE — 93010 ELECTROCARDIOGRAM REPORT: CPT | Performed by: INTERNAL MEDICINE

## 2024-05-26 PROCEDURE — 770006 HCHG ROOM/CARE - MED/SURG/GYN SEMI*

## 2024-05-26 PROCEDURE — 93356 MYOCRD STRAIN IMG SPCKL TRCK: CPT

## 2024-05-26 PROCEDURE — 87116 MYCOBACTERIA CULTURE: CPT

## 2024-05-26 PROCEDURE — 0PB90ZZ EXCISION OF RIGHT CLAVICLE, OPEN APPROACH: ICD-10-PCS | Performed by: ORTHOPAEDIC SURGERY

## 2024-05-26 PROCEDURE — 85610 PROTHROMBIN TIME: CPT

## 2024-05-26 PROCEDURE — 700117 HCHG RX CONTRAST REV CODE 255: Performed by: FAMILY MEDICINE

## 2024-05-26 PROCEDURE — 700102 HCHG RX REV CODE 250 W/ 637 OVERRIDE(OP): Performed by: ANESTHESIOLOGY

## 2024-05-26 PROCEDURE — 87075 CULTR BACTERIA EXCEPT BLOOD: CPT

## 2024-05-26 PROCEDURE — 94669 MECHANICAL CHEST WALL OSCILL: CPT

## 2024-05-26 RX ORDER — KETOROLAC TROMETHAMINE 15 MG/ML
INJECTION, SOLUTION INTRAMUSCULAR; INTRAVENOUS PRN
Status: DISCONTINUED | OUTPATIENT
Start: 2024-05-26 | End: 2024-05-26 | Stop reason: SURG

## 2024-05-26 RX ORDER — OXYCODONE HCL 5 MG/5 ML
10 SOLUTION, ORAL ORAL
Status: COMPLETED | OUTPATIENT
Start: 2024-05-26 | End: 2024-05-26

## 2024-05-26 RX ORDER — ACETAMINOPHEN 325 MG/1
650 TABLET ORAL EVERY 6 HOURS PRN
Status: DISCONTINUED | OUTPATIENT
Start: 2024-05-26 | End: 2024-05-29 | Stop reason: HOSPADM

## 2024-05-26 RX ORDER — OXYCODONE HYDROCHLORIDE 5 MG/1
5 TABLET ORAL EVERY 4 HOURS PRN
Status: DISCONTINUED | OUTPATIENT
Start: 2024-05-26 | End: 2024-05-29 | Stop reason: HOSPADM

## 2024-05-26 RX ORDER — METOPROLOL TARTRATE 1 MG/ML
1 INJECTION, SOLUTION INTRAVENOUS
Status: DISCONTINUED | OUTPATIENT
Start: 2024-05-26 | End: 2024-05-26 | Stop reason: HOSPADM

## 2024-05-26 RX ORDER — ONDANSETRON 2 MG/ML
4 INJECTION INTRAMUSCULAR; INTRAVENOUS
Status: DISCONTINUED | OUTPATIENT
Start: 2024-05-26 | End: 2024-05-26 | Stop reason: HOSPADM

## 2024-05-26 RX ORDER — SODIUM CHLORIDE, SODIUM LACTATE, POTASSIUM CHLORIDE, CALCIUM CHLORIDE 600; 310; 30; 20 MG/100ML; MG/100ML; MG/100ML; MG/100ML
INJECTION, SOLUTION INTRAVENOUS
Status: DISCONTINUED | OUTPATIENT
Start: 2024-05-26 | End: 2024-05-26 | Stop reason: SURG

## 2024-05-26 RX ORDER — HYDRALAZINE HYDROCHLORIDE 20 MG/ML
5 INJECTION INTRAMUSCULAR; INTRAVENOUS
Status: DISCONTINUED | OUTPATIENT
Start: 2024-05-26 | End: 2024-05-26 | Stop reason: HOSPADM

## 2024-05-26 RX ORDER — DIPHENHYDRAMINE HYDROCHLORIDE 50 MG/ML
12.5 INJECTION INTRAMUSCULAR; INTRAVENOUS
Status: DISCONTINUED | OUTPATIENT
Start: 2024-05-26 | End: 2024-05-26 | Stop reason: HOSPADM

## 2024-05-26 RX ORDER — HYDROMORPHONE HYDROCHLORIDE 1 MG/ML
0.4 INJECTION, SOLUTION INTRAMUSCULAR; INTRAVENOUS; SUBCUTANEOUS
Status: DISCONTINUED | OUTPATIENT
Start: 2024-05-26 | End: 2024-05-26 | Stop reason: HOSPADM

## 2024-05-26 RX ORDER — POTASSIUM CHLORIDE 20 MEQ/1
40 TABLET, EXTENDED RELEASE ORAL 2 TIMES DAILY
Status: COMPLETED | OUTPATIENT
Start: 2024-05-26 | End: 2024-05-27

## 2024-05-26 RX ORDER — LABETALOL HYDROCHLORIDE 5 MG/ML
5 INJECTION, SOLUTION INTRAVENOUS
Status: DISCONTINUED | OUTPATIENT
Start: 2024-05-26 | End: 2024-05-26 | Stop reason: HOSPADM

## 2024-05-26 RX ORDER — ROCURONIUM BROMIDE 10 MG/ML
INJECTION, SOLUTION INTRAVENOUS PRN
Status: DISCONTINUED | OUTPATIENT
Start: 2024-05-26 | End: 2024-05-26 | Stop reason: SURG

## 2024-05-26 RX ORDER — ONDANSETRON 2 MG/ML
INJECTION INTRAMUSCULAR; INTRAVENOUS PRN
Status: DISCONTINUED | OUTPATIENT
Start: 2024-05-26 | End: 2024-05-26 | Stop reason: SURG

## 2024-05-26 RX ORDER — EPHEDRINE SULFATE 50 MG/ML
5 INJECTION, SOLUTION INTRAVENOUS
Status: DISCONTINUED | OUTPATIENT
Start: 2024-05-26 | End: 2024-05-26 | Stop reason: HOSPADM

## 2024-05-26 RX ORDER — IPRATROPIUM BROMIDE AND ALBUTEROL SULFATE 2.5; .5 MG/3ML; MG/3ML
3 SOLUTION RESPIRATORY (INHALATION)
Status: DISCONTINUED | OUTPATIENT
Start: 2024-05-26 | End: 2024-05-26 | Stop reason: HOSPADM

## 2024-05-26 RX ORDER — MIDAZOLAM HYDROCHLORIDE 1 MG/ML
INJECTION INTRAMUSCULAR; INTRAVENOUS PRN
Status: DISCONTINUED | OUTPATIENT
Start: 2024-05-26 | End: 2024-05-26 | Stop reason: SURG

## 2024-05-26 RX ORDER — OXYCODONE HCL 5 MG/5 ML
5 SOLUTION, ORAL ORAL
Status: COMPLETED | OUTPATIENT
Start: 2024-05-26 | End: 2024-05-26

## 2024-05-26 RX ORDER — HYDROMORPHONE HYDROCHLORIDE 1 MG/ML
0.1 INJECTION, SOLUTION INTRAMUSCULAR; INTRAVENOUS; SUBCUTANEOUS
Status: DISCONTINUED | OUTPATIENT
Start: 2024-05-26 | End: 2024-05-26 | Stop reason: HOSPADM

## 2024-05-26 RX ORDER — DEXAMETHASONE SODIUM PHOSPHATE 4 MG/ML
INJECTION, SOLUTION INTRA-ARTICULAR; INTRALESIONAL; INTRAMUSCULAR; INTRAVENOUS; SOFT TISSUE PRN
Status: DISCONTINUED | OUTPATIENT
Start: 2024-05-26 | End: 2024-05-26 | Stop reason: SURG

## 2024-05-26 RX ORDER — LIDOCAINE HYDROCHLORIDE 20 MG/ML
INJECTION, SOLUTION EPIDURAL; INFILTRATION; INTRACAUDAL; PERINEURAL PRN
Status: DISCONTINUED | OUTPATIENT
Start: 2024-05-26 | End: 2024-05-26 | Stop reason: SURG

## 2024-05-26 RX ORDER — OXYCODONE HYDROCHLORIDE 10 MG/1
10 TABLET ORAL EVERY 4 HOURS PRN
Status: DISCONTINUED | OUTPATIENT
Start: 2024-05-26 | End: 2024-05-29 | Stop reason: HOSPADM

## 2024-05-26 RX ORDER — MEPERIDINE HYDROCHLORIDE 25 MG/ML
12.5 INJECTION INTRAMUSCULAR; INTRAVENOUS; SUBCUTANEOUS
Status: DISCONTINUED | OUTPATIENT
Start: 2024-05-26 | End: 2024-05-26 | Stop reason: HOSPADM

## 2024-05-26 RX ORDER — SODIUM CHLORIDE, SODIUM LACTATE, POTASSIUM CHLORIDE, CALCIUM CHLORIDE 600; 310; 30; 20 MG/100ML; MG/100ML; MG/100ML; MG/100ML
INJECTION, SOLUTION INTRAVENOUS CONTINUOUS
Status: DISCONTINUED | OUTPATIENT
Start: 2024-05-26 | End: 2024-05-26

## 2024-05-26 RX ORDER — HYDROMORPHONE HYDROCHLORIDE 1 MG/ML
0.2 INJECTION, SOLUTION INTRAMUSCULAR; INTRAVENOUS; SUBCUTANEOUS
Status: DISCONTINUED | OUTPATIENT
Start: 2024-05-26 | End: 2024-05-26 | Stop reason: HOSPADM

## 2024-05-26 RX ORDER — HALOPERIDOL 5 MG/ML
1 INJECTION INTRAMUSCULAR
Status: DISCONTINUED | OUTPATIENT
Start: 2024-05-26 | End: 2024-05-26 | Stop reason: HOSPADM

## 2024-05-26 RX ADMIN — ACETAMINOPHEN 650 MG: 325 TABLET, FILM COATED ORAL at 20:48

## 2024-05-26 RX ADMIN — AMLODIPINE BESYLATE 5 MG: 5 TABLET ORAL at 17:46

## 2024-05-26 RX ADMIN — CARVEDILOL 25 MG: 25 TABLET, FILM COATED ORAL at 17:46

## 2024-05-26 RX ADMIN — SODIUM CHLORIDE, POTASSIUM CHLORIDE, SODIUM LACTATE AND CALCIUM CHLORIDE: 600; 310; 30; 20 INJECTION, SOLUTION INTRAVENOUS at 08:38

## 2024-05-26 RX ADMIN — SUGAMMADEX 100 MG: 100 INJECTION, SOLUTION INTRAVENOUS at 09:18

## 2024-05-26 RX ADMIN — LISINOPRIL 40 MG: 20 TABLET ORAL at 04:31

## 2024-05-26 RX ADMIN — ONDANSETRON 4 MG: 2 INJECTION INTRAMUSCULAR; INTRAVENOUS at 08:54

## 2024-05-26 RX ADMIN — DEXAMETHASONE SODIUM PHOSPHATE 8 MG: 4 INJECTION INTRA-ARTICULAR; INTRALESIONAL; INTRAMUSCULAR; INTRAVENOUS; SOFT TISSUE at 08:54

## 2024-05-26 RX ADMIN — ROCURONIUM BROMIDE 50 MG: 50 INJECTION, SOLUTION INTRAVENOUS at 08:43

## 2024-05-26 RX ADMIN — KETOROLAC TROMETHAMINE 15 MG: 15 INJECTION, SOLUTION INTRAMUSCULAR; INTRAVENOUS at 08:54

## 2024-05-26 RX ADMIN — FENTANYL CITRATE 50 MCG: 50 INJECTION, SOLUTION INTRAMUSCULAR; INTRAVENOUS at 09:50

## 2024-05-26 RX ADMIN — DAPTOMYCIN 700 MG: 500 INJECTION, POWDER, LYOPHILIZED, FOR SOLUTION INTRAVENOUS at 20:45

## 2024-05-26 RX ADMIN — VANCOMYCIN HYDROCHLORIDE 1500 MG: 5 INJECTION, POWDER, LYOPHILIZED, FOR SOLUTION INTRAVENOUS at 12:23

## 2024-05-26 RX ADMIN — PROPOFOL 200 MG: 10 INJECTION, EMULSION INTRAVENOUS at 08:43

## 2024-05-26 RX ADMIN — AMLODIPINE BESYLATE 5 MG: 5 TABLET ORAL at 04:31

## 2024-05-26 RX ADMIN — OXYCODONE HYDROCHLORIDE 10 MG: 5 SOLUTION ORAL at 09:40

## 2024-05-26 RX ADMIN — CARVEDILOL 25 MG: 25 TABLET, FILM COATED ORAL at 11:43

## 2024-05-26 RX ADMIN — CEFAZOLIN 2 G: 2 INJECTION, POWDER, FOR SOLUTION INTRAMUSCULAR; INTRAVENOUS at 01:42

## 2024-05-26 RX ADMIN — INSULIN HUMAN 2 UNITS: 100 INJECTION, SOLUTION PARENTERAL at 17:45

## 2024-05-26 RX ADMIN — FENTANYL CITRATE 100 MCG: 50 INJECTION, SOLUTION INTRAMUSCULAR; INTRAVENOUS at 08:42

## 2024-05-26 RX ADMIN — SUGAMMADEX 200 MG: 100 INJECTION, SOLUTION INTRAVENOUS at 09:10

## 2024-05-26 RX ADMIN — CEFAZOLIN 2 G: 2 INJECTION, POWDER, FOR SOLUTION INTRAMUSCULAR; INTRAVENOUS at 11:44

## 2024-05-26 RX ADMIN — MIDAZOLAM HYDROCHLORIDE 2 MG: 1 INJECTION, SOLUTION INTRAMUSCULAR; INTRAVENOUS at 08:40

## 2024-05-26 RX ADMIN — HYDRALAZINE HYDROCHLORIDE 20 MG: 20 INJECTION, SOLUTION INTRAMUSCULAR; INTRAVENOUS at 04:34

## 2024-05-26 RX ADMIN — HUMAN ALBUMIN MICROSPHERES AND PERFLUTREN 3 ML: 10; .22 INJECTION, SOLUTION INTRAVENOUS at 08:45

## 2024-05-26 RX ADMIN — LIDOCAINE HYDROCHLORIDE 100 MG: 20 INJECTION, SOLUTION EPIDURAL; INFILTRATION; INTRACAUDAL at 08:43

## 2024-05-26 RX ADMIN — APIXABAN 5 MG: 5 TABLET, FILM COATED ORAL at 17:49

## 2024-05-26 RX ADMIN — INSULIN HUMAN 1 UNITS: 100 INJECTION, SOLUTION PARENTERAL at 12:19

## 2024-05-26 RX ADMIN — INSULIN GLARGINE-YFGN 8 UNITS: 100 INJECTION, SOLUTION SUBCUTANEOUS at 17:45

## 2024-05-26 RX ADMIN — FENTANYL CITRATE 50 MCG: 50 INJECTION, SOLUTION INTRAMUSCULAR; INTRAVENOUS at 09:40

## 2024-05-26 RX ADMIN — POTASSIUM CHLORIDE 40 MEQ: 1500 TABLET, EXTENDED RELEASE ORAL at 17:46

## 2024-05-26 RX ADMIN — INSULIN HUMAN 3 UNITS: 100 INJECTION, SOLUTION PARENTERAL at 20:52

## 2024-05-26 ASSESSMENT — ENCOUNTER SYMPTOMS
LOSS OF CONSCIOUSNESS: 0
NAUSEA: 0
FEVER: 0
DIZZINESS: 0
CHILLS: 0
DIARRHEA: 0
MYALGIAS: 0
WHEEZING: 0
COUGH: 0
DIAPHORESIS: 0
WEAKNESS: 0
SENSORY CHANGE: 0
DEPRESSION: 0
SPUTUM PRODUCTION: 0
PALPITATIONS: 0
HEMOPTYSIS: 0
SORE THROAT: 0
HEADACHES: 0
CLAUDICATION: 0
SPEECH CHANGE: 0
EYE DISCHARGE: 0
NECK PAIN: 0
SHORTNESS OF BREATH: 1
BACK PAIN: 0
FOCAL WEAKNESS: 0
VOMITING: 0
EYE PAIN: 0
BRUISES/BLEEDS EASILY: 0
ABDOMINAL PAIN: 0
CONSTIPATION: 0

## 2024-05-26 ASSESSMENT — PAIN DESCRIPTION - PAIN TYPE
TYPE: ACUTE PAIN
TYPE: SURGICAL PAIN
TYPE: ACUTE PAIN
TYPE: SURGICAL PAIN
TYPE: SURGICAL PAIN
TYPE: ACUTE PAIN
TYPE: ACUTE PAIN
TYPE: SURGICAL PAIN
TYPE: ACUTE PAIN

## 2024-05-26 ASSESSMENT — PAIN SCALES - GENERAL: PAIN_LEVEL: 2

## 2024-05-26 ASSESSMENT — LIFESTYLE VARIABLES: SUBSTANCE_ABUSE: 0

## 2024-05-26 NOTE — CARE PLAN
The patient is Stable - Low risk of patient condition declining or worsening    Shift Goals  Clinical Goals: patient will maitain hemodynamic stability through shift  Patient Goals: feel better  Family Goals: updated on poc    Progress made toward(s) clinical / shift goals:    Problem: Fall Risk  Goal: Patient will remain free from falls  Outcome: Progressing  Note: Calling appropriately, no bed alarm required.     Problem: Pain - Standard  Goal: Alleviation of pain or a reduction in pain to the patient’s comfort goal  Outcome: Progressing  Note: No pain since surgery.      Problem: Hemodynamics  Goal: Patient's hemodynamics, fluid balance and neurologic status will be stable or improve  Outcome: Progressing  Note: VSS on 2LNC.        Patient is not progressing towards the following goals:

## 2024-05-26 NOTE — ANESTHESIA POSTPROCEDURE EVALUATION
Patient: Chuy Moody    Procedure Summary       Date: 05/26/24 Room / Location: [place] [address] / SURGERY [facility]    Anesthesia Start: 0838 Anesthesia Stop: 0927    Procedure: IRRIGATION AND DEBRIDEMENT, CLAVICLE (Right: Chest) Diagnosis: (osteomyelitis of the right clavicle)    Surgeons: Kael Donato M.D. Responsible Provider: Venkata Agnulo M.D.    Anesthesia Type: general ASA Status: 2            Final Anesthesia Type: general  Last vitals  BP   Blood Pressure: (!) 140/64    Temp   36.5 °C (97.7 °F)    Pulse   82   Resp   16    SpO2   95 %      Anesthesia Post Evaluation    Patient location during evaluation: PACU  Patient participation: complete - patient participated  Level of consciousness: awake and alert  Pain score: 2    Airway patency: patent  Anesthetic complications: no  Cardiovascular status: hemodynamically stable  Respiratory status: acceptable  Hydration status: euvolemic    PONV: none          There were no known notable events for this encounter.     Nurse Pain Score: 0 (NPRS)

## 2024-05-26 NOTE — CARE PLAN
The patient is Stable - Low risk of patient condition declining or worsening    Shift Goals  Clinical Goals: Pt will be NPO at 0000 due to procedure in AM  Patient Goals: Update on POC  Family Goals: Update on POC    Progress made toward(s) clinical / shift goals:      Pt updated on POC and educated on medications in the moment. Pt verbalizes understanding of education and asks questions regarding POC/medications if needed.    Bed frame alarm on. Call light within reach. Bedside table within reach. Pt tolerates SBA to the restroom with steady gait.    Pt tolerating NPO since 0000. Pt had sips with AM medications.    Patient is not progressing towards the following goals:

## 2024-05-26 NOTE — ANESTHESIA PROCEDURE NOTES
Airway    Date/Time: 5/26/2024 8:46 AM    Performed by: Venkata Angulo M.D.  Authorized by: Venkata Angulo M.D.    Location:  OR  Urgency:  Elective  Difficult Airway: No    Indications for Airway Management:  Anesthesia      Spontaneous Ventilation: absent    Sedation Level:  Deep  Preoxygenated: Yes    Patient Position:  Sniffing  Mask Difficulty Assessment:  1 - vent by mask  Final Airway Type:  Endotracheal airway  Final Endotracheal Airway:  ETT  Cuffed: Yes    Technique Used for Successful ETT Placement:  Direct laryngoscopy    Insertion Site:  Oral  Blade Type:  Felix  Laryngoscope Blade/Videolaryngoscope Blade Size:  2  ETT Size (mm):  8.0  Measured from:  Teeth  ETT to Teeth (cm):  23  Placement Verified by: auscultation and capnometry    Cormack-Lehane Classification:  Grade I - full view of glottis  Number of Attempts at Approach:  1

## 2024-05-26 NOTE — ASSESSMENT & PLAN NOTE
5/26:  s/p OR bone biopsy and deep cultures, s/p OR wash out.  Corynebacterium striatum, changed ancef and vancomycin to daptomycin IV daily.  ID and ortho appreciated    Remains clinically stable on 5/28. Outpatient IV abx infusions have been set up and if patient does well overnight can likely be discharged on 5/29.

## 2024-05-26 NOTE — CONSULTS
DATE OF SERVICE:  05/26/2024     INFECTIOUS DISEASE CONSULTATION     CONSULT REQUESTED BY:  aMrissa Butt MD     REASON FOR CONSULTATION:  The patient with relapsing right sternoclavicular   joint osteomyelitis.     HISTORY OF PRESENT ILLNESS:  The patient is a 59-year-old male who is well   known to us from a previous hospitalization.  History was obtained from chart   review as well as discussed the case with the patient and Dr. Butt.     We first became acquainted with this patient back in 3/26/2024. He was seen   by me in the intensive care unit at [facility].  He had   presented there with Staph. aureus sepsis.  The patient grew Staph. aureus --   MSSA from his blood and subsequently from a surgical specimen of his right   sternoclavicular joint.     I was asked to see the patient in the intensive care unit because of   complaints of pain in his right shoulder. After evaluating the patient, I   determined that the patient also had pain in his right sternoclavicular joint.  In reviewing the CT scan of his chest obtained on admission with radiology,   It did confirm the patient had inflammation and swelling around his right   sternoclavicular joint.  The patient was seen in consultation by Dr. Carreon  and was taken to surgery on 3/31/2024 where he I&D'd the patient's right   sternoclavicular joint.  At that time, Staph aureus did grow from the joint.     The patient was placed on IV antibiotic therapy to be 6 weeks in duration.    He was stabilized at [facility] and then transferred   to [facility] where he completed a 6-week course of IV antibiotic therapy.     At the time of completion of his therapy at [facility], the patient still had some   drainage from the right sternoclavicular joint and he was discharged to the   outpatient setting by our service on Ancef 2 grams IV every 8 hours. The   patient developed a DVT in his right upper extremity while at [facility].       Apparently  on , the patient developed increasing pain in his right   sternoclavicular joint with drainage and went to an urgent care.  The patient   was then referred from there to [facility].The   patient does have a PICC line currently in his left arm.  He was seen in   the emergency room as mentioned above, and seen in consultation by the   by Dr. Kael Donato of the orthopedic service.  Dr. Donato took him   to surgery today where he I&D'd the patient's right sternoclavicular joint   removing a portion of the clavicular head and debriding the clivical.  In the   emergency room his SC joint was aspirated on  is grew    Corynebacterium striatum. At this point an infectious disease consult   has been requested.     History is as stated above.  The patient states his shoulder is feeling   slightly better following surgery, but he still under the effect of some   Anesthesia surgery, most likely.  He does deny any significant fever,   chills or sweats to me in conjunction with his presentation to the hospital.     The patient states that he was doing well otherwise in regard to his other   organ systems. He denied any nausea or vomiting.  He denied any   abdominal pain.  He denied any frequency, urgency or dysuria.  He   denied any other sore joints, muscles, rashes, sores or other problems.     ALLERGIES:  None.     ILLNESSES:  The patient has a history of hypertension and questionable history   of diabetes.  There is no diabetes in the patient's family, he states.     SURGERIES:  As mentioned above as well.  The patient had torn cartilage when   he was a child, he states in his knee.  He has had no problems or surgeries   since that time.     MEDICATIONS:  See admission H & P for details.  He was on cefazolin IV every 8   hours.     FAMILY HISTORY:  The patient's father has a history of coronary artery disease   and apparently had a CVA.  He was a smoker.  The patient's mother  of   lung  cancer.  She likewise was a smoker.     SOCIAL HISTORY:  The patient is .  He is a lifelong nonsmoker.  The   patient states he may have a couple of beers a day or no beers for several   days.  He denies any recent alcohol.  He denies any IV or recreational drug   use.  The patient was born in [place].  He has lived in [place].  He has   never been hospitalized for infection or other illnesses until this infection.  The patient is a retired  from the [employer] in   [place].     REVIEW OF SYSTEMS:  Negative other than as those listed above and below.  CENTRAL NERVOUS SYSTEM:  The patient states he has had a couple of concussions   playing sports as a youth.  Otherwise, no history of CVAs, TIAs, migraine   headache or other CNS problems such as seizures.     LUNGS:  The patient denies any history of asthma, tuberculosis, pulmonary   emboli, hemoptysis, COPD or other pulmonary problems.  CARDIAC:  The patient apparently has a history of hypertension, but is not on   medications.  He denies any history of myocardial infarction or vascular   disease.  GASTROINTESTINAL:  The patient denies any history of peptic ulcer disease,   GERD, hepatitis, irritable bowel, diverticulosis, hematochezia, hematemesis,   or unexplained weight loss.  GENITOURINARY:  The patient denies any history of frequent urinary tract   infections, renal stones or other genitourinary issues.  MUSCULOSKELETAL:  The patient denies any history of gout, other arthropathies   or skin conditions.     PHYSICAL EXAMINATION:    VITAL SIGNS:  T-max 97.8, last temperature charted 97.8, respirations 18,   pulse 77, blood pressure is 150/90.  He is on 4 liters O2 coming from surgery.    His pulse ox is 98%.  The patient weighs 113 kilograms.  He is 182.9 cm   tall.  His BMI is 33.79 kilograms per meter squared.  His BSA is 2.4.  GENERAL:  The patient is normally developed, normally nourished-appearing   white male who  appears in no acute respiratory distress at this time.  HEENT:  Head appears normocephalic.  Eyes Pupils appear equal, round, and   reactive to light.  Conjunctivae are not injected.  Nose and mouth, no acute   sores or lesions noted.  NECK:  Supple.  LUNGS:  Breath sounds are clear to auscultation bilaterally without wheezes or   rhonchi.  CHEST:  The patient has a dressing over his right sternoclavicular joint.  The   dressings are not removed as the patient is immediately out of surgery.    CARDIAC:  The patient has a regular rate and rhythm without obvious murmur.  BACK:  No sores or lesions are noted or reported.  ABDOMEN:  Soft, no significant tenderness, guarding, rigidity, or rebound.  :  No acute sores or lesions are noted or reported.    EXTREMITIES:  No acute sores or lesions are noted.    CNS: Mental status, the patient does appear oriented x3.  Motor and sensory   are grossly intact.     SUMMARY OF LABORATORY DATA:  From today, the patient's CBC shows a white blood   cell count of 3.6, 43.8% segs, 35.1% lymphs, 14.6% monos, 5.9% eos, and 0.6%   basophils.  There were no immature granulocytes.  Hemoglobin/hematocrit   9.7/31.3 with a platelet count of 396,000.  Also from today, the patient's   sodium is 142, potassium 3.2, chloride 105, bicarbonate 24.  Random glucose   119, BUN 7, creatinine 0.53, calcium 8.8, corrected 9.0, SGOT 13, SGPT 7,   alkaline phosphatase 78, total bilirubin 0.2, total protein 6.1, albumin 3.5   and globulin is 2.6.  As mentioned above, microbiology aspirate from the   patient's sternoclavicular joint is growing a corynebacterium striatum group   organism at 48 hours.     IMAGING STUDIES:  A CT scan of the patient's chest from the 25th showed   changes involving the medial head of the right clavicle and sternoclavicular   joint consistent with osteomyelitis/septic arthritis, small amount of   surrounding fluid with an ill-defined phlegmonous change was seen representing   a  small abscess.  There were nonspecific superior mediastinal nodes thought   to be reactive inflammatory, moderate dependent right and a small dependent   left pleural effusions were noted.  Bibasilar airspace disease, most   consistent with atelectasis.  No definitive pneumonitis was noted.     IMPRESSION:    1.  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  2.  Status post acute Staph aureus -- MSSA sepsis 03/25/2024.  3.  Leukopenia.  4.  Anemia of chronic disease.  5.  History of hypertension.  6.  History of elevated blood sugar.  7.  DVT right upper extremity -- currently on therapy.     DISCUSSION:  This patient unfortunately has had a somewhat prolonged   course in regard to the osteomyelitis involving his right sternoclavicular joint.    The patient initially presented back on March on the 25th with Staph aureus   Sepsis and was diagnosed with his MSSA sternoclavicular septic joint with  osteomyelitis.  He was treated appropriately with antibiotic therapy along   with surgery.  The patient did begin to improve and has been transferred to   [facility] for further therapy.     Unfortunately, while at [facility], the patient continued to have significant   Drainage from his SC joint and he was ultimately discharged from [facility] on   IV cefazolin.     The patient has come back to the hospital now and is growing a   Corynebacterium striatum group organism.  Corynebacterium striatum is   generally not sensitive to cephalosporins and does require daptomycin or   similar antibioitc for therapy.  It appears the patient has developed a secondary   infection in his right sternoclavicular joint.  The patient is currently on   vancomycin. He will be changed to daptomycin to cover his  Corynebacterium striatum The patient unfortunately once again require 6 weeks   of IV antibiotic therapy.  I have explained to the patient we are starting from   scratch again with a new bacteria that will  require 6 weeks of therapy.    Hopefully, with the debridement done by Dr. Donato, this is all that will   need to be done and he will not need any further debridement.  Dr. Donato   Has been aggressive with debridement, hopefully, removing all infected bone.     PLAN:    1.  Discontinue vancomycin.  2.  Begin daptomycin 700 mg IV every 24 hours.    3.  CPK level.    4.  Discontinue cefazolin.  5.  Continue to follow patient's cultures to completion from surgery.       Thank you for allowing me to see this interesting and complicated patient once  again in consultation to assist in his care.  Hopefully, this will be the   last infection for this patient.  He initially had a MSSA Staph aureus infection   and now he has a Corynebacterium striatum infection.     Total time spent with the patient, obtaining history, performing physical   exam, reviewing labs, reviewing with microbiology, reviewing old records,   reviewing operative note, reviewing with Dr. Butt, and dictating this note   60 minutes with at least 60% of time spent face-to-face and 100% of time spent  in direct patient care/counseling/consulting with new orders and   recommendations today.        ______________________________  MD RODERICK HARRISON/GAURI    DD:  05/26/2024 14:13  DT:  05/26/2024 15:15    Job#:  [id number]

## 2024-05-26 NOTE — ANESTHESIA PREPROCEDURE EVALUATION
Case: [id number] Date/Time: 05/26/24 0830    Procedure: IRRIGATION AND DEBRIDEMENT, CLAVICLE (Right)    Pre-op diagnosis: osteomyelitis of the right clavicle    Location: [facility] OR 16 / SURGERY [facility]    Surgeons: Kael Donato M.D.            Relevant Problems   CARDIAC   (positive) Chronic deep vein thrombosis (DVT) of right upper extremity (HCC)   (positive) Essential hypertension      Other   (positive) Septic arthritis involving sternum (HCC)       Physical Exam    Airway   Mallampati: III  TM distance: >3 FB  Neck ROM: full       Cardiovascular - normal exam  Rhythm: regular  Rate: normal  (-) murmur     Dental - normal exam           Pulmonary - normal exam  Breath sounds clear to auscultation     Abdominal   (+) obese     Neurological - normal exam                   Anesthesia Plan    ASA 2       Plan - general       Airway plan will be ETT          Induction: intravenous    Postoperative Plan: Postoperative administration of opioids is intended.    Pertinent diagnostic labs and testing reviewed    Informed Consent:    Anesthetic plan and risks discussed with patient.    Use of blood products discussed with: patient whom consented to blood products.

## 2024-05-26 NOTE — PROGRESS NOTES
Bedside report received from AWILDA Starr. Patient resting in bed. Call bell within reach, bed alarm on and in place. Pt states 5/10 pain. Pain managed with rest and repositioning. Visitor at bedside. No further needs at this time.

## 2024-05-26 NOTE — PROGRESS NOTES
2014 - Pt /94. Daquan LOPEZ notified.     2047 - Pt BP rechecked, 155/73. Daquan LOPEZ notified of recent BP. Orders were received for elevated BP.

## 2024-05-26 NOTE — OR NURSING
"Pt is aaox4, resting comfortably in hospital bed on 4lpm nasal cannula. His respirations are equal and unlabored, he is in no acute distress. He is treated for pain per MAR with good response. HE takes sips of water without difficulty or complaints of n/v. Surgical site is intact with scant drainage noted to dressing. Pt has a cough and stuffy nose noted, he informs that he has been dealing with allergies and has a cough \"here and there\". Pt does not complain of sob or difficulty breathing while in PACU. Will continue to monitor.    Handoff to [facility] [place] and aware of pt pending arrival to her floor.  "

## 2024-05-26 NOTE — CONSULTS
Date of Service: 05/26/24    Chuy Moody was seen today in consultation for right clavicle osteomyelitis at the request of Dr. Butt    CC: Right clavicle osteomyelitis    HPI: Chuy Moody is a 59 y.o. male who presents for right clavicle osteomyelitis and infected sternoclavicular joint.  Roughly 6 weeks ago the patient was admitted to [facility] and reportedly underwent biopsy and incision over the right sternoclavicular joint by Dr. Jalen rodríguez.  He was diagnosed with infection and was transferred to an LTAC for antibiotics.  He completed 6 weeks of these and continues to have a sinus tract and drainage from the previous incision site.  He does have history of diabetes.  He denies any other current infection.  At the time of the development of the clavicle osteomyelitis he also had some foot wounds that were present.    PMH: History reviewed. No pertinent past medical history.    PSH: History reviewed. No pertinent surgical history.    FH: History reviewed. No pertinent family history.    SH:   Social History     Socioeconomic History    Marital status:      Spouse name: Not on file    Number of children: Not on file    Years of education: Not on file    Highest education level: Not on file   Occupational History    Not on file   Tobacco Use    Smoking status: Never    Smokeless tobacco: Never   Substance and Sexual Activity    Alcohol use: Yes    Drug use: No    Sexual activity: Not on file   Other Topics Concern    Not on file   Social History Narrative    Not on file     Social Determinants of Health     Financial Resource Strain: Not on file   Food Insecurity: Not on file   Transportation Needs: Not on file   Physical Activity: Not on file   Stress: Not on file   Social Connections: Not on file   Intimate Partner Violence: Not At Risk (5/25/2024)    Humiliation, Afraid, Rape, and Kick questionnaire     Fear of Current or Ex-Partner: No     Emotionally Abused: No     Physically  Abused: No     Sexually Abused: No   Housing Stability: Not on file       ROS: In review of the following systems: Constitutional, Eyes, ENT, Cardiovascular,Respiratory, GI, , Musculoskeletal, Skin, Neuro, Psych, Hematologic, Endocrine, Allergic; no pertinent findings were found related to the chief complaint and orthopedic injury     BP (!) 140/64   Pulse 82   Temp 36.5 °C (97.7 °F) (Temporal)   Resp 16   Ht 1.829 m (6')   Wt 113 kg (249 lb 1.9 oz)   SpO2 95%     Physical Exam:  General: Well nourished, well developed, age appropriate appearance   HEENT: Normocephalic, atraumatic  Psych: Normal mood and affect  Neck: Supple, no pain to motion  Chest/Pulmonary: breathing unlabored, no audible wheezing  Cardio: Regular heart rate and rhythm  Neuro: Sensation grossly intact to BUE and BLE, moving all four extremities  Skin: Intact with no full thickness abrasions or lacerations  MSK: There remains an open draining wound at the right sternoclavicular joint with packing in place.  There is surrounding erythema.  There is a developed sinus tract.    Imaging and labs: CT scan of the thorax showed a draining sinus tract extending to the right sternoclavicular joint.  There is thickening of the capsule likely suggestive of chronic infection.  He also has some changes to the medial clavicle consistent with osteomyelitic changes.    Recent Labs     05/24/24  1443 05/25/24  0459   WBC 4.6* 4.1*   RBC 3.90* 3.62*   HEMOGLOBIN 10.6* 9.8*   HEMATOCRIT 33.0* 31.7*   MCV 84.6 87.6   MCH 27.2 27.1   RDW 41.1 42.8   PLATELETCT 394 387   MPV 9.1 9.2   NEUTSPOLYS 48.20  --    LYMPHOCYTES 32.40  --    MONOCYTES 12.30  --    EOSINOPHILS 6.00  --    BASOPHILS 0.90  --        Assessment:   1. History of osteomyelitis        2. Fall, initial encounter        Right clavicle osteomyelitis, right supraclavicular septic arthritis    I discussed the diagnosis and findings with the patient at length.  I reviewed possible non operative and  operative interventions and the risks and benefits of each of these.  I recommended partial saucerization of the right clavicle as well as arthrotomy of the right sternoclavicular joint to treat his current infection.  He will need repeat cultures and likely continue antibiotics as he did not heal with the initial course.  He had a chance to ask questions and all of these were answered to his satisfaction.        Plan:  N.p.o.  Right clavicle I&D and sternoclavicular joint arthrotomy  Tissue cultures  Antibiotics  Wound care  Discharge planning

## 2024-05-26 NOTE — OP REPORT
DATE OF OPERATION: 5/26/2024     PREOPERATIVE DIAGNOSIS: Chronic right sternoclavicular septic arthritis, chronic right medial clavicle osteomyelitis    POSTOPERATIVE DIAGNOSIS: Same    PROCEDURE PERFORMED:  1.  Arthrotomy and drainage of right sternoclavicular joint  2.  Partial saucerization of the medial right clavicle    SURGEON: Kael Donato M.D.     ASSISTANT: None    ANESTHESIA: General    SPECIMEN: Tissue and bone culture    ESTIMATED BLOOD LOSS: 15 mL    IMPLANTS: None      INDICATIONS: The patient is a 59 y.o. male who presented with history of about 6 weeks of infection to the right sternoclavicular joint.  Previous incision and biopsy and/or drainage was done by separate surgeon.  He has had a chronic wound that is now developed over this area.  I recommended excision of the sinus tract as well as drainage of the sternoclavicular joint and debridement of the osteomyelitic portions of the clavicle.  I discussed the risks and benefits of the procedure which include but are not limited to risks of infection, wound healing complication, neurovascular injury, pain, risk of sternoclavicular instability, and the medical risks of anesthesia including MI, stroke, and death.  Alternatives to surgery were also discussed, including non-operative management, which I did not recommend.  The patient was in agreement with the plan to proceed, and the informed consent was signed and documented.  I met with the patient pre-operatively and marked the operative extremity with their agreement.  We proceeded to the operating room.     DESCRIPTION OF PROCEDURE:  Patient was seen in the preoperative holding area on the day of surgery. The operative site was marked with my initials.  he was taken to the operating room and placed supine on the operative table.  Anesthesia was induced.  The operative extremity was prepped and draped in the normal sterile fashion.  Operative pause was conducted and the correct patient, site,  side, procedure, and surgeon's initials on extremity were identified.  The previous draining sinus tract was ellipsed out.  This was carried down as a full-thickness excision down to the sternoclavicular joint.  The joint was entered and a portion of the anterior capsule was excised to allow for drainage.  A sample of the deep tissue as well as a portion of the deep meniscal tissue was sent for culture.  A rongeur was used to debride any loose nonviable or infected tissue.  Next the incision was continued more medially to allow further exposure of the medial clavicle.  The cartilaginous head of the clavicle appeared to have been deteriorated from the chronic infection.  A rongeur was used to remove a portion of the head of the clavicle as well as unroofing the cortex from the medial clavicle to ensure that all osteomyelitic infection of the clavicle could be adequately drained.  Any soft obvious osteomyelitic bone near the head of the clavicle was excised with a rongeur.  At this point the wounds were thoroughly irrigated.  These were then closed in layered fashion.  This was done with 2-0 Monocryl for the deep and subcutaneous tissues as well as 2-0 nylon for the skin.  A Penrose drain was left extending down to the sternoclavicular joint.  This will be removed with first dressing change.  This point the patient awoken the operating room and was taken to PACU in stable condition.    POSTOPERATIVE PLAN: Activity as tolerated to the right upper extremity.  Dressing change on postop day 1 or 2 with removal of the Penrose drain at same time.  Can continue gauze and tape dressing changes.  Sutures out in clinic between 2 and 3 weeks.      ____________________________________   Kael Donato M.D.   DD: 5/26/2024  9:27 AM

## 2024-05-26 NOTE — ANESTHESIA TIME REPORT
Anesthesia Start and Stop Event Times       Date Time Event    5/26/2024 0824 Ready for Procedure     0838 Anesthesia Start     0927 Anesthesia Stop          Responsible Staff  05/26/24      Name Role Begin End    Venkata Angulo M.D. Anesth 0838 0927          Overtime Reason:  no overtime (within assigned shift)    Comments:

## 2024-05-26 NOTE — PROGRESS NOTES
4 Eyes Skin Assessment Completed by AWILDA Nick and AWILDA Bang.    Head WDL  Ears Redness and Blanching  Nose WDL  Mouth WDL  Neck WDL  Breast/Chest Incision - shadowing to top, otherwise c/d/i  Shoulder Blades WDL  Spine WDL  (R) Arm/Elbow/Hand WDL  (L) Arm/Elbow/Hand Abrasion dsg c/d/i  Abdomen WDL  Groin WDL  Scrotum/Coccyx/Buttocks Scab  (R) Leg WDL  (L) Leg WDL  (R) Heel/Foot/Toe Ulcer(s) dsg c/d/I, to be changed 5/27  (L) Heel/Foot/Toe WDL          Devices In Places Blood Pressure Cuff, Pulse Ox, SCD's, and Nasal Cannula      Interventions In Place Gray Ear Foams and Pillows    Possible Skin Injury No    Pictures Uploaded Into Epic Yes  Wound Consult Placed N/A  RN Wound Prevention Protocol Ordered No       Wound care already following, wound protocols in place.

## 2024-05-26 NOTE — CARE PLAN
Problem: Hyperinflation  Goal: Prevent or improve atelectasis  Description: Target End Date:  3 to 4 days    1. Instruct incentive spirometry usage  2.  Perform hyperinflation therapy as indicated  5/25/2024 1704 by Samantha York RRT  Outcome: Progressing  5/25/2024 1354 by Samantha York, RRT  Outcome: Progressing   PEP 2000

## 2024-05-26 NOTE — DIETARY
Nutrition services: Day 2 of admit.  Chuy Moody is a 59 y.o. male with admitting DX of Infected wound and cellulitis.     Consult received for Failure to Thrive.     Pt seen at bedside post-op arthrotomy and drainage of R sternoclavicular joint and partial saucerization of the medial R clavicle done this am. Discussed with pt that he weighs 249 lbs this admit taken via stand up scale and pt reports that is his normal weight, denies decreased PO intake and reports he eats 3 god-sized meals per day. Pt is well nourished. Admit screen with no weight loss or decreased PO intake. No pressure-related wounds, generalized to +1 mild edema to all extremities.    No signs of Failure to Thrive per discussion and chart review. RD will monitor per department policy.

## 2024-05-26 NOTE — PROGRESS NOTES
Hospital Medicine Daily Progress Note    Date of Service  5/26/2024    Chief Complaint  Chuy Moody is a 59 y.o. male admitted 5/24/2024 with drainage from right proximal clavicle.    Hospital Course  Chuy Moody is a 59 y.o. male with history of DM and recent hospitalization for osteomyelitis of the right clavicle. He had been hospitalized at [facility] for several weeks during which time he underwent surgery of the infected clavicle. That hospital stay was complicated by a DVT of the upper extremity and he was therefore started  He was then transferred to an LTAC and was reportedly discharged to home with a picc line and iv cefazolin last week.  Several days ago, he noticed some discharge from the surgical site and went an urgent care for evaluation and they referred him to the ER. Therefore he presented here on 5/24/2024. He also suffered a mechanical ground level fall at the urgent care, tripping over carpet that was sticking up, he has a mild abrasion on his left elbow and both knees from this, he denies any acute pain, no loc, no head injury and a CT of the head was checked in the ER as he is on blood thinner and no acute findings. Blood cultures were drawn and I consulted Dr. Lawton of ID.     Interval Problem Update  5/25: Patient states that he had a large amount of drainage from his right clavicle after 6 weeks of IV antibiotics.  Chest x-ray on admission is not adequate to evaluate for abscess.  I did order CT chest with IV contrast that showed significant fluid at the sternal clavicular joint suspicious for abscess and/or septic arthritis.  Several lymph nodes are noted and fluid below the sternoclavicular joint.  Discussed with orthopedics Dr. Donato who reviewed CT chest and agreed that patient needed to go to the OR in a.m. for washout, new or cultures.  Bilateral pleural effusions also noted.  Patient has been on Lasix in the past.  For now given IV contrast will be n.p.o. and on IV  vancomycin, hold on IV Lasix for now.  Holding Eliquis dose tonight n.p.o. at midnight coags in a.m. repeat CBC BMP.  5/26:  ID consult appreciated with prior MSSA infection, now with corynebacterium striatum growing from wound, pending deep OR cultures and bone culture. Underwent OR I&D of sternoclavicular joint and clavicle bone this a.m  tolerated well.  Change from ancef and vancomycin IV to daptomycin IV daily x 6 weeks.  Replace K po.     I have discussed this patient's plan of care and discharge plan at IDT rounds today with Case Management, Nursing, Nursing leadership, and other members of the IDT team.    Consultants/Specialty  infectious disease and orthopedics    Code Status  Full Code    Disposition  The patient is not medically cleared for discharge to home or a post-acute facility.  Anticipate discharge to: home with organized home healthcare and close outpatient follow-up    I have placed the appropriate orders for post-discharge needs.    Review of Systems  Review of Systems   Constitutional:  Negative for chills, diaphoresis, fever and malaise/fatigue.   HENT:  Negative for congestion and sore throat.    Eyes:  Negative for pain and discharge.   Respiratory:  Positive for shortness of breath. Negative for cough, hemoptysis, sputum production and wheezing.    Cardiovascular:  Positive for leg swelling. Negative for chest pain, palpitations and claudication.   Gastrointestinal:  Negative for abdominal pain, constipation, diarrhea, melena, nausea and vomiting.   Genitourinary:  Negative for dysuria, frequency and urgency.   Musculoskeletal:  Positive for joint pain (Right clavicular sternal joint). Negative for back pain, myalgias and neck pain.   Skin:  Negative for itching and rash.   Neurological:  Negative for dizziness, sensory change, speech change, focal weakness, loss of consciousness, weakness and headaches.   Endo/Heme/Allergies:  Does not bruise/bleed easily.   Psychiatric/Behavioral:   Negative for depression, substance abuse and suicidal ideas.         Physical Exam  Temp:  [36.1 °C (97 °F)-36.9 °C (98.4 °F)] 36.7 °C (98 °F)  Pulse:  [74-86] 75  Resp:  [12-20] 16  BP: (111-181)/(61-94) 127/91  SpO2:  [94 %-98 %] 97 %    Physical Exam  Constitutional:       General: He is not in acute distress.     Appearance: Normal appearance. He is not diaphoretic.   HENT:      Head: Normocephalic and atraumatic.      Mouth/Throat:      Mouth: Mucous membranes are moist.      Pharynx: No oropharyngeal exudate.   Eyes:      General: No scleral icterus.        Right eye: No discharge.         Left eye: No discharge.      Conjunctiva/sclera: Conjunctivae normal.      Pupils: Pupils are equal, round, and reactive to light.   Neck:      Thyroid: No thyromegaly.      Vascular: No JVD.      Trachea: No tracheal deviation.   Cardiovascular:      Rate and Rhythm: Normal rate and regular rhythm.      Pulses: Normal pulses.      Heart sounds: Normal heart sounds. No murmur heard.     No friction rub. No gallop.   Pulmonary:      Effort: Pulmonary effort is normal. No respiratory distress.      Breath sounds: Normal breath sounds. No wheezing or rales.      Comments: Right proximal clavicle with 2 cm x 1 cm open skin lesion noted with mild surrounding erythema.  He is tender to touch at bandage site.  Chest:      Chest wall: No tenderness.   Abdominal:      General: Abdomen is flat. Bowel sounds are normal. There is no distension.      Palpations: Abdomen is soft. There is no mass.      Tenderness: There is no abdominal tenderness. There is no guarding or rebound.   Musculoskeletal:         General: No tenderness. Normal range of motion.      Cervical back: Normal range of motion and neck supple.      Right lower leg: Edema present.      Left lower leg: Edema present.   Lymphadenopathy:      Cervical: No cervical adenopathy.   Skin:     General: Skin is warm and dry.      Findings: No erythema or rash.   Neurological:       General: No focal deficit present.      Mental Status: He is alert and oriented to person, place, and time.      Cranial Nerves: No cranial nerve deficit.      Motor: No abnormal muscle tone.   Psychiatric:         Mood and Affect: Mood normal.         Behavior: Behavior normal.         Thought Content: Thought content normal.         Judgment: Judgment normal.         Fluids    Intake/Output Summary (Last 24 hours) at 5/26/2024 1551  Last data filed at 5/26/2024 1300  Gross per 24 hour   Intake 1156.07 ml   Output 10 ml   Net 1146.07 ml       Laboratory  Recent Labs     05/24/24  1443 05/25/24  0459 05/26/24  0255   WBC 4.6* 4.1* 3.6*   RBC 3.90* 3.62* 3.56*   HEMOGLOBIN 10.6* 9.8* 9.7*   HEMATOCRIT 33.0* 31.7* 31.3*   MCV 84.6 87.6 87.9   MCH 27.2 27.1 27.2   MCHC 32.1* 30.9* 31.0*   RDW 41.1 42.8 43.1   PLATELETCT 394 387 396   MPV 9.1 9.2 9.5     Recent Labs     05/24/24  1443 05/25/24  0459 05/26/24  0255   SODIUM 140 141 142   POTASSIUM 3.7 3.4* 3.2*   CHLORIDE 103 105 105   CO2 23 24 24   GLUCOSE 107* 101* 119*   BUN 10 7* 7*   CREATININE 0.53 0.46* 0.53   CALCIUM 9.3 8.6 8.8     Recent Labs     05/24/24  1443 05/26/24  0255   APTT 30.9 32.0   INR 1.13 1.05               Imaging  EC-ECHOCARDIOGRAM COMPLETE W/ CONT   Final Result      CT-CHEST (THORAX) WITH   Final Result      1.  There are changes involving the medial head of the right clavicle and sternoclavicular joint consistent with osteomyelitis/septic arthritis. There is a small amount of surrounding fluid with ill-defined phlegmonous change which could be a small    abscess.   2.  There are nonspecific superior mediastinal nodes, probably reactive inflammatory nodes.   3.  There is a moderate dependent right and a small dependent left pleural effusion.   4.  Bibasilar airspace disease is most consistent with atelectasis. No pneumonia or pneumothorax.      Fleischner Society pulmonary nodule recommendations:   Not Applicable         DX-CLAVICLE RIGHT    Final Result      No fracture detected.      CT-HEAD W/O   Final Result      Head CT without contrast within normal limits. No evidence of acute cerebral infarction, hemorrhage or mass lesion.         DX-FOOT-COMPLETE 3+ RIGHT   Final Result      1.  No acute osseous abnormality. If symptoms persist, follow-up radiographs can be obtained in 7-10 days.   2.  Plantar calcaneal enthesophyte.      DX-CHEST-PORTABLE (1 VIEW)   Final Result      1. Right lung basilar opacity with moderate right pleural effusion, concerning for pneumonia or atelectasis.   2. Appropriate position of the left upper extremity PIC catheter.   3. Normal cardiomediastinal silhouette size.           Assessment/Plan  * Septic arthritis involving sternum (HCC)- (present on admission)  Assessment & Plan    No evidence of sirs or sepsis  B/c drawn  Picc in place and c/d  Culture of wound pending.  Martir HOPKINS consulted.  Continue ancef for now, since this occurred on abx will add vanc until further instructions from martir HOPKINS  5/25:  CT chest with contrast concerning for abscess and septic arthritis at right clavicular sternal joint. LNS noted and fluid collection below joint noted. Dr. Donato reviewed, needs OR I&D in a.m.  patient and friend aware of findings.    Will need OR cultures.  5/26:  ID consult appreciated with prior MSSA infection, now with corynebacterium striatum growing from wound, pending deep OR cultures and bone culture. Underwent OR I&D of sternoclavicular joint and clavicle bone this a.m  tolerated well.  Change from ancef and vancomycin IV to daptomycin IV daily x 6 weeks.    CPK weekly.      Acute osteomyelitis of clavicle, right (HCC)- (present on admission)  Assessment & Plan  5/26:  s/p OR bone biopsy and deep cultures, s/p OR wash out.  Corynebacterium striatum, changed ancef and vancomycin to daptomycin IV daily.  ID and ortho appreciated.    Pleural effusion on right- (present on admission)  Assessment & Plan  Will  likely need IR thoracentesis.   Plan to start lasix post op.  Check echo.    Chronic deep vein thrombosis (DVT) of right upper extremity (HCC)- (present on admission)  Assessment & Plan  Sub acute  Continue eliquis    Leukopenia- (present on admission)  Assessment & Plan  Mild  following    Diabetic ulcer of foot, limited to breakdown of skin (HCC)- (present on admission)  Assessment & Plan  R foot  Present on admission  Wound care  following    Hypoxia- (present on admission)  Assessment & Plan  Subacute  Query jasmeet due to body habitus, sleep study recommended as outpatient   IS  RT  Following  Stable on recent baseline of 2L    DM (diabetes mellitus) (HCC)- (present on admission)  Assessment & Plan  No dka  Diabetic diet  Continue long acting insulin and SSI  Hypoglycemic protocol  following         VTE prophylaxis:    therapeutic anticoagulation with eliquis 5 mg BID      I have performed a physical exam and reviewed and updated ROS and Plan today (5/26/2024). In review of yesterday's note (5/25/2024), there are no changes except as documented above.

## 2024-05-27 LAB
ALBUMIN SERPL BCP-MCNC: 3.6 G/DL (ref 3.2–4.9)
ALBUMIN/GLOB SERPL: 1.4 G/DL
ALP SERPL-CCNC: 69 U/L (ref 30–99)
ALT SERPL-CCNC: 7 U/L (ref 2–50)
ANION GAP SERPL CALC-SCNC: 14 MMOL/L (ref 7–16)
AST SERPL-CCNC: 13 U/L (ref 12–45)
BACTERIA SPEC ANAEROBE CULT: NORMAL
BASOPHILS # BLD AUTO: 0.3 % (ref 0–1.8)
BASOPHILS # BLD: 0.01 K/UL (ref 0–0.12)
BILIRUB SERPL-MCNC: 0.3 MG/DL (ref 0.1–1.5)
BUN SERPL-MCNC: 13 MG/DL (ref 8–22)
CALCIUM ALBUM COR SERPL-MCNC: 8.8 MG/DL (ref 8.5–10.5)
CALCIUM SERPL-MCNC: 8.5 MG/DL (ref 8.5–10.5)
CHLORIDE SERPL-SCNC: 102 MMOL/L (ref 96–112)
CK SERPL-CCNC: 69 U/L (ref 0–154)
CO2 SERPL-SCNC: 21 MMOL/L (ref 20–33)
CREAT SERPL-MCNC: 0.55 MG/DL (ref 0.5–1.4)
EOSINOPHIL # BLD AUTO: 0.02 K/UL (ref 0–0.51)
EOSINOPHIL NFR BLD: 0.5 % (ref 0–6.9)
ERYTHROCYTE [DISTWIDTH] IN BLOOD BY AUTOMATED COUNT: 41.6 FL (ref 35.9–50)
EST. AVERAGE GLUCOSE BLD GHB EST-MCNC: 146 MG/DL
GFR SERPLBLD CREATININE-BSD FMLA CKD-EPI: 113 ML/MIN/1.73 M 2
GLOBULIN SER CALC-MCNC: 2.5 G/DL (ref 1.9–3.5)
GLUCOSE BLD STRIP.AUTO-MCNC: 178 MG/DL (ref 65–99)
GLUCOSE BLD STRIP.AUTO-MCNC: 208 MG/DL (ref 65–99)
GLUCOSE BLD STRIP.AUTO-MCNC: 216 MG/DL (ref 65–99)
GLUCOSE BLD STRIP.AUTO-MCNC: 223 MG/DL (ref 65–99)
GLUCOSE SERPL-MCNC: 240 MG/DL (ref 65–99)
HBA1C MFR BLD: 6.7 % (ref 4–5.6)
HCT VFR BLD AUTO: 30.5 % (ref 42–52)
HGB BLD-MCNC: 10.1 G/DL (ref 14–18)
IMM GRANULOCYTES # BLD AUTO: 0.01 K/UL (ref 0–0.11)
IMM GRANULOCYTES NFR BLD AUTO: 0.3 % (ref 0–0.9)
LYMPHOCYTES # BLD AUTO: 1.22 K/UL (ref 1–4.8)
LYMPHOCYTES NFR BLD: 31 % (ref 22–41)
MCH RBC QN AUTO: 28.1 PG (ref 27–33)
MCHC RBC AUTO-ENTMCNC: 33.1 G/DL (ref 32.3–36.5)
MCV RBC AUTO: 85 FL (ref 81.4–97.8)
MONOCYTES # BLD AUTO: 0.52 K/UL (ref 0–0.85)
MONOCYTES NFR BLD AUTO: 13.2 % (ref 0–13.4)
MYCOBACTERIUM SPEC CULT: NORMAL
NEUTROPHILS # BLD AUTO: 2.16 K/UL (ref 1.82–7.42)
NEUTROPHILS NFR BLD: 54.7 % (ref 44–72)
NRBC # BLD AUTO: 0 K/UL
NRBC BLD-RTO: 0 /100 WBC (ref 0–0.2)
PLATELET # BLD AUTO: 412 K/UL (ref 164–446)
PMV BLD AUTO: 9 FL (ref 9–12.9)
POTASSIUM SERPL-SCNC: 3.9 MMOL/L (ref 3.6–5.5)
PROT SERPL-MCNC: 6.1 G/DL (ref 6–8.2)
RBC # BLD AUTO: 3.59 M/UL (ref 4.7–6.1)
RHODAMINE-AURAMINE STN SPEC: NORMAL
RHODAMINE-AURAMINE STN SPEC: NORMAL
SIGNIFICANT IND 70042: NORMAL
SITE SITE: NORMAL
SODIUM SERPL-SCNC: 137 MMOL/L (ref 135–145)
SOURCE SOURCE: NORMAL
WBC # BLD AUTO: 3.9 K/UL (ref 4.8–10.8)

## 2024-05-27 PROCEDURE — 770006 HCHG ROOM/CARE - MED/SURG/GYN SEMI*

## 2024-05-27 PROCEDURE — 82962 GLUCOSE BLOOD TEST: CPT

## 2024-05-27 PROCEDURE — 700102 HCHG RX REV CODE 250 W/ 637 OVERRIDE(OP): Performed by: HOSPITALIST

## 2024-05-27 PROCEDURE — 80053 COMPREHEN METABOLIC PANEL: CPT

## 2024-05-27 PROCEDURE — 83036 HEMOGLOBIN GLYCOSYLATED A1C: CPT

## 2024-05-27 PROCEDURE — 82550 ASSAY OF CK (CPK): CPT

## 2024-05-27 PROCEDURE — 700111 HCHG RX REV CODE 636 W/ 250 OVERRIDE (IP): Mod: JZ | Performed by: HOSPITALIST

## 2024-05-27 PROCEDURE — 97535 SELF CARE MNGMENT TRAINING: CPT

## 2024-05-27 PROCEDURE — 99233 SBSQ HOSP IP/OBS HIGH 50: CPT | Performed by: HOSPITALIST

## 2024-05-27 PROCEDURE — A9270 NON-COVERED ITEM OR SERVICE: HCPCS | Performed by: HOSPITALIST

## 2024-05-27 PROCEDURE — 700105 HCHG RX REV CODE 258: Performed by: HOSPITALIST

## 2024-05-27 PROCEDURE — 85025 COMPLETE CBC W/AUTO DIFF WBC: CPT

## 2024-05-27 PROCEDURE — 94669 MECHANICAL CHEST WALL OSCILL: CPT

## 2024-05-27 RX ORDER — FUROSEMIDE 10 MG/ML
20 INJECTION INTRAMUSCULAR; INTRAVENOUS
Status: COMPLETED | OUTPATIENT
Start: 2024-05-27 | End: 2024-05-29

## 2024-05-27 RX ORDER — POTASSIUM CHLORIDE 20 MEQ/1
40 TABLET, EXTENDED RELEASE ORAL DAILY
Status: COMPLETED | OUTPATIENT
Start: 2024-05-27 | End: 2024-05-29

## 2024-05-27 RX ADMIN — POTASSIUM CHLORIDE 40 MEQ: 1500 TABLET, EXTENDED RELEASE ORAL at 04:47

## 2024-05-27 RX ADMIN — FUROSEMIDE 20 MG: 10 INJECTION INTRAMUSCULAR; INTRAVENOUS at 18:18

## 2024-05-27 RX ADMIN — LISINOPRIL 40 MG: 20 TABLET ORAL at 06:31

## 2024-05-27 RX ADMIN — CARVEDILOL 25 MG: 25 TABLET, FILM COATED ORAL at 18:16

## 2024-05-27 RX ADMIN — APIXABAN 5 MG: 5 TABLET, FILM COATED ORAL at 04:49

## 2024-05-27 RX ADMIN — POTASSIUM CHLORIDE 40 MEQ: 1500 TABLET, EXTENDED RELEASE ORAL at 18:16

## 2024-05-27 RX ADMIN — INSULIN HUMAN 2 UNITS: 100 INJECTION, SOLUTION PARENTERAL at 08:31

## 2024-05-27 RX ADMIN — DAPTOMYCIN 700 MG: 500 INJECTION, POWDER, LYOPHILIZED, FOR SOLUTION INTRAVENOUS at 18:26

## 2024-05-27 RX ADMIN — AMLODIPINE BESYLATE 5 MG: 5 TABLET ORAL at 04:46

## 2024-05-27 RX ADMIN — CARVEDILOL 25 MG: 25 TABLET, FILM COATED ORAL at 08:37

## 2024-05-27 RX ADMIN — AMLODIPINE BESYLATE 5 MG: 5 TABLET ORAL at 18:16

## 2024-05-27 RX ADMIN — APIXABAN 5 MG: 5 TABLET, FILM COATED ORAL at 22:27

## 2024-05-27 ASSESSMENT — ENCOUNTER SYMPTOMS
COUGH: 0
SPEECH CHANGE: 0
SHORTNESS OF BREATH: 0
FOCAL WEAKNESS: 0
WHEEZING: 0
CHILLS: 0
NAUSEA: 0
MYALGIAS: 0
HEMOPTYSIS: 0
SHORTNESS OF BREATH: 1
CLAUDICATION: 0
PALPITATIONS: 0
WEAKNESS: 0
DEPRESSION: 0
CONSTIPATION: 0
EYE DISCHARGE: 0
SENSORY CHANGE: 0
SPUTUM PRODUCTION: 0
BACK PAIN: 0
VOMITING: 0
DIARRHEA: 0
BRUISES/BLEEDS EASILY: 0
NECK PAIN: 0
FEVER: 0
DIZZINESS: 0
DIAPHORESIS: 0
LOSS OF CONSCIOUSNESS: 0
EYE PAIN: 0
ABDOMINAL PAIN: 0
SORE THROAT: 0
HEADACHES: 0

## 2024-05-27 ASSESSMENT — LIFESTYLE VARIABLES: SUBSTANCE_ABUSE: 0

## 2024-05-27 ASSESSMENT — PAIN DESCRIPTION - PAIN TYPE
TYPE: ACUTE PAIN

## 2024-05-27 NOTE — CONSULTS
DATE OF SERVICE:  05/26/2024     Consult requested by Marissa Butt M.D.     REASON FOR CONSULTATION:    Please see full consultation from this date in chart.        ______________________________  MD RODERCIK HARRISON/MICAH/BEL    DD:  05/26/2024 13:48  DT:  05/26/2024 13:54    Job#:  [id number]    CC:Kael Donato MD

## 2024-05-27 NOTE — CARE PLAN
The patient is Stable - Low risk of patient condition declining or worsening    Shift Goals  Clinical Goals: Pt will maintain hemodynamic stability throughout shift  Patient Goals: Rest  Family Goals: updated on poc    Progress made toward(s) clinical / shift goals:      Pt tolerating and managing pain using prescribed medications and non-pharmacological interventions    Pt's SBP has been below 160. Pt has PRN medication for elevated SBP over 160 if needed.     Patient is not progressing towards the following goals:

## 2024-05-27 NOTE — CARE PLAN
Problem: Hyperinflation  Goal: Prevent or improve atelectasis  Description: Target End Date:  3 to 4 days    1. Instruct incentive spirometry usage  2.  Perform hyperinflation therapy as indicated  Outcome: Progressing     Pt receiving PEP QID last IS value 2000

## 2024-05-27 NOTE — FACE TO FACE
Face to Face Supporting Documentation - Home Health    The encounter with this patient was in whole or in part the primary reason for home health admission.    Date of encounter:   Patient:                    MRN:                       YOB: 2026  Chuy Moody  [id number]  1965     Home health to see patient for:  Skilled Nursing care for assessment, interventions & education, Wound Care, and Comment: PICC line dressing changes weekly.    Skilled need for:  Surgical Aftercare post op OM and septic arthritis right sternoclavicular joint.    Skilled nursing interventions to include:  Home IV infusion therapy    Homebound status evidenced by:  Need the aid of supportive devices such as crutches, canes, wheelchairs or walkers. Leaving home requires a considerable and taxing effort. There is a normal inability to leave the home.    Community Physician to provide follow up care: Brisa Cho M.D.     Optional Interventions? No      I certify the face to face encounter for this home health care referral meets the CMS requirements and the encounter/clinical assessment with the patient was, in whole, or in part, for the medical condition(s) listed above, which is the primary reason for home health care. Based on my clinical findings: the service(s) are medically necessary, support the need for home health care, and the homebound criteria are met.  I certify that this patient has had a face to face encounter by myself.  Marissa Butt M.D. - NPI: [id number]

## 2024-05-27 NOTE — THERAPY
Occupational Therapy Contact Note    Patient Name: Chuy Moody  Age:  59 y.o., Sex:  male  Medical Record #: [id number]  Today's Date: 5/27/2024    OT consult received, spoke with patient at bedside states he's been able to complete all ADLs and functional mobility without assistancewhile admitted, states his right arm feels better than it did prior to surgery also able to demonstrate full AROM at shoulder without pain/weakness. Pt also states he has been working with therapy for quite some time, has no concerns for Dcing home from a therapy standpoint, educated patient to let MD know if anything changes with ability to complete ADLs or use RUE, pt stated he will. OT order will be complete at this time.       Sylvester Agosto OTD, OTR/L

## 2024-05-27 NOTE — CARE PLAN
The patient is Stable - Low risk of patient condition declining or worsening    Shift Goals  Clinical Goals: pt will maintain hemodynamic stability throughout shift  Patient Goals: rest  Family Goals: maria fernanda    Progress made toward(s) clinical / shift goals:    Problem: Knowledge Deficit - Standard  Goal: Patient and family/care givers will demonstrate understanding of plan of care, disease process/condition, diagnostic tests and medications  Outcome: Progressing     Problem: Fall Risk  Goal: Patient will remain free from falls  Outcome: Progressing     Problem: Pain - Standard  Goal: Alleviation of pain or a reduction in pain to the patient’s comfort goal  Outcome: Progressing     Problem: Hemodynamics  Goal: Patient's hemodynamics, fluid balance and neurologic status will be stable or improve  Outcome: Progressing       Patient is not progressing towards the following goals:

## 2024-05-27 NOTE — THERAPY
05/27/24 0916   Interdisciplinary Plan of Care Collaboration   Collaboration Comments Met w/ pt at bedside.  Educated pt regarding PT services in the acute care setting.  Pt reports up self to the bathroom and in his room.  Educated pt regarding no restricitons w/ regard to his RUE.  He is able to mobilize his shoulder to approximately 90 degrees of flexion and abduction.  Pt politely declines PT services at this time.

## 2024-05-27 NOTE — PROGRESS NOTES
Assumed care of pt from NOC RN. Pt needs met at this time. Call light and personal belongings in reach. Bed locked in lowest position.

## 2024-05-27 NOTE — PROGRESS NOTES
Bedside report received from AWILDA Nick. Patient resting in bed. Call bell within reach, bed alarm on and in place. No further needs at this time.

## 2024-05-27 NOTE — PROGRESS NOTES
Infectious Disease Progress Note    Author: Bharathi Grady M.D. Date & Time created: 5/27/2024  2:01 PM    Interval History:  Daptomycin  5/26 - Day #1    Prior Antibiotics:  Cefazolin 5/24-26  Vancomycin 5/24/2026 5/27: Patient appears to be doing well today.  His spirits are up and he is anxious to get out of the hospital.  He is growing corynebacterium stratum from his wound.  I have switched him to daptomycin which has activity against corynebacterium stratum.  I will check with his insurance company tomorrow and see if we can have him approved for outpatient daptomycin therapy.  If we can get him approved hopefully we can get him out of the hospital by Wednesday.  We will continue to follow the patient's cultures to make sure nothing else grows late.    Labs Reviewed, Medications Reviewed, Radiology Reviewed, Wound Reviewed, Fluids Reviewed, and GI Nutrition Reviewed    Review of Systems:  Review of Systems   Constitutional:  Negative for chills and fever.   Respiratory:  Negative for cough and shortness of breath.    Cardiovascular:  Negative for chest pain.   Gastrointestinal:  Negative for abdominal pain, constipation, diarrhea, nausea and vomiting.   Genitourinary:  Negative for dysuria, frequency and urgency.   Musculoskeletal:  Positive for joint pain (he states that his right sternoclavicular joint feels good at this time.  He states it is the best it is felt in a long time.).   Skin:  Negative for itching.       Physical Exam:  Physical Exam  Vitals and nursing note reviewed.   Constitutional:       Appearance: He is obese.   HENT:      Head: Normocephalic and atraumatic.      Mouth/Throat:      Mouth: Mucous membranes are moist.   Eyes:      Conjunctiva/sclera: Conjunctivae normal.   Cardiovascular:      Rate and Rhythm: Normal rate and regular rhythm.      Heart sounds: No murmur heard.  Pulmonary:      Effort: Pulmonary effort is normal. No respiratory distress.      Breath sounds: Normal breath  sounds. No wheezing or rhonchi.   Abdominal:      General: Abdomen is flat. There is no distension.      Palpations: Abdomen is soft.      Tenderness: There is no guarding.   Musculoskeletal:      Comments: He does have a small amount of drainage on his dressing from surgery yesterday.  The drainage is not gone beyond the marked boundaries.  We will look at his wound tomorrow.   Skin:     General: Skin is warm and dry.   Neurological:      General: No focal deficit present.      Mental Status: He is alert and oriented to person, place, and time.   Psychiatric:         Mood and Affect: Mood normal.         Behavior: Behavior normal.         Labs:  Recent Results (from the past 24 hour(s))   POCT glucose device results    Collection Time: 05/26/24  5:44 PM   Result Value Ref Range    POC Glucose, Blood 244 (H) 65 - 99 mg/dL   POCT glucose device results    Collection Time: 05/26/24  8:50 PM   Result Value Ref Range    POC Glucose, Blood 264 (H) 65 - 99 mg/dL   CBC WITH DIFFERENTIAL    Collection Time: 05/27/24  3:41 AM   Result Value Ref Range    WBC 3.9 (L) 4.8 - 10.8 K/uL    RBC 3.59 (L) 4.70 - 6.10 M/uL    Hemoglobin 10.1 (L) 14.0 - 18.0 g/dL    Hematocrit 30.5 (L) 42.0 - 52.0 %    MCV 85.0 81.4 - 97.8 fL    MCH 28.1 27.0 - 33.0 pg    MCHC 33.1 32.3 - 36.5 g/dL    RDW 41.6 35.9 - 50.0 fL    Platelet Count 412 164 - 446 K/uL    MPV 9.0 9.0 - 12.9 fL    Neutrophils-Polys 54.70 44.00 - 72.00 %    Lymphocytes 31.00 22.00 - 41.00 %    Monocytes 13.20 0.00 - 13.40 %    Eosinophils 0.50 0.00 - 6.90 %    Basophils 0.30 0.00 - 1.80 %    Immature Granulocytes 0.30 0.00 - 0.90 %    Nucleated RBC 0.00 0.00 - 0.20 /100 WBC    Neutrophils (Absolute) 2.16 1.82 - 7.42 K/uL    Lymphs (Absolute) 1.22 1.00 - 4.80 K/uL    Monos (Absolute) 0.52 0.00 - 0.85 K/uL    Eos (Absolute) 0.02 0.00 - 0.51 K/uL    Baso (Absolute) 0.01 0.00 - 0.12 K/uL    Immature Granulocytes (abs) 0.01 0.00 - 0.11 K/uL    NRBC (Absolute) 0.00 K/uL   CREATINE  KINASE    Collection Time: 05/27/24  3:41 AM   Result Value Ref Range    CPK Total 69 0 - 154 U/L   Comp Metabolic Panel    Collection Time: 05/27/24  3:41 AM   Result Value Ref Range    Sodium 137 135 - 145 mmol/L    Potassium 3.9 3.6 - 5.5 mmol/L    Chloride 102 96 - 112 mmol/L    Co2 21 20 - 33 mmol/L    Anion Gap 14.0 7.0 - 16.0    Glucose 240 (H) 65 - 99 mg/dL    Bun 13 8 - 22 mg/dL    Creatinine 0.55 0.50 - 1.40 mg/dL    Calcium 8.5 8.5 - 10.5 mg/dL    Correct Calcium 8.8 8.5 - 10.5 mg/dL    AST(SGOT) 13 12 - 45 U/L    ALT(SGPT) 7 2 - 50 U/L    Alkaline Phosphatase 69 30 - 99 U/L    Total Bilirubin 0.3 0.1 - 1.5 mg/dL    Albumin 3.6 3.2 - 4.9 g/dL    Total Protein 6.1 6.0 - 8.2 g/dL    Globulin 2.5 1.9 - 3.5 g/dL    A-G Ratio 1.4 g/dL   ESTIMATED GFR    Collection Time: 05/27/24  3:41 AM   Result Value Ref Range    GFR (CKD-EPI) 113 >60 mL/min/1.73 m 2   HEMOGLOBIN A1C    Collection Time: 05/27/24  3:41 AM   Result Value Ref Range    Glycohemoglobin 6.7 (H) 4.0 - 5.6 %    Est Avg Glucose 146 mg/dL   POCT glucose device results    Collection Time: 05/27/24  8:28 AM   Result Value Ref Range    POC Glucose, Blood 216 (H) 65 - 99 mg/dL   POCT glucose device results    Collection Time: 05/27/24  1:01 PM   Result Value Ref Range    POC Glucose, Blood 223 (H) 65 - 99 mg/dL     Results       Procedure Component Value Units Date/Time    CULTURE TISSUE W/ GRM STAIN [[id number]] Collected: 05/26/24 6662    Order Status: Completed Specimen: Tissue Updated: 05/27/24 1309     Significant Indicator NEG     Source TISS     Site RIGHT CLAVICLE     Culture Result No growth at 24 hours.     Gram Stain Result No organisms seen.    Narrative:      Surgery Specimen    Anaerobic Culture [[id number]] Collected: 05/26/24 0856    Order Status: Completed Specimen: Tissue Updated: 05/27/24 1309     Significant Indicator NEG     Source TISS     Site RIGHT CLAVICLE     Culture Result Culture in progress.    Narrative:      Surgery Specimen     Fungal Culture [[id number]] Collected: 05/26/24 0856    Order Status: No result Specimen: Tissue Updated: 05/27/24 1309     Significant Indicator NEG     Source TISS     Site RIGHT CLAVICLE     Culture Result -     Fungal Smear Results No fungal elements seen.    Narrative:      Surgery Specimen    AFB Culture [[id number]] Collected: 05/26/24 0856    Order Status: No result Specimen: Tissue Updated: 05/27/24 1309     Significant Indicator NEG     Source TISS     Site RIGHT CLAVICLE     Culture Result -     AFB Smear Results -    Narrative:      Surgery Specimen    GRAM STAIN [[id number]] Collected: 05/26/24 0856    Order Status: Completed Specimen: Tissue Updated: 05/26/24 2227     Significant Indicator .     Source TISS     Site RIGHT CLAVICLE     Gram Stain Result No organisms seen.    Narrative:      Surgery Specimen    Fungal Smear [[id number]] Collected: 05/26/24 0856    Order Status: Completed Specimen: Tissue Updated: 05/26/24 2227     Significant Indicator NEG     Source TISS     Site RIGHT CLAVICLE     Fungal Smear Results No fungal elements seen.    Narrative:      Surgery Specimen    CULTURE WOUND W/ GRAM STAIN [[id number]]  (Abnormal) Collected: 05/24/24 1533    Order Status: Completed Specimen: Wound from Chest Updated: 05/26/24 1127     Significant Indicator POS     Source WND     Site NECK     Culture Result -     Gram Stain Result Rare WBCs.  No organisms seen.       Culture Result Corynebacterium striatum group  Rare growth      Blood Culture - Draw one from central line and one from peripheral site [[id number]] Collected: 05/24/24 1443    Order Status: Completed Specimen: Blood from Peripheral Updated: 05/25/24 0733     Significant Indicator NEG     Source BLD     Site PERIPHERAL     Culture Result No Growth  Note: Blood cultures are incubated for 5 days and  are monitored continuously.Positive blood cultures  are called to the RN and reported as soon as  they are identified.      Narrative:      Left  Hand    Blood Culture - Draw one from central line and one from peripheral site [[id number]] Collected: 24 1445    Order Status: Completed Specimen: Blood from Line Updated: 24 0733     Significant Indicator NEG     Source BLD     Site Peripheral     Culture Result No Growth  Note: Blood cultures are incubated for 5 days and  are monitored continuously.Positive blood cultures  are called to the RN and reported as soon as  they are identified.      Narrative:      Left Hand    MRSA By PCR (Amp) [[id number]] Collected: 24    Order Status: Completed Specimen: Respirate from Nares Updated: 24 232     MRSA by PCR Negative    GRAM STAIN [[id number]] Collected: 24 1533    Order Status: Completed Specimen: Wound Updated: 24     Significant Indicator .     Source WND     Site NECK     Gram Stain Result Rare WBCs.  No organisms seen.      Urinalysis [[id number]]     Order Status: Sent Specimen: Urine     Urine Culture (New) [[id number]]     Order Status: Canceled Specimen: Urine           Hemodynamics:  Temp (24hrs), Av.6 °C (97.8 °F), Min:36.2 °C (97.2 °F), Max:36.9 °C (98.4 °F)  Temperature: 36.6 °C (97.8 °F)  Pulse  Av.3  Min: 62  Max: 89   Blood Pressure: (!) 151/76     PICC Single Lumen Left Brachial (Active)   Site Assessment Clean;Dry;Intact 24   Line Status Scrubbed the hub prior to access;Blood return noted;Flushed 24   Dressing Type Biopatch;Transparent 24   Dressing Status Clean;Dry;Intact 24   Dressing Intervention N/A 24   Dressing Change Due 24   Date Primary Tubing Changed 24   Date Secondary Tubing Changed 24   Date IV Connector(s) Changed 24 0230   NEXT Primary Tubing Change  24   NEXT Secondary Tubing Change  24   NEXT IV Connector(s) Change 24 6659   Line Necessity Assessed  Antibiotic Therapy Greater than 7 Days 05/24/24 1945     Wound:  @WOUNDLDA(4)@     Fluids:  Intake/Output                               05/25/24 0700 - 05/26/24 0659 05/26/24 0700 - 05/27/24 0659 05/27/24 0700 - 05/28/24 0659     [id number] 8093-7182 Total 1507-6575 5905-6371 Total 8348-1911 1955-6086 Total                    Intake    P.O.  --  -- --  200  -- 200  --  -- --    P.O. -- -- -- 200 -- 200 -- -- --    I.V.  --  -- --  500  -- 500  --  -- --    Volume (mL) (Lactated Ringers) -- -- -- 500 -- 500 -- -- --    IV Piggyback  --  -- --  456.1  -- 456.1  --  -- --    Volume (mL) (ceFAZolin (Ancef) 2 g in  mL IVPB) -- -- -- 456.1 -- 456.1 -- -- --    Total Intake -- -- -- 1156.1 -- 1156.1 -- -- --       Output    Urine  --  -- --  --  -- --  --  -- --    Number of Times Voided -- 1 x 1 x 1 x -- 1 x -- -- --    Stool  --  -- --  --  -- --  --  -- --    Number of Times Stooled -- 1 x 1 x 1 x -- 1 x -- -- --    Blood  --  -- --  10  -- 10  --  -- --    Est. Blood Loss -- -- -- 10 -- 10 -- -- --    Total Output -- -- -- 10 -- 10 -- -- --       Net I/O     -- -- -- 1146.1 -- 1146.1 -- -- --             GI/Nutrition:  Orders Placed This Encounter   Procedures    Diet Order Diet: Consistent CHO (Diabetic)     Standing Status:   Standing     Number of Occurrences:   1     Order Specific Question:   Diet:     Answer:   Consistent CHO (Diabetic) [4]     Medications:  Current Facility-Administered Medications   Medication Last Admin    insulin GLARGINE (Lantus,Semglee) injection      insulin regular (HumuLIN R,NovoLIN R) injection 3 Units at 05/27/24 1323    DAPTOmycin (Cubicin) 700 mg in NS 50 mL IVPB 700 mg at 05/26/24 2045    acetaminophen (Tylenol) tablet 650 mg 650 mg at 05/26/24 2048    oxyCODONE immediate-release (Roxicodone) tablet 5 mg      Or    oxyCODONE immediate release (Roxicodone) tablet 10 mg      hydrALAZINE (Apresoline) injection 20 mg 20 mg at 05/26/24 0434    carvedilol (Coreg) tablet 25 mg 25 mg  at 05/27/24 0837    Respiratory Therapy Consult      senna-docusate (Pericolace Or Senokot S) 8.6-50 MG per tablet 2 Tablet      And    polyethylene glycol/lytes (Miralax) Packet 1 Packet      dextrose 50% (D50W) injection 25 g      amLODIPine (Norvasc) tablet 5 mg 5 mg at 05/27/24 0446    apixaban (Eliquis) tablet 5 mg 5 mg at 05/27/24 0449    lisinopril (Prinivil) tablet 40 mg 40 mg at 05/27/24 0631     Medical Decision Making, by Problem:  Active Hospital Problems    Diagnosis     *Septic arthritis involving sternum ([facility]) [M00.9]     Acute osteomyelitis of clavicle, right ([facility]) [M86.111]     Pleural effusion on right [J90]     DM (diabetes mellitus) ([facility]) [E11.9]     Hypoxia [R09.02]     Diabetic ulcer of foot, limited to breakdown of skin ([facility]) [E11.621, L97.501]     Leukopenia [D72.819]     Chronic deep vein thrombosis (DVT) of right upper extremity ([facility]) [I82.721]      1.  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  2.  Status post acute Staph aureus -- MSSA sepsis 03/25/2024.  3.  Leukopenia.  4.  Anemia of chronic disease.  5.  History of hypertension.  6.  History of elevated blood sugar.  7.  DVT right upper extremity -- currently on therapy.     Plan:  1.  Discontinue vancomycin.  2.  Begin daptomycin 700 mg IV every 24 hours.    3.  CPK level.    4.  Discontinue cefazolin.  5.  Continue to follow patient's cultures to completion from surgery.      Patient appears to be doing well today.  His spirits are up and he is anxious to get out of the hospital.  He is growing corynebacterium stratum from his wound.  I have switched him to daptomycin which has activity against corynebacterium stratum.  I will check with his insurance company tomorrow and see if we can have him approved for outpatient daptomycin therapy.  If we can get him approved hopefully we can get him out of the hospital by Wednesday.  We will continue to follow the patient's cultures to  make sure nothing else grows late.  Total time spent with patient obtaining history, performing physical exam, reviewing labs, reviewing micro, reviewing with staff, reviewing with Dr. Butt and dictating his note 30 total minutes on floor with at least 50% of time spent face-to-face view and 100% time spent during patient care/counseling/consulting with above orders and recommendations today.

## 2024-05-28 LAB
ANION GAP SERPL CALC-SCNC: 12 MMOL/L (ref 7–16)
BASOPHILS # BLD AUTO: 0.7 % (ref 0–1.8)
BASOPHILS # BLD: 0.03 K/UL (ref 0–0.12)
BUN SERPL-MCNC: 11 MG/DL (ref 8–22)
CALCIUM SERPL-MCNC: 9 MG/DL (ref 8.5–10.5)
CHLORIDE SERPL-SCNC: 104 MMOL/L (ref 96–112)
CO2 SERPL-SCNC: 24 MMOL/L (ref 20–33)
CREAT SERPL-MCNC: 0.53 MG/DL (ref 0.5–1.4)
EOSINOPHIL # BLD AUTO: 0.24 K/UL (ref 0–0.51)
EOSINOPHIL NFR BLD: 5.5 % (ref 0–6.9)
ERYTHROCYTE [DISTWIDTH] IN BLOOD BY AUTOMATED COUNT: 42.7 FL (ref 35.9–50)
GFR SERPLBLD CREATININE-BSD FMLA CKD-EPI: 114 ML/MIN/1.73 M 2
GLUCOSE BLD STRIP.AUTO-MCNC: 151 MG/DL (ref 65–99)
GLUCOSE BLD STRIP.AUTO-MCNC: 159 MG/DL (ref 65–99)
GLUCOSE BLD STRIP.AUTO-MCNC: 170 MG/DL (ref 65–99)
GLUCOSE BLD STRIP.AUTO-MCNC: 177 MG/DL (ref 65–99)
GLUCOSE SERPL-MCNC: 143 MG/DL (ref 65–99)
HCT VFR BLD AUTO: 31.4 % (ref 42–52)
HGB BLD-MCNC: 9.9 G/DL (ref 14–18)
IMM GRANULOCYTES # BLD AUTO: 0.01 K/UL (ref 0–0.11)
IMM GRANULOCYTES NFR BLD AUTO: 0.2 % (ref 0–0.9)
LYMPHOCYTES # BLD AUTO: 1.82 K/UL (ref 1–4.8)
LYMPHOCYTES NFR BLD: 41.4 % (ref 22–41)
MCH RBC QN AUTO: 27.1 PG (ref 27–33)
MCHC RBC AUTO-ENTMCNC: 31.5 G/DL (ref 32.3–36.5)
MCV RBC AUTO: 86 FL (ref 81.4–97.8)
MONOCYTES # BLD AUTO: 0.5 K/UL (ref 0–0.85)
MONOCYTES NFR BLD AUTO: 11.4 % (ref 0–13.4)
NEUTROPHILS # BLD AUTO: 1.8 K/UL (ref 1.82–7.42)
NEUTROPHILS NFR BLD: 40.8 % (ref 44–72)
NRBC # BLD AUTO: 0 K/UL
NRBC BLD-RTO: 0 /100 WBC (ref 0–0.2)
PLATELET # BLD AUTO: 402 K/UL (ref 164–446)
PMV BLD AUTO: 9.1 FL (ref 9–12.9)
POTASSIUM SERPL-SCNC: 4.2 MMOL/L (ref 3.6–5.5)
RBC # BLD AUTO: 3.65 M/UL (ref 4.7–6.1)
SODIUM SERPL-SCNC: 140 MMOL/L (ref 135–145)
WBC # BLD AUTO: 4.4 K/UL (ref 4.8–10.8)

## 2024-05-28 PROCEDURE — 94669 MECHANICAL CHEST WALL OSCILL: CPT

## 2024-05-28 PROCEDURE — 80048 BASIC METABOLIC PNL TOTAL CA: CPT

## 2024-05-28 PROCEDURE — A9270 NON-COVERED ITEM OR SERVICE: HCPCS | Performed by: HOSPITALIST

## 2024-05-28 PROCEDURE — 85025 COMPLETE CBC W/AUTO DIFF WBC: CPT

## 2024-05-28 PROCEDURE — 770006 HCHG ROOM/CARE - MED/SURG/GYN SEMI*

## 2024-05-28 PROCEDURE — 700111 HCHG RX REV CODE 636 W/ 250 OVERRIDE (IP): Performed by: HOSPITALIST

## 2024-05-28 PROCEDURE — 700102 HCHG RX REV CODE 250 W/ 637 OVERRIDE(OP): Performed by: HOSPITALIST

## 2024-05-28 PROCEDURE — 99232 SBSQ HOSP IP/OBS MODERATE 35: CPT | Performed by: INTERNAL MEDICINE

## 2024-05-28 PROCEDURE — 82962 GLUCOSE BLOOD TEST: CPT | Mod: 91

## 2024-05-28 RX ADMIN — LISINOPRIL 40 MG: 20 TABLET ORAL at 06:24

## 2024-05-28 RX ADMIN — AMLODIPINE BESYLATE 5 MG: 5 TABLET ORAL at 06:23

## 2024-05-28 RX ADMIN — APIXABAN 5 MG: 5 TABLET, FILM COATED ORAL at 17:49

## 2024-05-28 RX ADMIN — CARVEDILOL 25 MG: 25 TABLET, FILM COATED ORAL at 17:49

## 2024-05-28 RX ADMIN — POTASSIUM CHLORIDE 40 MEQ: 1500 TABLET, EXTENDED RELEASE ORAL at 06:23

## 2024-05-28 RX ADMIN — ACETAMINOPHEN 650 MG: 325 TABLET, FILM COATED ORAL at 22:08

## 2024-05-28 RX ADMIN — AMLODIPINE BESYLATE 5 MG: 5 TABLET ORAL at 17:49

## 2024-05-28 RX ADMIN — APIXABAN 5 MG: 5 TABLET, FILM COATED ORAL at 08:44

## 2024-05-28 RX ADMIN — CARVEDILOL 25 MG: 25 TABLET, FILM COATED ORAL at 08:44

## 2024-05-28 RX ADMIN — FUROSEMIDE 20 MG: 10 INJECTION INTRAMUSCULAR; INTRAVENOUS at 06:44

## 2024-05-28 ASSESSMENT — ENCOUNTER SYMPTOMS
VOMITING: 0
NAUSEA: 0
DIZZINESS: 0
FEVER: 0
DEPRESSION: 0
NECK PAIN: 0
SHORTNESS OF BREATH: 0
BACK PAIN: 0
CONSTIPATION: 0
CLAUDICATION: 0
PALPITATIONS: 0
ABDOMINAL PAIN: 0
COUGH: 0
CHILLS: 0
DIARRHEA: 0

## 2024-05-28 ASSESSMENT — PAIN DESCRIPTION - PAIN TYPE
TYPE: ACUTE PAIN
TYPE: ACUTE PAIN

## 2024-05-28 NOTE — DISCHARGE PLANNING
Care Transition Team Assessment    This RN CM completed assessment with patient A/Ox4 at bedside. Pt is independent with ADLs and needs assistance with grocery shopping and transport in which his friends assist with. Pt has 2L home oxygen thru [facility], HH thru [facility] for wound care and therapy services, and works with [facility] for Home infusion pushes thru his midline.    Patients main supports are his friends Nathan and Lisette, son is also a support but lives in [place]. Son Nigel will be coming to visit pt this Saturday. 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 Per patient lisette will provide a ride home tomorrow for patient.       Information Source  Orientation Level: Oriented X4  Information Given By: Patient  Informant's Name: Sandeep  Who is responsible for making decisions for patient? : Patient    Readmission Evaluation  Is this a readmission?: No    Elopement Risk  Legal Hold: No  Ambulatory or Self Mobile in Wheelchair: Yes  Disoriented: No  Psychiatric Symptoms: None  History of Wandering: No  Elopement this Admit: No  Vocalizing Wanting to Leave: No  Displays Behaviors, Body Language Wanting to Leave: No-Not at Risk for Elopement  Elopement Risk: Not at Risk for Elopement    Interdisciplinary Discharge Planning  Patient or legal guardian wants to designate a caregiver: No    Discharge Preparedness  What is your plan after discharge?: Home with help  What are your discharge supports?: Other (comment)  Prior Functional Level: Ambulatory  Difficulity with ADLs: None  Difficulity with IADLs: Driving, Shopping  Difficulity with IADL Comments: Friends in [place] assist with needs    Functional Assesment  Prior Functional Level: Ambulatory    Finances  Financial Barriers to Discharge: No  Prescription Coverage: Yes    Advance Directive  Advance Directive?: None    Domestic Abuse  Have you ever been the victim of abuse or violence?:  No  Possible Abuse/Neglect Reported to:: Not Applicable    Psychological Assessment  History of Substance Abuse: None  History of Psychiatric Problems: No  Non-compliant with Treatment: No  Newly Diagnosed Illness: No    Discharge Risks or Barriers  Discharge risks or barriers?: Complex medical needs  Patient risk factors: Vulnerable adult    Anticipated Discharge Information  Discharge Disposition: D/T to home under A care in anticipation of covered skilled care (06)  Discharge Address: [address] [place] [id number]  Discharge Contact Phone Number: [phone]

## 2024-05-28 NOTE — PROGRESS NOTES
Assumed care of pt from NOC RN. Pt reporting frustration with current insulin management. MD notified and pts Lantus modified to 20 units from 15 units every evening. Pt reports no pain at this time. Call light and personal belongings in reach. Bed locked in lowest position. All needs met at this time.

## 2024-05-28 NOTE — DISCHARGE PLANNING
Received Choice form at [phone]  Agency/Facility Name: [facility]  Referral sent per Choice form @ [phone]

## 2024-05-28 NOTE — CARE PLAN
The patient is Stable - Low risk of patient condition declining or worsening    Shift Goals  Clinical Goals: pt will remain free from signs of infection throughout shift  Patient Goals: rest; glucose control  Family Goals: maria fernanda    Progress made toward(s) clinical / shift goals:    Problem: Knowledge Deficit - Standard  Goal: Patient and family/care givers will demonstrate understanding of plan of care, disease process/condition, diagnostic tests and medications  Outcome: Progressing     Problem: Fall Risk  Goal: Patient will remain free from falls  Outcome: Progressing     Problem: Pain - Standard  Goal: Alleviation of pain or a reduction in pain to the patient’s comfort goal  Outcome: Progressing     Problem: Hemodynamics  Goal: Patient's hemodynamics, fluid balance and neurologic status will be stable or improve  Outcome: Progressing       Patient is not progressing towards the following goals:

## 2024-05-28 NOTE — PROGRESS NOTES
Hospital Medicine Daily Progress Note    Date of Service  5/29/2024    Chief Complaint  Chuy Moody is a 59 y.o. male admitted 5/24/2024 with drainage from right proximal clavicle.    Hospital Course  Chuy Moody is a 59 y.o. male with history of DM and recent hospitalization for osteomyelitis of the right clavicle. He had been hospitalized at [facility] for several weeks during which time he underwent surgery of the infected clavicle. That hospital stay was complicated by a DVT of the upper extremity and he was therefore started  He was then transferred to an LTAC and was reportedly discharged to home with a picc line and iv cefazolin last week.  Several days ago, he noticed some discharge from the surgical site and went an urgent care for evaluation and they referred him to the ER. Therefore he presented here on 5/24/2024. He also suffered a mechanical ground level fall at the urgent care, tripping over carpet that was sticking up, he has a mild abrasion on his left elbow and both knees from this, he denies any acute pain, no loc, no head injury and a CT of the head was checked in the ER as he is on blood thinner and no acute findings. Blood cultures were drawn and I consulted Dr. Lawton of ID.     Interval Problem Update  5/28  Patient is doing well. Reduced pain around the clavicular site. Remains afebrile. Abx's infusion has been set up and should be able to discharge on 5/29.    I have discussed this patient's plan of care and discharge plan at IDT rounds today with Case Management, Nursing, Nursing leadership, and other members of the IDT team.    Consultants/Specialty  infectious disease and orthopedics    Code Status  Full Code    Disposition  The patient is medically cleared for discharge to home or a post-acute facility.  Anticipate discharge to: home with organized home healthcare and close outpatient follow-up  Home health RN for IV PICC line weekly dressing changes.  Plan for home IV infusions  via PICC line. Orders to be written by ID.  Lives in [place].  I have placed the appropriate orders for post-discharge needs.    Review of Systems  Review of Systems   Constitutional:  Negative for chills, fever and malaise/fatigue.   Respiratory:  Negative for shortness of breath.    Cardiovascular:  Positive for leg swelling (stable). Negative for chest pain, palpitations and claudication.   Gastrointestinal:  Negative for abdominal pain, diarrhea, nausea and vomiting.   Musculoskeletal:  Positive for joint pain (Right clavicular sternal joint, reduced). Negative for back pain and neck pain.   Neurological:  Negative for dizziness.   Psychiatric/Behavioral:  Negative for depression.         Physical Exam  Temp:  [35.9 °C (96.6 °F)-36.7 °C (98 °F)] 36.7 °C (98 °F)  Pulse:  [65-71] 71  Resp:  [16-18] 18  BP: (130-145)/(61-73) 145/73  SpO2:  [93 %-97 %] 95 %    Physical Exam  Constitutional:       General: He is not in acute distress.     Appearance: Normal appearance. He is not diaphoretic.      Comments: Sitting upright in bed, appears comfortable   HENT:      Head: Normocephalic and atraumatic.      Mouth/Throat:      Mouth: Mucous membranes are moist.      Pharynx: No oropharyngeal exudate.   Eyes:      General:         Right eye: No discharge.         Left eye: No discharge.      Conjunctiva/sclera: Conjunctivae normal.      Pupils: Pupils are equal, round, and reactive to light.   Neck:      Thyroid: No thyromegaly.      Vascular: No JVD.      Trachea: No tracheal deviation.   Cardiovascular:      Rate and Rhythm: Normal rate and regular rhythm.      Pulses: Normal pulses.      Heart sounds: Normal heart sounds. No murmur heard.     No friction rub. No gallop.   Pulmonary:      Effort: Pulmonary effort is normal. No respiratory distress.      Breath sounds: Normal breath sounds. No wheezing.      Comments: Right proximal clavicle with 2 cm x 1 cm open skin lesion noted with mild surrounding erythema.  He is  tender to touch at bandage site. No clear changes noted on 5/28  Chest:      Chest wall: Tenderness present.   Abdominal:      General: Abdomen is flat. Bowel sounds are normal. There is no distension.      Palpations: Abdomen is soft.      Tenderness: There is no abdominal tenderness.   Musculoskeletal:         General: No tenderness. Normal range of motion.      Cervical back: Normal range of motion and neck supple.      Right lower leg: Edema present.      Left lower leg: Edema present.   Lymphadenopathy:      Cervical: No cervical adenopathy.   Skin:     General: Skin is warm and dry.      Findings: No erythema or rash.   Neurological:      General: No focal deficit present.      Mental Status: He is alert and oriented to person, place, and time.      Cranial Nerves: No cranial nerve deficit.      Motor: No abnormal muscle tone.   Psychiatric:         Mood and Affect: Mood normal.         Behavior: Behavior normal.         Thought Content: Thought content normal.         Judgment: Judgment normal.         Fluids    Intake/Output Summary (Last 24 hours) at 5/29/2024 0735  Last data filed at 5/28/2024 2149  Gross per 24 hour   Intake 480 ml   Output --   Net 480 ml       Laboratory  Recent Labs     05/27/24  0341 05/28/24  0630 05/29/24  0550   WBC 3.9* 4.4* 4.3*   RBC 3.59* 3.65* 3.51*   HEMOGLOBIN 10.1* 9.9* 9.7*   HEMATOCRIT 30.5* 31.4* 30.1*   MCV 85.0 86.0 85.8   MCH 28.1 27.1 27.6   MCHC 33.1 31.5* 32.2*   RDW 41.6 42.7 42.1   PLATELETCT 412 402 366   MPV 9.0 9.1 9.2     Recent Labs     05/27/24  0341 05/28/24  0630 05/29/24  0550   SODIUM 137 140 140   POTASSIUM 3.9 4.2 3.9   CHLORIDE 102 104 105   CO2 21 24 27   GLUCOSE 240* 143* 119*   BUN 13 11 9   CREATININE 0.55 0.53 0.49*   CALCIUM 8.5 9.0 8.9                     Imaging  EC-ECHOCARDIOGRAM COMPLETE W/ CONT   Final Result      CT-CHEST (THORAX) WITH   Final Result      1.  There are changes involving the medial head of the right clavicle and  sternoclavicular joint consistent with osteomyelitis/septic arthritis. There is a small amount of surrounding fluid with ill-defined phlegmonous change which could be a small    abscess.   2.  There are nonspecific superior mediastinal nodes, probably reactive inflammatory nodes.   3.  There is a moderate dependent right and a small dependent left pleural effusion.   4.  Bibasilar airspace disease is most consistent with atelectasis. No pneumonia or pneumothorax.      Fleischner Society pulmonary nodule recommendations:   Not Applicable         DX-CLAVICLE RIGHT   Final Result      No fracture detected.      CT-HEAD W/O   Final Result      Head CT without contrast within normal limits. No evidence of acute cerebral infarction, hemorrhage or mass lesion.         DX-FOOT-COMPLETE 3+ RIGHT   Final Result      1.  No acute osseous abnormality. If symptoms persist, follow-up radiographs can be obtained in 7-10 days.   2.  Plantar calcaneal enthesophyte.      DX-CHEST-PORTABLE (1 VIEW)   Final Result      1. Right lung basilar opacity with moderate right pleural effusion, concerning for pneumonia or atelectasis.   2. Appropriate position of the left upper extremity PIC catheter.   3. Normal cardiomediastinal silhouette size.           Assessment/Plan  * Septic arthritis involving sternum (HCC)- (present on admission)  Assessment & Plan    No evidence of sirs or sepsis  B/c drawn  Picc in place and c/d  Culture of wound pending.  Martir HOPKINS consulted.  Continue ancef for now, since this occurred on abx will add vanc until further instructions from martir HOPKINS  5/25:  CT chest with contrast concerning for abscess and septic arthritis at right clavicular sternal joint. LNS noted and fluid collection below joint noted. Dr. Donato reviewed, needs OR I&D in a.m.  patient and friend aware of findings.    Will need OR cultures.  5/26:  ID consult appreciated with prior MSSA infection, now with corynebacterium striatum growing from  wound, pending deep OR cultures and bone culture. Underwent OR I&D of sternoclavicular joint and clavicle bone this a.m  tolerated well.  Change from ancef and vancomycin IV to daptomycin IV daily x 6 weeks.    CPK weekly.      Acute osteomyelitis of clavicle, right (HCC)- (present on admission)  Assessment & Plan  5/26:  s/p OR bone biopsy and deep cultures, s/p OR wash out.  Corynebacterium striatum, changed ancef and vancomycin to daptomycin IV daily.  ID and ortho appreciated    Remains clinically stable on 5/28. Outpatient IV abx infusions have been set up and if patient does well overnight can likely be discharged on 5/29.     Pleural effusion on right- (present on admission)  Assessment & Plan  Will likely need IR thoracentesis.   Plan to start lasix post op.  Check echo.    Chronic deep vein thrombosis (DVT) of right upper extremity (HCC)- (present on admission)  Assessment & Plan  Sub acute  Continue eliquis    Leukopenia- (present on admission)  Assessment & Plan  Mild  following    Diabetic ulcer of foot, limited to breakdown of skin ([facility])- (present on admission)  Assessment & Plan  R foot  Present on admission  Wound care  following    Hypoxia- (present on admission)  Assessment & Plan  Subacute  Query jasmeet due to body habitus, sleep study recommended as outpatient   IS  RT  Following  Stable on recent baseline of 2L    DM (diabetes mellitus) ([facility])- (present on admission)  Assessment & Plan  No dka  Diabetic diet  Continue long acting insulin and SSI  Hypoglycemic protocol  Following  BS's are doing ok, WBC and CMP are ok.  I personally reviewed each of these labs on 5/29         VTE prophylaxis:    therapeutic anticoagulation with eliquis 5 mg BID      I have performed a physical exam and reviewed and updated ROS and Plan today (5/29/2024). In review of yesterday's note (5/28/2024), there are no changes except as documented above.

## 2024-05-28 NOTE — PROGRESS NOTES
Infectious Disease Progress Note    Author: Bharathi Grady M.D. Date & Time created: 5/28/2024  2:23 PM    Interval History:  Daptomycin  5/26 - Day #2    Prior Antibiotics:  Cefazolin 5/24-26  Vancomycin 5/24/2026 5/27: Patient appears to be doing well today.  His spirits are up and he is anxious to get out of the hospital.  He is growing corynebacterium stratum from his wound.  I have switched him to daptomycin which has activity against corynebacterium stratum.  I will check with his insurance company tomorrow and see if we can have him approved for outpatient daptomycin therapy.  If we can get him approved hopefully we can get him out of the hospital by Wednesday.  We will continue to follow the patient's cultures to make sure nothing else grows late.  5/28: The patient has no new complaints today.  He states his SC joint on the right side is feeling better every day.  We will adjust his daptomycin dosing from tonight until tomorrow morning and then he can be discharged home.  The patient will then be instructed to come to our office on Thursday to  his supply of antibiotics for home therapy.  I have discussed this at length today with the patient and his nurse.  I have answered all of his ID questions today.  He is approved for OPAT through our office as he was before.    Labs Reviewed, Medications Reviewed, Radiology Reviewed, Wound Reviewed, Fluids Reviewed, and GI Nutrition Reviewed    Review of Systems:  Review of Systems   Constitutional:  Negative for chills and fever.   Respiratory:  Negative for cough and shortness of breath.    Cardiovascular:  Negative for chest pain.   Gastrointestinal:  Negative for abdominal pain, constipation, diarrhea, nausea and vomiting.   Genitourinary:  Negative for dysuria, frequency and urgency.   Musculoskeletal:  Joint pain: Better every day..   Skin:  Negative for itching.       Physical Exam:  Physical Exam  Vitals and nursing note reviewed.    Constitutional:       Appearance: He is obese.   HENT:      Head: Normocephalic and atraumatic.      Mouth/Throat:      Mouth: Mucous membranes are moist.   Eyes:      Conjunctiva/sclera: Conjunctivae normal.   Cardiovascular:      Rate and Rhythm: Normal rate and regular rhythm.      Heart sounds: No murmur heard.  Pulmonary:      Effort: Pulmonary effort is normal. No respiratory distress.      Breath sounds: Normal breath sounds. No wheezing or rhonchi.   Abdominal:      General: Abdomen is flat. There is no distension.      Palpations: Abdomen is soft.      Tenderness: There is no guarding.   Musculoskeletal:      Comments: His incision is intact without significant drainage, erythema, crepitus or fluctuance.  Pain is decreasing daily he states.   Skin:     General: Skin is warm and dry.   Neurological:      General: No focal deficit present.      Mental Status: He is alert and oriented to person, place, and time.   Psychiatric:         Mood and Affect: Mood normal.         Behavior: Behavior normal.         Labs:  Recent Results (from the past 24 hour(s))   POCT glucose device results    Collection Time: 05/27/24  6:15 PM   Result Value Ref Range    POC Glucose, Blood 208 (H) 65 - 99 mg/dL   POCT glucose device results    Collection Time: 05/27/24 10:18 PM   Result Value Ref Range    POC Glucose, Blood 178 (H) 65 - 99 mg/dL   CBC WITH DIFFERENTIAL    Collection Time: 05/28/24  6:30 AM   Result Value Ref Range    WBC 4.4 (L) 4.8 - 10.8 K/uL    RBC 3.65 (L) 4.70 - 6.10 M/uL    Hemoglobin 9.9 (L) 14.0 - 18.0 g/dL    Hematocrit 31.4 (L) 42.0 - 52.0 %    MCV 86.0 81.4 - 97.8 fL    MCH 27.1 27.0 - 33.0 pg    MCHC 31.5 (L) 32.3 - 36.5 g/dL    RDW 42.7 35.9 - 50.0 fL    Platelet Count 402 164 - 446 K/uL    MPV 9.1 9.0 - 12.9 fL    Neutrophils-Polys 40.80 (L) 44.00 - 72.00 %    Lymphocytes 41.40 (H) 22.00 - 41.00 %    Monocytes 11.40 0.00 - 13.40 %    Eosinophils 5.50 0.00 - 6.90 %    Basophils 0.70 0.00 - 1.80 %     Immature Granulocytes 0.20 0.00 - 0.90 %    Nucleated RBC 0.00 0.00 - 0.20 /100 WBC    Neutrophils (Absolute) 1.80 (L) 1.82 - 7.42 K/uL    Lymphs (Absolute) 1.82 1.00 - 4.80 K/uL    Monos (Absolute) 0.50 0.00 - 0.85 K/uL    Eos (Absolute) 0.24 0.00 - 0.51 K/uL    Baso (Absolute) 0.03 0.00 - 0.12 K/uL    Immature Granulocytes (abs) 0.01 0.00 - 0.11 K/uL    NRBC (Absolute) 0.00 K/uL   Basic Metabolic Panel    Collection Time: 05/28/24  6:30 AM   Result Value Ref Range    Sodium 140 135 - 145 mmol/L    Potassium 4.2 3.6 - 5.5 mmol/L    Chloride 104 96 - 112 mmol/L    Co2 24 20 - 33 mmol/L    Glucose 143 (H) 65 - 99 mg/dL    Bun 11 8 - 22 mg/dL    Creatinine 0.53 0.50 - 1.40 mg/dL    Calcium 9.0 8.5 - 10.5 mg/dL    Anion Gap 12.0 7.0 - 16.0   ESTIMATED GFR    Collection Time: 05/28/24  6:30 AM   Result Value Ref Range    GFR (CKD-EPI) 114 >60 mL/min/1.73 m 2   POCT glucose device results    Collection Time: 05/28/24  7:35 AM   Result Value Ref Range    POC Glucose, Blood 159 (H) 65 - 99 mg/dL   POCT glucose device results    Collection Time: 05/28/24  1:04 PM   Result Value Ref Range    POC Glucose, Blood 151 (H) 65 - 99 mg/dL     Results       Procedure Component Value Units Date/Time    CULTURE TISSUE W/ GRM STAIN [[id number]] Collected: 05/26/24 0856    Order Status: Completed Specimen: Tissue Updated: 05/28/24 0807     Significant Indicator NEG     Source TISS     Site RIGHT CLAVICLE     Culture Result No growth at 48 hours.     Gram Stain Result No organisms seen.    Narrative:      Surgery Specimen    Anaerobic Culture [[id number]] Collected: 05/26/24 0856    Order Status: Completed Specimen: Tissue Updated: 05/28/24 0807     Significant Indicator NEG     Source TISS     Site RIGHT CLAVICLE     Culture Result Culture in progress.    Narrative:      Surgery Specimen    Fungal Culture [[id number]] Collected: 05/26/24 0856    Order Status: Completed Specimen: Tissue Updated: 05/28/24 0807     Significant Indicator NEG      Source TISS     Site RIGHT CLAVICLE     Culture Result Culture in progress.     Fungal Smear Results No fungal elements seen.    Narrative:      Surgery Specimen    AFB Culture [[id number]] Collected: 05/26/24 0856    Order Status: Completed Specimen: Tissue Updated: 05/28/24 0807     Significant Indicator NEG     Source TISS     Site RIGHT CLAVICLE     Culture Result Culture in progress.     AFB Smear Results No acid fast bacilli seen.    Narrative:      Surgery Specimen    GRAM STAIN [[id number]] Collected: 05/26/24 0856    Order Status: Completed Specimen: Tissue Updated: 05/27/24 1721     Significant Indicator .     Source TISS     Site RIGHT CLAVICLE     Gram Stain Result No organisms seen.    Narrative:      Surgery Specimen    Fungal Smear [[id number]] Collected: 05/26/24 0856    Order Status: Completed Specimen: Tissue Updated: 05/27/24 1721     Significant Indicator NEG     Source TISS     Site RIGHT CLAVICLE     Fungal Smear Results No fungal elements seen.    Narrative:      Surgery Specimen    Acid Fast Stain [[id number]] Collected: 05/26/24 0856    Order Status: Completed Specimen: Tissue Updated: 05/27/24 1721     Significant Indicator NEG     Source TISS     Site RIGHT CLAVICLE     AFB Smear Results No acid fast bacilli seen.    Narrative:      Surgery Specimen    CULTURE WOUND W/ GRAM STAIN [[id number]]  (Abnormal) Collected: 05/24/24 1533    Order Status: Completed Specimen: Wound from Chest Updated: 05/26/24 1127     Significant Indicator POS     Source WND     Site NECK     Culture Result -     Gram Stain Result Rare WBCs.  No organisms seen.       Culture Result Corynebacterium striatum group  Rare growth      Blood Culture - Draw one from central line and one from peripheral site [[id number]] Collected: 05/24/24 1443    Order Status: Completed Specimen: Blood from Peripheral Updated: 05/25/24 0733     Significant Indicator NEG     Source BLD     Site PERIPHERAL     Culture Result No Growth  Note:  Blood cultures are incubated for 5 days and  are monitored continuously.Positive blood cultures  are called to the RN and reported as soon as  they are identified.      Narrative:      Left Hand    Blood Culture - Draw one from central line and one from peripheral site [[id number]] Collected: 24 1445    Order Status: Completed Specimen: Blood from Line Updated: 24 0733     Significant Indicator NEG     Source BLD     Site Peripheral     Culture Result No Growth  Note: Blood cultures are incubated for 5 days and  are monitored continuously.Positive blood cultures  are called to the RN and reported as soon as  they are identified.      Narrative:      Left Hand    MRSA By PCR (Amp) [[id number]] Collected: 24 211    Order Status: Completed Specimen: Respirate from Nares Updated: 24 2326     MRSA by PCR Negative    GRAM STAIN [[id number]] Collected: 24 1533    Order Status: Completed Specimen: Wound Updated: 24     Significant Indicator .     Source WND     Site NECK     Gram Stain Result Rare WBCs.  No organisms seen.      Urine Culture (New) [[id number]]     Order Status: Canceled Specimen: Urine     Urinalysis [[id number]] Collected: 24 0000    Order Status: Canceled Specimen: Urine           Hemodynamics:  Temp (24hrs), Av.3 °C (97.4 °F), Min:36.1 °C (96.9 °F), Max:36.5 °C (97.7 °F)  Temperature: 36.4 °C (97.6 °F)  Pulse  Av.3  Min: 62  Max: 89   Blood Pressure: (!) 141/71     PICC Single Lumen Left Brachial (Active)   Site Assessment Clean;Dry;Intact 24   Line Status Scrubbed the hub prior to access;Flushed;Blood return noted;Saline locked 24   Dressing Type Biopatch;Transparent 24   Dressing Status Clean;Dry;Intact 24   Dressing Intervention N/A 24   Dressing Change Due 24   Date Primary Tubing Changed 24   Date Secondary Tubing Changed 24   Date IV  Connector(s) Changed 05/26/24 05/26/24 0230   NEXT Primary Tubing Change  06/01/24 05/27/24 2230   NEXT Secondary Tubing Change  05/27/24 05/27/24 2230   NEXT IV Connector(s) Change 06/01/24 05/26/24 0230   Line Necessity Assessed Antibiotic Therapy Greater than 7 Days 05/24/24 1945     Wound:  @WOUNDLDA(4)@     Fluids:  Intake/Output                               05/26/24 0700 - 05/27/24 0659 05/27/24 0700 - 05/28/24 0659 05/28/24 0700 - 05/29/24 0659     1620-3076 3351-2517 Total 9652-0554 1467-1776 Total 3064-5376 3047-0969 Total                    Intake    P.O.  200  -- 200  480  480 960  --  -- --    P.O. 200 -- 200 480 480 960 -- -- --    I.V.  500  -- 500  --  -- --  --  -- --    Volume (mL) (Lactated Ringers) 500 -- 500 -- -- -- -- -- --    IV Piggyback  456.1  -- 456.1  --  -- --  --  -- --    Volume (mL) (ceFAZolin (Ancef) 2 g in  mL IVPB) 456.1 -- 456.1 -- -- -- -- -- --    Total Intake 1156.1 -- 1156.1 480 480 960 -- -- --       Output    Urine  --  -- --  --  -- --  --  -- --    Number of Times Voided 1 x -- 1 x 3 x 1 x 4 x -- -- --    Stool  --  -- --  --  -- --  --  -- --    Number of Times Stooled 1 x -- 1 x -- -- -- -- -- --    Blood  10  -- 10  --  -- --  --  -- --    Est. Blood Loss 10 -- 10 -- -- -- -- -- --    Total Output 10 -- 10 -- -- -- -- -- --       Net I/O     1146.1 -- 1146.1 480 480 960 -- -- --             GI/Nutrition:  Orders Placed This Encounter   Procedures    Diet Order Diet: Consistent CHO (Diabetic)     Standing Status:   Standing     Number of Occurrences:   1     Order Specific Question:   Diet:     Answer:   Consistent CHO (Diabetic) [4]     Medications:  Current Facility-Administered Medications   Medication Last Admin    insulin GLARGINE (Lantus,Semglee) injection      insulin regular (HumuLIN R,NovoLIN R) injection 2 Units at 05/28/24 1329    furosemide (Lasix) injection 20 mg 20 mg at 05/28/24 0644    potassium chloride SA (Kdur) tablet 40 mEq 40 mEq at 05/28/24  0623    DAPTOmycin (Cubicin) 700 mg in NS 50 mL IVPB 700 mg at 05/27/24 1826    acetaminophen (Tylenol) tablet 650 mg 650 mg at 05/26/24 2048    oxyCODONE immediate-release (Roxicodone) tablet 5 mg      Or    oxyCODONE immediate release (Roxicodone) tablet 10 mg      hydrALAZINE (Apresoline) injection 20 mg 20 mg at 05/26/24 0434    carvedilol (Coreg) tablet 25 mg 25 mg at 05/28/24 0844    Respiratory Therapy Consult      senna-docusate (Pericolace Or Senokot S) 8.6-50 MG per tablet 2 Tablet      And    polyethylene glycol/lytes (Miralax) Packet 1 Packet      dextrose 50% (D50W) injection 25 g      amLODIPine (Norvasc) tablet 5 mg 5 mg at 05/28/24 0623    apixaban (Eliquis) tablet 5 mg 5 mg at 05/28/24 0844    lisinopril (Prinivil) tablet 40 mg 40 mg at 05/28/24 0624     Medical Decision Making, by Problem:  Active Hospital Problems    Diagnosis     *Septic arthritis involving sternum ([facility]) [M00.9]     Acute osteomyelitis of clavicle, right ([facility]) [M86.111]     Pleural effusion on right [J90]     DM (diabetes mellitus) ([facility]) [E11.9]     Hypoxia [R09.02]     Diabetic ulcer of foot, limited to breakdown of skin ([facility]) [E11.621, L97.501]     Leukopenia [D72.819]     Chronic deep vein thrombosis (DVT) of right upper extremity ([facility]) [I82.721]      1.  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  2.  Status post acute Staph aureus -- MSSA sepsis 03/25/2024.  3.  Leukopenia.  4.  Anemia of chronic disease.  5.  History of hypertension.  6.  History of elevated blood sugar.  7.  DVT right upper extremity -- currently on therapy.     Plan:  1.  Discontinue vancomycin.  2.  Begin daptomycin 700 mg IV every 24 hours.    3.  CPK level.    4.  Discontinue cefazolin.  5.  Continue to follow patient's cultures to completion from surgery.      The patient has no new complaints today.  He states his SC joint on the right side is feeling better every day.  We will adjust his  daptomycin dosing from tonight until tomorrow morning and then he can be discharged home.  The patient will then be instructed to come to our office on Thursday to  his supply of antibiotics for home therapy.  I have discussed this at length today with the patient and his nurse.  I have answered all of his ID questions today.  He is approved for OPAT through our office as he was before. Total time spent with patient obtaining history, performing physical exam, reviewing labs, reviewing micro, reviewing with office, reviewing with insurance company, reviewing with RN, reviewing with case management and dictating his note 35+ total minutes on floor with at least 50% of time spent face-to-face view and 100% time spent during patient care/counseling and discharge discussion with above new orders/recommendations today.

## 2024-05-28 NOTE — DISCHARGE PLANNING
Case Management Discharge Planning    Admission Date: 5/24/2024  GMLOS: 3.4  ALOS: 4    6-Clicks ADL Score: 24  6-Clicks Mobility Score: 22      Anticipated Discharge Dispo: Discharge Disposition: D/T to home under A care in anticipation of covered skilled care (06)  Discharge Address: [address] [address]  Discharge Contact Phone Number: [phone]    DME Needed: No    Action(s) Taken: Referral(s) sent    This RN CM completed assessment with patient at bedside and Sandeep would like to go thru [facility] on discharge for IV antibiotics.     This RN CM called [facility] [phone] to confirm is they have received an order and see if they have started insurance auth for patient IV abx. This RN CM was updated that office staff is looking up pt case now and will return call back.    Pt gave choice for [facility] and this RN CM asked for updated HH order to resume HH on discharge.     This RN CM received a phone call back from Vivi with [facility] and was updated that they received the order and will provide pt with infusion services thru their office. No insurance auth is needed and he is already on service with patient.     Patient confirmed at bedside that he has an appointment scheduled with [facility] for Thursday at 1030am. Discussed with pt bedside nurse that he is anticipated to discharge tomorrow. First dose of daptomycin is at 1800 today.    Escalations Completed: None    Medically Clear: No    Next Steps: Pending medical clearance and insurance auth for IV abx.     Barriers to Discharge: Pending Insurance Authorization    Is the patient up for discharge tomorrow: No

## 2024-05-28 NOTE — PROGRESS NOTES
Hospital Medicine Daily Progress Note    Date of Service  5/27/2024    Chief Complaint  Chuy Moody is a 59 y.o. male admitted 5/24/2024 with drainage from right proximal clavicle.    Hospital Course  Chuy Moody is a 59 y.o. male with history of DM and recent hospitalization for osteomyelitis of the right clavicle. He had been hospitalized at [facility] for several weeks during which time he underwent surgery of the infected clavicle. That hospital stay was complicated by a DVT of the upper extremity and he was therefore started  He was then transferred to an LTAC and was reportedly discharged to home with a picc line and iv cefazolin last week.  Several days ago, he noticed some discharge from the surgical site and went an urgent care for evaluation and they referred him to the ER. Therefore he presented here on 5/24/2024. He also suffered a mechanical ground level fall at the urgent care, tripping over carpet that was sticking up, he has a mild abrasion on his left elbow and both knees from this, he denies any acute pain, no loc, no head injury and a CT of the head was checked in the ER as he is on blood thinner and no acute findings. Blood cultures were drawn and I consulted Dr. Lawton of ID.     Interval Problem Update  5/25: Patient states that he had a large amount of drainage from his right clavicle after 6 weeks of IV antibiotics.  Chest x-ray on admission is not adequate to evaluate for abscess.  I did order CT chest with IV contrast that showed significant fluid at the sternal clavicular joint suspicious for abscess and/or septic arthritis.  Several lymph nodes are noted and fluid below the sternoclavicular joint.  Discussed with orthopedics Dr. Donato who reviewed CT chest and agreed that patient needed to go to the OR in a.m. for washout, new or cultures.  Bilateral pleural effusions also noted.  Patient has been on Lasix in the past.  For now given IV contrast will be n.p.o. and on IV  vancomycin, hold on IV Lasix for now.  Holding Eliquis dose tonight n.p.o. at midnight coags in a.m. repeat CBC BMP.  5/26:  ID consult appreciated with prior MSSA infection, now with corynebacterium striatum growing from wound, pending deep OR cultures and bone culture. Underwent OR I&D of sternoclavicular joint and clavicle bone this a.m  tolerated well.  Change from ancef and vancomycin IV to daptomycin IV daily x 6 weeks.  Replace K po.   5/27: Patient states he has almost no pain from his right clavicle anymore.  He states Tylenol is all he needs for pain.  I am waiting on final or cultures.  So far corynebacterium stratum sensitive to daptomycin.  I have ordered home health PICC line dressing changes for [facility] patient's home.  ID will need to order outpatient IV antibiotics.  Likely daptomycin IV daily for 6 weeks.  I have also increased patient's Lantus from 8 units to 15 units.  He states he takes 20 units at home.  Hemoglobin A1c pending.  Echo EF 60% no endocarditis noted.  Due to patient's edema from prolonged hospitalization IV fluids.  I have ordered Lasix 20 mg IV daily for 3 days.    I have discussed this patient's plan of care and discharge plan at IDT rounds today with Case Management, Nursing, Nursing leadership, and other members of the IDT team.    Consultants/Specialty  infectious disease and orthopedics    Code Status  Full Code    Disposition  The patient is not medically cleared for discharge to home or a post-acute facility.  Anticipate discharge to: home with organized home healthcare and close outpatient follow-up  Home health RN for IV PICC line weekly dressing changes.  Plan for home IV infusions via PICC line. Orders to be written by ID.  Lives in [place].  I have placed the appropriate orders for post-discharge needs.    Review of Systems  Review of Systems   Constitutional:  Negative for chills, diaphoresis, fever and malaise/fatigue.   HENT:  Negative for congestion and sore  throat.    Eyes:  Negative for pain and discharge.   Respiratory:  Positive for shortness of breath. Negative for cough, hemoptysis, sputum production and wheezing.    Cardiovascular:  Positive for leg swelling. Negative for chest pain, palpitations and claudication.   Gastrointestinal:  Negative for abdominal pain, constipation, diarrhea, melena, nausea and vomiting.   Genitourinary:  Negative for dysuria, frequency and urgency.   Musculoskeletal:  Positive for joint pain (Right clavicular sternal joint). Negative for back pain, myalgias and neck pain.   Skin:  Negative for itching and rash.   Neurological:  Negative for dizziness, sensory change, speech change, focal weakness, loss of consciousness, weakness and headaches.   Endo/Heme/Allergies:  Does not bruise/bleed easily.   Psychiatric/Behavioral:  Negative for depression, substance abuse and suicidal ideas.         Physical Exam  Temp:  [36.2 °C (97.2 °F)-36.7 °C (98 °F)] 36.3 °C (97.3 °F)  Pulse:  [72-89] 72  Resp:  [16-18] 18  BP: (111-151)/(60-83) 127/63  SpO2:  [94 %-96 %] 96 %    Physical Exam  Constitutional:       General: He is not in acute distress.     Appearance: Normal appearance. He is not diaphoretic.   HENT:      Head: Normocephalic and atraumatic.      Mouth/Throat:      Mouth: Mucous membranes are moist.      Pharynx: No oropharyngeal exudate.   Eyes:      General: No scleral icterus.        Right eye: No discharge.         Left eye: No discharge.      Conjunctiva/sclera: Conjunctivae normal.      Pupils: Pupils are equal, round, and reactive to light.   Neck:      Thyroid: No thyromegaly.      Vascular: No JVD.      Trachea: No tracheal deviation.   Cardiovascular:      Rate and Rhythm: Normal rate and regular rhythm.      Pulses: Normal pulses.      Heart sounds: Normal heart sounds. No murmur heard.     No friction rub. No gallop.   Pulmonary:      Effort: Pulmonary effort is normal. No respiratory distress.      Breath sounds: Normal  breath sounds. No wheezing or rales.      Comments: Right proximal clavicle with 2 cm x 1 cm open skin lesion noted with mild surrounding erythema.  He is tender to touch at bandage site.  Chest:      Chest wall: No tenderness.   Abdominal:      General: Abdomen is flat. Bowel sounds are normal. There is no distension.      Palpations: Abdomen is soft. There is no mass.      Tenderness: There is no abdominal tenderness. There is no guarding or rebound.   Musculoskeletal:         General: No tenderness. Normal range of motion.      Cervical back: Normal range of motion and neck supple.      Right lower leg: Edema present.      Left lower leg: Edema present.   Lymphadenopathy:      Cervical: No cervical adenopathy.   Skin:     General: Skin is warm and dry.      Findings: No erythema or rash.   Neurological:      General: No focal deficit present.      Mental Status: He is alert and oriented to person, place, and time.      Cranial Nerves: No cranial nerve deficit.      Motor: No abnormal muscle tone.   Psychiatric:         Mood and Affect: Mood normal.         Behavior: Behavior normal.         Thought Content: Thought content normal.         Judgment: Judgment normal.         Fluids    Intake/Output Summary (Last 24 hours) at 5/27/2024 1921  Last data filed at 5/27/2024 1400  Gross per 24 hour   Intake 480 ml   Output --   Net 480 ml       Laboratory  Recent Labs     05/25/24  0459 05/26/24  0255 05/27/24  0341   WBC 4.1* 3.6* 3.9*   RBC 3.62* 3.56* 3.59*   HEMOGLOBIN 9.8* 9.7* 10.1*   HEMATOCRIT 31.7* 31.3* 30.5*   MCV 87.6 87.9 85.0   MCH 27.1 27.2 28.1   MCHC 30.9* 31.0* 33.1   RDW 42.8 43.1 41.6   PLATELETCT 387 396 412   MPV 9.2 9.5 9.0     Recent Labs     05/25/24  0459 05/26/24  0255 05/27/24  0341   SODIUM 141 142 137   POTASSIUM 3.4* 3.2* 3.9   CHLORIDE 105 105 102   CO2 24 24 21   GLUCOSE 101* 119* 240*   BUN 7* 7* 13   CREATININE 0.46* 0.53 0.55   CALCIUM 8.6 8.8 8.5     Recent Labs     05/26/24  0255    APTT 32.0   INR 1.05               Imaging  EC-ECHOCARDIOGRAM COMPLETE W/ CONT   Final Result      CT-CHEST (THORAX) WITH   Final Result      1.  There are changes involving the medial head of the right clavicle and sternoclavicular joint consistent with osteomyelitis/septic arthritis. There is a small amount of surrounding fluid with ill-defined phlegmonous change which could be a small    abscess.   2.  There are nonspecific superior mediastinal nodes, probably reactive inflammatory nodes.   3.  There is a moderate dependent right and a small dependent left pleural effusion.   4.  Bibasilar airspace disease is most consistent with atelectasis. No pneumonia or pneumothorax.      Fleischner Society pulmonary nodule recommendations:   Not Applicable         DX-CLAVICLE RIGHT   Final Result      No fracture detected.      CT-HEAD W/O   Final Result      Head CT without contrast within normal limits. No evidence of acute cerebral infarction, hemorrhage or mass lesion.         DX-FOOT-COMPLETE 3+ RIGHT   Final Result      1.  No acute osseous abnormality. If symptoms persist, follow-up radiographs can be obtained in 7-10 days.   2.  Plantar calcaneal enthesophyte.      DX-CHEST-PORTABLE (1 VIEW)   Final Result      1. Right lung basilar opacity with moderate right pleural effusion, concerning for pneumonia or atelectasis.   2. Appropriate position of the left upper extremity PIC catheter.   3. Normal cardiomediastinal silhouette size.           Assessment/Plan  * Septic arthritis involving sternum (HCC)- (present on admission)  Assessment & Plan    No evidence of sirs or sepsis  B/c drawn  Picc in place and c/d  Culture of wound pending.  [facility] consulted.  Continue ancef for now, since this occurred on abx will add vanc until further instructions from martir HOPKINS  5/25:  CT chest with contrast concerning for abscess and septic arthritis at right clavicular sternal joint. LNS noted and fluid collection below joint  noted. Dr. Donato reviewed, needs OR I&D in a.m.  patient and friend aware of findings.    Will need OR cultures.  5/26:  ID consult appreciated with prior MSSA infection, now with corynebacterium striatum growing from wound, pending deep OR cultures and bone culture. Underwent OR I&D of sternoclavicular joint and clavicle bone this a.m  tolerated well.  Change from ancef and vancomycin IV to daptomycin IV daily x 6 weeks.    CPK weekly.      Acute osteomyelitis of clavicle, right (HCC)- (present on admission)  Assessment & Plan  5/26:  s/p OR bone biopsy and deep cultures, s/p OR wash out.  Corynebacterium striatum, changed ancef and vancomycin to daptomycin IV daily.  ID and ortho appreciated.    Pleural effusion on right- (present on admission)  Assessment & Plan  Will likely need IR thoracentesis.   Plan to start lasix post op.  Check echo.    Chronic deep vein thrombosis (DVT) of right upper extremity (HCC)- (present on admission)  Assessment & Plan  Sub acute  Continue eliquis    Leukopenia- (present on admission)  Assessment & Plan  Mild  following    Diabetic ulcer of foot, limited to breakdown of skin ([facility])- (present on admission)  Assessment & Plan  R foot  Present on admission  Wound care  following    Hypoxia- (present on admission)  Assessment & Plan  Subacute  Query jasmeet due to body habitus, sleep study recommended as outpatient   IS  RT  Following  Stable on recent baseline of 2L    DM (diabetes mellitus) ([facility])- (present on admission)  Assessment & Plan  No dka  Diabetic diet  Continue long acting insulin and SSI  Hypoglycemic protocol  following         VTE prophylaxis:    therapeutic anticoagulation with eliquis 5 mg BID      I have performed a physical exam and reviewed and updated ROS and Plan today (5/27/2024). In review of yesterday's note (5/26/2024), there are no changes except as documented above.

## 2024-05-28 NOTE — CARE PLAN
Problem: Knowledge Deficit - Standard  Goal: Patient and family/care givers will demonstrate understanding of plan of care, disease process/condition, diagnostic tests and medications  Outcome: Progressing     The patient is Stable - Low risk of patient condition declining or worsening    Shift Goals  Clinical Goals: ABX therapy, wound care  Patient Goals: rest  Family Goals: maria fernanda    Progress made toward(s) clinical / shift goals:  patient compliant with cares.     Patient is not progressing towards the following goals:

## 2024-05-29 VITALS
SYSTOLIC BLOOD PRESSURE: 148 MMHG | WEIGHT: 249.12 LBS | BODY MASS INDEX: 33.74 KG/M2 | DIASTOLIC BLOOD PRESSURE: 72 MMHG | TEMPERATURE: 97 F | OXYGEN SATURATION: 98 % | HEIGHT: 72 IN | RESPIRATION RATE: 18 BRPM | HEART RATE: 72 BPM

## 2024-05-29 LAB
ANION GAP SERPL CALC-SCNC: 8 MMOL/L (ref 7–16)
BACTERIA BLD CULT: NORMAL
BACTERIA BLD CULT: NORMAL
BACTERIA TISS AEROBE CULT: NORMAL
BASOPHILS # BLD AUTO: 0.7 % (ref 0–1.8)
BASOPHILS # BLD: 0.03 K/UL (ref 0–0.12)
BUN SERPL-MCNC: 9 MG/DL (ref 8–22)
CALCIUM SERPL-MCNC: 8.9 MG/DL (ref 8.5–10.5)
CHLORIDE SERPL-SCNC: 105 MMOL/L (ref 96–112)
CO2 SERPL-SCNC: 27 MMOL/L (ref 20–33)
CREAT SERPL-MCNC: 0.49 MG/DL (ref 0.5–1.4)
EOSINOPHIL # BLD AUTO: 0.27 K/UL (ref 0–0.51)
EOSINOPHIL NFR BLD: 6.2 % (ref 0–6.9)
ERYTHROCYTE [DISTWIDTH] IN BLOOD BY AUTOMATED COUNT: 42.1 FL (ref 35.9–50)
GFR SERPLBLD CREATININE-BSD FMLA CKD-EPI: 117 ML/MIN/1.73 M 2
GLUCOSE BLD STRIP.AUTO-MCNC: 138 MG/DL (ref 65–99)
GLUCOSE BLD STRIP.AUTO-MCNC: 152 MG/DL (ref 65–99)
GLUCOSE SERPL-MCNC: 119 MG/DL (ref 65–99)
GRAM STN SPEC: NORMAL
HCT VFR BLD AUTO: 30.1 % (ref 42–52)
HGB BLD-MCNC: 9.7 G/DL (ref 14–18)
IMM GRANULOCYTES # BLD AUTO: 0.01 K/UL (ref 0–0.11)
IMM GRANULOCYTES NFR BLD AUTO: 0.2 % (ref 0–0.9)
LYMPHOCYTES # BLD AUTO: 1.71 K/UL (ref 1–4.8)
LYMPHOCYTES NFR BLD: 39.4 % (ref 22–41)
MCH RBC QN AUTO: 27.6 PG (ref 27–33)
MCHC RBC AUTO-ENTMCNC: 32.2 G/DL (ref 32.3–36.5)
MCV RBC AUTO: 85.8 FL (ref 81.4–97.8)
MONOCYTES # BLD AUTO: 0.55 K/UL (ref 0–0.85)
MONOCYTES NFR BLD AUTO: 12.7 % (ref 0–13.4)
NEUTROPHILS # BLD AUTO: 1.77 K/UL (ref 1.82–7.42)
NEUTROPHILS NFR BLD: 40.8 % (ref 44–72)
NRBC # BLD AUTO: 0 K/UL
NRBC BLD-RTO: 0 /100 WBC (ref 0–0.2)
PLATELET # BLD AUTO: 366 K/UL (ref 164–446)
PMV BLD AUTO: 9.2 FL (ref 9–12.9)
POTASSIUM SERPL-SCNC: 3.9 MMOL/L (ref 3.6–5.5)
RBC # BLD AUTO: 3.51 M/UL (ref 4.7–6.1)
SIGNIFICANT IND 70042: NORMAL
SITE SITE: NORMAL
SODIUM SERPL-SCNC: 140 MMOL/L (ref 135–145)
SOURCE SOURCE: NORMAL
WBC # BLD AUTO: 4.3 K/UL (ref 4.8–10.8)

## 2024-05-29 PROCEDURE — 99239 HOSP IP/OBS DSCHRG MGMT >30: CPT | Performed by: INTERNAL MEDICINE

## 2024-05-29 PROCEDURE — A9270 NON-COVERED ITEM OR SERVICE: HCPCS | Performed by: HOSPITALIST

## 2024-05-29 PROCEDURE — 700102 HCHG RX REV CODE 250 W/ 637 OVERRIDE(OP): Performed by: HOSPITALIST

## 2024-05-29 PROCEDURE — 82962 GLUCOSE BLOOD TEST: CPT

## 2024-05-29 PROCEDURE — 700111 HCHG RX REV CODE 636 W/ 250 OVERRIDE (IP): Mod: JZ | Performed by: INTERNAL MEDICINE

## 2024-05-29 PROCEDURE — 700105 HCHG RX REV CODE 258: Performed by: INTERNAL MEDICINE

## 2024-05-29 PROCEDURE — 700111 HCHG RX REV CODE 636 W/ 250 OVERRIDE (IP): Performed by: HOSPITALIST

## 2024-05-29 PROCEDURE — 85025 COMPLETE CBC W/AUTO DIFF WBC: CPT

## 2024-05-29 PROCEDURE — 80048 BASIC METABOLIC PNL TOTAL CA: CPT

## 2024-05-29 RX ORDER — CARVEDILOL 25 MG/1
25 TABLET ORAL 2 TIMES DAILY WITH MEALS
Qty: 60 TABLET | Refills: 0
Start: 2024-05-29

## 2024-05-29 RX ADMIN — APIXABAN 5 MG: 5 TABLET, FILM COATED ORAL at 06:03

## 2024-05-29 RX ADMIN — POTASSIUM CHLORIDE 40 MEQ: 1500 TABLET, EXTENDED RELEASE ORAL at 06:03

## 2024-05-29 RX ADMIN — DAPTOMYCIN 700 MG: 500 INJECTION, POWDER, LYOPHILIZED, FOR SOLUTION INTRAVENOUS at 08:46

## 2024-05-29 RX ADMIN — FUROSEMIDE 20 MG: 10 INJECTION INTRAMUSCULAR; INTRAVENOUS at 06:05

## 2024-05-29 RX ADMIN — LISINOPRIL 40 MG: 20 TABLET ORAL at 06:03

## 2024-05-29 RX ADMIN — CARVEDILOL 25 MG: 25 TABLET, FILM COATED ORAL at 08:50

## 2024-05-29 RX ADMIN — AMLODIPINE BESYLATE 5 MG: 5 TABLET ORAL at 06:02

## 2024-05-29 ASSESSMENT — ENCOUNTER SYMPTOMS: SHORTNESS OF BREATH: 0

## 2024-05-29 ASSESSMENT — PAIN DESCRIPTION - PAIN TYPE: TYPE: ACUTE PAIN

## 2024-05-29 NOTE — CARE PLAN
Problem: Pain - Standard  Goal: Alleviation of pain or a reduction in pain to the patient’s comfort goal  Outcome: Progressing     The patient is Stable - Low risk of patient condition declining or worsening    Shift Goals  Clinical Goals: ABX therapy, monitor drain  Patient Goals: rest; glucose control  Family Goals: maria fernanda    Progress made toward(s) clinical / shift goals:  continue with IV ABX, tolerating well. Surgical wound clean and dry.     Patient is not progressing towards the following goals:

## 2024-05-29 NOTE — DISCHARGE SUMMARY
Discharge Summary    CHIEF COMPLAINT ON ADMISSION  Chief Complaint   Patient presents with    Sent by MD     Pt was sent by  today for further evaluation of leaking wound near clavicle. (Hx of sepsis 8wks ago)   Pt also noted wound to R foot. Hx diabetes       GLF     Today while walking out of  pt tripped on uneven floor. . R shoulder pain. -HS +thinners -LOC         Reason for Admission  Sent By MD     Admission Date  5/24/2024    CODE STATUS  Full Code    HPI & HOSPITAL COURSE  This is a 59 y.o. 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  He was admitted to our hospital and initiated on IV daptomycin and outpatient IV vancomycin was stopped.  Additionally cefazolin was stopped.  Orthopedic surgery was consulted.  Patient underwent arthrotomy and drainage of the right sternoclavicular joint and partial saucerization of the medial right clavicle.  This was done on May 26, 2024 without any intraoperative or postoperative complications.  All of his intraoperative cultures remain no growth to date.  His superficial culture is growing continued Corynebacterium striated and.  He was transition to once daily dosing of daptomycin and will follow-up with [facility] for ongoing treatment and follow-up of finalization of intraoperative cultures.  He was deemed stable for discharge.    Therefore, he is discharged in good and stable condition to home with close outpatient follow-up.    The patient met 2-midnight criteria for an inpatient stay at the time of discharge.    Discharge Date  5/29/2024    FOLLOW UP ITEMS POST DISCHARGE  Follow-up with [facility] for ongoing treatment    DISCHARGE DIAGNOSES  Principal Problem:    Septic arthritis involving sternum (HCC) (POA: Yes)  Active Problems:    DM  (diabetes mellitus) (HCC) (POA: Yes)    Hypoxia (POA: Yes)    Diabetic ulcer of foot, limited to breakdown of skin (HCC) (POA: Yes)    Leukopenia (POA: Yes)    Chronic deep vein thrombosis (DVT) of right upper extremity (HCC) (POA: Yes)    Pleural effusion on right (POA: Yes)    Acute osteomyelitis of clavicle, right (HCC) (POA: Yes)  Resolved Problems:    * No resolved hospital problems. *      FOLLOW UP  Future Appointments   Date Time Provider Department Center   6/5/2024  4:20 PM Brisa Cho M.D. [facility]     No follow-up provider specified.    MEDICATIONS ON DISCHARGE     Medication List        START taking these medications        Instructions   NS SOLN 50 mL with DAPTOmycin 500 MG SOLR 700 mg   Doctor's comments: Per ID duration  Infuse 700 mg into a venous catheter every day.  Dose: 700 mg            CHANGE how you take these medications        Instructions   carvedilol 25 MG Tabs  What changed:   medication strength  how much to take  when to take this  Commonly known as: Coreg   Take 1 Tablet by mouth 2 times a day with meals.  Dose: 25 mg            CONTINUE taking these medications        Instructions   acetaminophen 500 MG Tabs  Commonly known as: Tylenol   Take 1,000 mg by mouth 2 times a day as needed for Mild Pain. 2 tablets = 1,000 mg.  Dose: 1,000 mg     amLODIPine 5 MG Tabs  Commonly known as: Norvasc   Take 5 mg by mouth 2 times a day.  Dose: 5 mg     Eliquis 5 MG Tabs  Generic drug: apixaban   Take 5 mg by mouth 2 times a day.  Dose: 5 mg     furosemide 40 MG Tabs  Commonly known as: Lasix   Take 40 mg by mouth every day.  Dose: 40 mg     Lantus SoloStar 100 UNIT/ML Sopn injection  Generic drug: insulin glargine   Inject 25 Units under the skin every evening.  Dose: 25 Units     lisinopril 40 MG tablet  Commonly known as: Prinivil   Take 1 Tablet by mouth every day.  Dose: 40 mg     NovoLOG FlexPen 100 UNIT/ML solution for injection  Generic drug: insulin aspart   Inject 1-10 Units under  the skin 4 Times a Day,Before Meals and at Bedtime. Inject as directed per sliding scale. Unspecified sliding scale.  Dose: 1-10 Units     oxyCODONE-acetaminophen 5-325 MG Tabs  Commonly known as: Percocet   Take 0.5-1 Tablets by mouth every four hours as needed for Moderate Pain or Severe Pain.  Dose: 0.5-1 Tablet     potassium chloride ER 10 MEQ tablet  Commonly known as: Klor-Con   Take 10 mEq by mouth every day.  Dose: 10 mEq            STOP taking these medications      ceFAZolin 1 g/10mL  Commonly known as: Ancef     hydroCHLOROthiazide 25 MG Tabs              Allergies  No Known Allergies    DIET  Orders Placed This Encounter   Procedures    Diet Order Diet: Consistent CHO (Diabetic)     Standing Status:   Standing     Number of Occurrences:   1     Order Specific Question:   Diet:     Answer:   Consistent CHO (Diabetic) [4]       ACTIVITY  As tolerated.  Weight bearing as tolerated    CONSULTATIONS  Orthopedic surgery and infectious disease    PROCEDURES  Orthopedic procedures outlined above    EC-ECHOCARDIOGRAM COMPLETE W/ CONT   Final Result      CT-CHEST (THORAX) WITH   Final Result      1.  There are changes involving the medial head of the right clavicle and sternoclavicular joint consistent with osteomyelitis/septic arthritis. There is a small amount of surrounding fluid with ill-defined phlegmonous change which could be a small    abscess.   2.  There are nonspecific superior mediastinal nodes, probably reactive inflammatory nodes.   3.  There is a moderate dependent right and a small dependent left pleural effusion.   4.  Bibasilar airspace disease is most consistent with atelectasis. No pneumonia or pneumothorax.      Fleischner Society pulmonary nodule recommendations:   Not Applicable         DX-CLAVICLE RIGHT   Final Result      No fracture detected.      CT-HEAD W/O   Final Result      Head CT without contrast within normal limits. No evidence of acute cerebral infarction, hemorrhage or mass  lesion.         DX-FOOT-COMPLETE 3+ RIGHT   Final Result      1.  No acute osseous abnormality. If symptoms persist, follow-up radiographs can be obtained in 7-10 days.   2.  Plantar calcaneal enthesophyte.      DX-CHEST-PORTABLE (1 VIEW)   Final Result      1. Right lung basilar opacity with moderate right pleural effusion, concerning for pneumonia or atelectasis.   2. Appropriate position of the left upper extremity PIC catheter.   3. Normal cardiomediastinal silhouette size.            LABORATORY  Lab Results   Component Value Date    SODIUM 140 05/29/2024    POTASSIUM 3.9 05/29/2024    CHLORIDE 105 05/29/2024    CO2 27 05/29/2024    GLUCOSE 119 (H) 05/29/2024    BUN 9 05/29/2024    CREATININE 0.49 (L) 05/29/2024        Lab Results   Component Value Date    WBC 4.3 (L) 05/29/2024    HEMOGLOBIN 9.7 (L) 05/29/2024    HEMATOCRIT 30.1 (L) 05/29/2024    PLATELETCT 366 05/29/2024        Total time of the discharge process exceeds 43 minutes.

## 2024-05-29 NOTE — DISCHARGE PLANNING
Case Management Discharge Planning    Admission Date: 5/24/2024  GMLOS: 3.4  ALOS: 5    6-Clicks ADL Score: 24  6-Clicks Mobility Score: 22      Anticipated Discharge Dispo: Discharge Disposition: D/T to home under A care in anticipation of covered skilled care (06)  Discharge Address: [address] [address]  Discharge Contact Phone Number: [phone]    DME Needed: No    Action(s) Taken: Referral(s) sent and Acceptance Received    Pt has been accepted by [facility] and will resume home oxygen thru preferred, pt friend will bring oxygen tank for discharge home.     Pt has been accepted by [facility] and they will assist with IV abx for 6 weeks starting with an infusion scheduled for 5/30 at 1030.    Escalations Completed: None    Medically Clear: Yes    Next Steps: This RN CM to assist with any barriers for discharge    Barriers to Discharge: None    Is the patient up for discharge tomorrow: No Pt friend will assist with ride home

## 2024-05-29 NOTE — PROGRESS NOTES
0700- Assumed care of pt. Alert/oriented. Denies needs and pain.    0830- ABX & am  meds given. BS checked = 138    0845- MD bedside. Pt has orders and is cleared to discharge to day. Pt states he has a ride and that person will be bringing his oxygen tank. Pt will have home health and will discharge with PICC line and will continue ABX therapy/infusion.    1220- Hospitalist approved for pt to discharge.    1225- BS = 152; declined insulin s/s.

## 2024-05-29 NOTE — CARE PLAN
The patient is Stable - Low risk of patient condition declining or worsening    Shift Goals  Clinical Goals: Pt to remain free from fall and injury during shift.  Patient Goals: Discharge by noon.  Family Goals: GABE    Progress made toward(s) clinical / shift goals:  Remained free from fall and injury during shift.

## 2024-05-29 NOTE — PROGRESS NOTES
Discharge instructions reviewed and signed, all questions answered, PICC in place for home infusion, no new prescriptions.

## 2024-05-30 LAB
FUNGUS SPEC CULT: NORMAL
FUNGUS SPEC FUNGUS STN: NORMAL
SIGNIFICANT IND 70042: NORMAL
SITE SITE: NORMAL
SOURCE SOURCE: NORMAL

## 2024-06-01 LAB
BACTERIA SPEC ANAEROBE CULT: NORMAL
SIGNIFICANT IND 70042: NORMAL
SITE SITE: NORMAL
SOURCE SOURCE: NORMAL

## 2024-06-03 ENCOUNTER — HOSPITAL ENCOUNTER (OUTPATIENT)
Facility: MEDICAL CENTER | Age: 59
End: 2024-06-03
Attending: INTERNAL MEDICINE
Payer: COMMERCIAL

## 2024-06-03 LAB
ALBUMIN SERPL BCP-MCNC: 4.1 G/DL (ref 3.2–4.9)
ALBUMIN/GLOB SERPL: 1.6 G/DL
ALP SERPL-CCNC: 78 U/L (ref 30–99)
ALT SERPL-CCNC: 7 U/L (ref 2–50)
ANION GAP SERPL CALC-SCNC: 12 MMOL/L (ref 7–16)
AST SERPL-CCNC: 14 U/L (ref 12–45)
BASOPHILS # BLD AUTO: 0.8 % (ref 0–1.8)
BASOPHILS # BLD: 0.03 K/UL (ref 0–0.12)
BILIRUB SERPL-MCNC: 0.3 MG/DL (ref 0.1–1.5)
BUN SERPL-MCNC: 12 MG/DL (ref 8–22)
CALCIUM ALBUM COR SERPL-MCNC: 8.8 MG/DL (ref 8.5–10.5)
CALCIUM SERPL-MCNC: 8.9 MG/DL (ref 8.5–10.5)
CHLORIDE SERPL-SCNC: 104 MMOL/L (ref 96–112)
CK SERPL-CCNC: 85 U/L (ref 0–154)
CO2 SERPL-SCNC: 23 MMOL/L (ref 20–33)
CREAT SERPL-MCNC: 0.55 MG/DL (ref 0.5–1.4)
CRP SERPL HS-MCNC: 0.74 MG/DL (ref 0–0.75)
EOSINOPHIL # BLD AUTO: 0.27 K/UL (ref 0–0.51)
EOSINOPHIL NFR BLD: 7 % (ref 0–6.9)
ERYTHROCYTE [DISTWIDTH] IN BLOOD BY AUTOMATED COUNT: 42.9 FL (ref 35.9–50)
ERYTHROCYTE [SEDIMENTATION RATE] IN BLOOD BY WESTERGREN METHOD: 35 MM/HOUR (ref 0–20)
GFR SERPLBLD CREATININE-BSD FMLA CKD-EPI: 113 ML/MIN/1.73 M 2
GLOBULIN SER CALC-MCNC: 2.6 G/DL (ref 1.9–3.5)
GLUCOSE SERPL-MCNC: 157 MG/DL (ref 65–99)
HCT VFR BLD AUTO: 32.7 % (ref 42–52)
HGB BLD-MCNC: 10.4 G/DL (ref 14–18)
IMM GRANULOCYTES # BLD AUTO: 0.01 K/UL (ref 0–0.11)
IMM GRANULOCYTES NFR BLD AUTO: 0.3 % (ref 0–0.9)
LYMPHOCYTES # BLD AUTO: 1.38 K/UL (ref 1–4.8)
LYMPHOCYTES NFR BLD: 35.9 % (ref 22–41)
MCH RBC QN AUTO: 27.2 PG (ref 27–33)
MCHC RBC AUTO-ENTMCNC: 31.8 G/DL (ref 32.3–36.5)
MCV RBC AUTO: 85.4 FL (ref 81.4–97.8)
MONOCYTES # BLD AUTO: 0.43 K/UL (ref 0–0.85)
MONOCYTES NFR BLD AUTO: 11.2 % (ref 0–13.4)
NEUTROPHILS # BLD AUTO: 1.72 K/UL (ref 1.82–7.42)
NEUTROPHILS NFR BLD: 44.8 % (ref 44–72)
NRBC # BLD AUTO: 0 K/UL
NRBC BLD-RTO: 0 /100 WBC (ref 0–0.2)
PLATELET # BLD AUTO: 367 K/UL (ref 164–446)
PMV BLD AUTO: 10.7 FL (ref 9–12.9)
POTASSIUM SERPL-SCNC: 4.1 MMOL/L (ref 3.6–5.5)
PROT SERPL-MCNC: 6.7 G/DL (ref 6–8.2)
RBC # BLD AUTO: 3.83 M/UL (ref 4.7–6.1)
SODIUM SERPL-SCNC: 139 MMOL/L (ref 135–145)
WBC # BLD AUTO: 3.8 K/UL (ref 4.8–10.8)

## 2024-06-03 PROCEDURE — 82550 ASSAY OF CK (CPK): CPT

## 2024-06-03 PROCEDURE — 86140 C-REACTIVE PROTEIN: CPT

## 2024-06-03 PROCEDURE — 85025 COMPLETE CBC W/AUTO DIFF WBC: CPT

## 2024-06-03 PROCEDURE — 80053 COMPREHEN METABOLIC PANEL: CPT

## 2024-06-03 PROCEDURE — 85652 RBC SED RATE AUTOMATED: CPT

## 2024-06-05 ENCOUNTER — OFFICE VISIT (OUTPATIENT)
Dept: MEDICAL GROUP | Facility: PHYSICIAN GROUP | Age: 59
End: 2024-06-05
Payer: COMMERCIAL

## 2024-06-05 VITALS
HEIGHT: 72 IN | BODY MASS INDEX: 28.36 KG/M2 | SYSTOLIC BLOOD PRESSURE: 124 MMHG | WEIGHT: 209.4 LBS | TEMPERATURE: 97.5 F | RESPIRATION RATE: 18 BRPM | HEART RATE: 89 BPM | OXYGEN SATURATION: 97 % | DIASTOLIC BLOOD PRESSURE: 66 MMHG

## 2024-06-05 DIAGNOSIS — Z79.4 TYPE 2 DIABETES MELLITUS WITH OTHER SPECIFIED COMPLICATION, WITH LONG-TERM CURRENT USE OF INSULIN (HCC): ICD-10-CM

## 2024-06-05 DIAGNOSIS — G89.18 PAIN AT SURGICAL SITE: ICD-10-CM

## 2024-06-05 DIAGNOSIS — L97.412 DIABETIC ULCER OF RIGHT MIDFOOT ASSOCIATED WITH TYPE 2 DIABETES MELLITUS, WITH FAT LAYER EXPOSED (HCC): ICD-10-CM

## 2024-06-05 DIAGNOSIS — Z09 HOSPITAL DISCHARGE FOLLOW-UP: ICD-10-CM

## 2024-06-05 DIAGNOSIS — E11.621 DIABETIC ULCER OF RIGHT MIDFOOT ASSOCIATED WITH TYPE 2 DIABETES MELLITUS, WITH FAT LAYER EXPOSED (HCC): ICD-10-CM

## 2024-06-05 DIAGNOSIS — E11.69 TYPE 2 DIABETES MELLITUS WITH OTHER SPECIFIED COMPLICATION, WITH LONG-TERM CURRENT USE OF INSULIN (HCC): ICD-10-CM

## 2024-06-05 PROBLEM — R73.01 IMPAIRED FASTING GLUCOSE: Status: RESOLVED | Noted: 2021-05-13 | Resolved: 2024-06-05

## 2024-06-05 RX ORDER — GLUCOSAMINE HCL/CHONDROITIN SU 500-400 MG
CAPSULE ORAL
Qty: 300 EACH | Refills: 4 | Status: SHIPPED | OUTPATIENT
Start: 2024-06-05

## 2024-06-05 RX ORDER — INSULIN GLARGINE 100 [IU]/ML
25 INJECTION, SOLUTION SUBCUTANEOUS EVERY EVENING
Qty: 30 ML | Refills: 0 | Status: SHIPPED | OUTPATIENT
Start: 2024-06-05 | End: 2024-09-03

## 2024-06-05 RX ORDER — LANCETS 30 GAUGE
EACH MISCELLANEOUS
Qty: 300 EACH | Refills: 4 | Status: SHIPPED | OUTPATIENT
Start: 2024-06-05

## 2024-06-05 RX ORDER — OXYCODONE HYDROCHLORIDE AND ACETAMINOPHEN 5; 325 MG/1; MG/1
0.5 TABLET ORAL
Qty: 15 TABLET | Refills: 0 | Status: SHIPPED | OUTPATIENT
Start: 2024-06-05 | End: 2024-07-05

## 2024-06-05 ASSESSMENT — FIBROSIS 4 INDEX: FIB4 SCORE: 0.88

## 2024-06-05 NOTE — ASSESSMENT & PLAN NOTE
"From recent hospital notes:   \"HPI & HOSPITAL COURSE  This is a 59 y.o. 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  He was admitted to our hospital and initiated on IV daptomycin and outpatient IV vancomycin was stopped.  Additionally cefazolin was stopped.  Orthopedic surgery was consulted.  Patient underwent arthrotomy and drainage of the right sternoclavicular joint and partial saucerization of the medial right clavicle.  This was done on May 26, 2024 without any intraoperative or postoperative complications.  All of his intraoperative cultures remain no growth to date.  His superficial culture is growing continued Corynebacterium striated and.  He was transition to once daily dosing of daptomycin and will follow-up with [facility] infectious [facility] group for ongoing treatment and follow-up of finalization of intraoperative cultures.  He was deemed stable for discharge.     Therefore, he is discharged in good and stable condition to home with close outpatient follow-up\"    Today in clinic: he has picc line in place in left arm, followed with weekly labs by ID  He feels well. Has no fevers/chills  Pain is controllled with percocet 5/325 1/2 to one tablet a day if needed. Refill provided.  reviewed, advised on risks.     He is on oxygen 2L with exertion, encouraged to use spirometer and gradually wean down keep oxygen above 88-90. He uses a home pulse ox.     Blood sugars are in range of 80-140s on lantus 25 units, has normal renal function, can start metformin 500mg and dec lantus gradually. A1c at recent hospital visit 6.5    Right foot dime sized ulcer on the lateral edge of his foot. Was caused by his too short hospital bed. Has home health nurse doing dressing changes with medihoney.   Dressing changed for him in clinic " today, old dressing removed, cleaned with alcohol wipe and new dressing with triple antibiotic with clean bandaid.     Ref to podiatry provided for custom shoes.     Refill of insulin, glucometer, test strip rx provided.     Follow up in 3 months with repeat labs.

## 2024-06-06 NOTE — PROGRESS NOTES
"Subjective:   Chuy Moody is a 59 y.o. male here today for evaluation and management of:     Hospital discharge follow-up  From recent hospital notes:   \"HPI & HOSPITAL COURSE  This is a 59 y.o. 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  He was admitted to our hospital and initiated on IV daptomycin and outpatient IV vancomycin was stopped.  Additionally cefazolin was stopped.  Orthopedic surgery was consulted.  Patient underwent arthrotomy and drainage of the right sternoclavicular joint and partial saucerization of the medial right clavicle.  This was done on May 26, 2024 without any intraoperative or postoperative complications.  All of his intraoperative cultures remain no growth to date.  His superficial culture is growing continued Corynebacterium striated and.  He was transition to once daily dosing of daptomycin and will follow-up with [facility] infectious disease group for ongoing treatment and follow-up of finalization of intraoperative cultures.  He was deemed stable for discharge.     Therefore, he is discharged in good and stable condition to home with close outpatient follow-up\"    Today in clinic: he has picc line in place in left arm, followed with weekly labs by ID  He feels well. Has no fevers/chills  Pain is controllled with percocet 5/325 1/2 to one tablet a day if needed. Refill provided.  reviewed, advised on risks.     He is on oxygen 2L with exertion, encouraged to use spirometer and gradually wean down keep oxygen above 88-90. He uses a home pulse ox.     Blood sugars are in range of 80-140s on lantus 25 units, has normal renal function, can start metformin 500mg and dec lantus gradually. A1c at recent hospital visit 6.5    Right foot dime sized ulcer on the lateral edge of his foot. Was caused by his " too short hospital bed. Has home health nurse doing dressing changes with medihoney.   Dressing changed for him in clinic today, old dressing removed, cleaned with alcohol wipe and new dressing with triple antibiotic with clean bandaid.     Ref to podiatry provided for custom shoes.     Refill of insulin, glucometer, test strip rx provided.     Follow up in 3 months with repeat labs.          Current medicines (including changes today)  Current Outpatient Medications   Medication Sig Dispense Refill    Blood Glucose Meter Kit Test blood sugar as recommended by provider. Test 4 times a day. blood glucose monitoring kit. 1 Kit 0    Blood Glucose Test Strips Use one one strip to test blood sugar four times daily. 300 Strip 4    Lancets Use one lancet to test blood sugar four times daily. 300 Each 4    Alcohol Swabs Wipe site with prep pad prior to injection. 300 Each 4    Insulin Pen Needle 32 G x 4 mm Use one pen needle in pen device to inject insulin four times daily. 300 Each 4    oxyCODONE-acetaminophen (PERCOCET) 5-325 MG Tab Take 0.5 Tablets by mouth 1 time a day as needed for Moderate Pain or Severe Pain for up to 30 days. 15 Tablet 0    LANTUS SOLOSTAR 100 UNIT/ML Solution Pen-injector injection Inject 25 Units under the skin every evening for 90 days. 30 mL 0    metFORMIN (GLUCOPHAGE) 500 MG Tab Take 1 Tablet by mouth 2 times a day with meals. 180 Tablet 1    carvedilol (COREG) 25 MG Tab Take 1 Tablet by mouth 2 times a day with meals. 60 Tablet 0    NS SOLN 50 mL with DAPTOmycin 500 MG SOLR 700 mg Infuse 700 mg into a venous catheter every day. 1 Application 0    amLODIPine (NORVASC) 5 MG Tab Take 5 mg by mouth 2 times a day.      ELIQUIS 5 MG Tab Take 5 mg by mouth 2 times a day.      furosemide (LASIX) 40 MG Tab Take 40 mg by mouth every day.      NOVOLOG FLEXPEN 100 UNIT/ML solution for injection Inject 1-10 Units under the skin 4 Times a Day,Before Meals and at Bedtime. Inject as directed per sliding  scale. Unspecified sliding scale.      potassium chloride ER (KLOR-CON) 10 MEQ tablet Take 10 mEq by mouth every day.      acetaminophen (TYLENOL) 500 MG Tab Take 1,000 mg by mouth 2 times a day as needed for Mild Pain. 2 tablets = 1,000 mg.      lisinopril (PRINIVIL) 40 MG tablet Take 1 Tablet by mouth every day. 90 Tablet 3     No current facility-administered medications for this visit.     He  has no past medical history on file.    ROS  No chest pain, no shortness of breath, no abdominal pain       Objective:     /66 (BP Location: Left arm, Patient Position: Sitting, BP Cuff Size: Small adult)   Pulse 89   Temp 36.4 °C (97.5 °F) (Temporal)   Resp 18   Ht 1.829 m (6')   Wt 95 kg (209 lb 6.4 oz)   SpO2 97%  Body mass index is 28.4 kg/m².   Physical Exam:  Constitutional: Alert, no distress, well-groomed.  Skin: dime sized ulcer on right foot to fat layer.   Eye: Round. Conjunctiva clear, lids normal. No icterus.   ENMT: Lips pink without lesions, good dentition, moist mucous membranes. Phonation normal.  Neck: No masses, no thyromegaly. Moves freely without pain.  Respiratory: Unlabored respiratory effort, no cough or audible wheeze  Psych: Alert and oriented x3, normal affect and mood.      Assessment and Plan:   The following treatment plan was discussed    1. Hospital discharge follow-up    2. Pain at surgical site  - oxyCODONE-acetaminophen (PERCOCET) 5-325 MG Tab; Take 0.5 Tablets by mouth 1 time a day as needed for Moderate Pain or Severe Pain for up to 30 days.  Dispense: 15 Tablet; Refill: 0    3. 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 Type 2 diabetes mellitus with other specified complication, with long-term current use of insulin (HCC)  - Referral to Podiatry  - Comp Metabolic Panel; Future  - HEMOGLOBIN A1C; Future  - Lipid Profile; Future  - MICROALBUMIN CREAT RATIO URINE; Future    Other  orders  - Blood Glucose Meter Kit; Test blood sugar as recommended by provider. Test 4 times a day. blood glucose monitoring kit.  Dispense: 1 Kit; Refill: 0  - Blood Glucose Test Strips; Use one one strip to test blood sugar four times daily.  Dispense: 300 Strip; Refill: 4  - Lancets; Use one lancet to test blood sugar four times daily.  Dispense: 300 Each; Refill: 4  - Alcohol Swabs; Wipe site with prep pad prior to injection.  Dispense: 300 Each; Refill: 4  - Insulin Pen Needle 32 G x 4 mm; Use one pen needle in pen device to inject insulin four times daily.  Dispense: 300 Each; Refill: 4  - LANTUS SOLOSTAR 100 UNIT/ML Solution Pen-injector injection; Inject 25 Units under the skin every evening for 90 days.  Dispense: 30 mL; Refill: 0  - metFORMIN (GLUCOPHAGE) 500 MG Tab; Take 1 Tablet by mouth 2 times a day with meals.  Dispense: 180 Tablet; Refill: 1      Followup: No follow-ups on file.

## 2024-06-10 ENCOUNTER — HOSPITAL ENCOUNTER (OUTPATIENT)
Facility: MEDICAL CENTER | Age: 59
End: 2024-06-10
Attending: INTERNAL MEDICINE
Payer: COMMERCIAL

## 2024-06-10 LAB
ALBUMIN SERPL BCP-MCNC: 4.1 G/DL (ref 3.2–4.9)
ALBUMIN/GLOB SERPL: 1.5 G/DL
ALP SERPL-CCNC: 69 U/L (ref 30–99)
ALT SERPL-CCNC: 11 U/L (ref 2–50)
ANION GAP SERPL CALC-SCNC: 12 MMOL/L (ref 7–16)
AST SERPL-CCNC: 16 U/L (ref 12–45)
BASOPHILS # BLD AUTO: 0.9 % (ref 0–1.8)
BASOPHILS # BLD: 0.04 K/UL (ref 0–0.12)
BILIRUB SERPL-MCNC: 0.3 MG/DL (ref 0.1–1.5)
BUN SERPL-MCNC: 11 MG/DL (ref 8–22)
CALCIUM ALBUM COR SERPL-MCNC: 9.2 MG/DL (ref 8.5–10.5)
CALCIUM SERPL-MCNC: 9.3 MG/DL (ref 8.5–10.5)
CHLORIDE SERPL-SCNC: 101 MMOL/L (ref 96–112)
CK SERPL-CCNC: 102 U/L (ref 0–154)
CO2 SERPL-SCNC: 24 MMOL/L (ref 20–33)
CREAT SERPL-MCNC: 0.58 MG/DL (ref 0.5–1.4)
CRP SERPL HS-MCNC: 0.86 MG/DL (ref 0–0.75)
EOSINOPHIL # BLD AUTO: 0.23 K/UL (ref 0–0.51)
EOSINOPHIL NFR BLD: 5.3 % (ref 0–6.9)
ERYTHROCYTE [DISTWIDTH] IN BLOOD BY AUTOMATED COUNT: 42.8 FL (ref 35.9–50)
ERYTHROCYTE [SEDIMENTATION RATE] IN BLOOD BY WESTERGREN METHOD: 80 MM/HOUR (ref 0–20)
GFR SERPLBLD CREATININE-BSD FMLA CKD-EPI: 111 ML/MIN/1.73 M 2
GLOBULIN SER CALC-MCNC: 2.8 G/DL (ref 1.9–3.5)
GLUCOSE SERPL-MCNC: 170 MG/DL (ref 65–99)
HCT VFR BLD AUTO: 34.1 % (ref 42–52)
HGB BLD-MCNC: 10.8 G/DL (ref 14–18)
IMM GRANULOCYTES # BLD AUTO: 0.01 K/UL (ref 0–0.11)
IMM GRANULOCYTES NFR BLD AUTO: 0.2 % (ref 0–0.9)
LYMPHOCYTES # BLD AUTO: 1.44 K/UL (ref 1–4.8)
LYMPHOCYTES NFR BLD: 33 % (ref 22–41)
MCH RBC QN AUTO: 26.9 PG (ref 27–33)
MCHC RBC AUTO-ENTMCNC: 31.7 G/DL (ref 32.3–36.5)
MCV RBC AUTO: 84.8 FL (ref 81.4–97.8)
MONOCYTES # BLD AUTO: 0.52 K/UL (ref 0–0.85)
MONOCYTES NFR BLD AUTO: 11.9 % (ref 0–13.4)
NEUTROPHILS # BLD AUTO: 2.13 K/UL (ref 1.82–7.42)
NEUTROPHILS NFR BLD: 48.7 % (ref 44–72)
NRBC # BLD AUTO: 0 K/UL
NRBC BLD-RTO: 0 /100 WBC (ref 0–0.2)
PLATELET # BLD AUTO: 373 K/UL (ref 164–446)
PMV BLD AUTO: 10.6 FL (ref 9–12.9)
POTASSIUM SERPL-SCNC: 4.1 MMOL/L (ref 3.6–5.5)
PROT SERPL-MCNC: 6.9 G/DL (ref 6–8.2)
RBC # BLD AUTO: 4.02 M/UL (ref 4.7–6.1)
SODIUM SERPL-SCNC: 137 MMOL/L (ref 135–145)
WBC # BLD AUTO: 4.4 K/UL (ref 4.8–10.8)

## 2024-06-10 PROCEDURE — 86140 C-REACTIVE PROTEIN: CPT

## 2024-06-10 PROCEDURE — 85025 COMPLETE CBC W/AUTO DIFF WBC: CPT

## 2024-06-10 PROCEDURE — 80053 COMPREHEN METABOLIC PANEL: CPT

## 2024-06-10 PROCEDURE — 85652 RBC SED RATE AUTOMATED: CPT

## 2024-06-10 PROCEDURE — 82550 ASSAY OF CK (CPK): CPT

## 2024-06-11 ENCOUNTER — TELEPHONE (OUTPATIENT)
Dept: MEDICAL GROUP | Facility: PHYSICIAN GROUP | Age: 59
End: 2024-06-11
Payer: COMMERCIAL

## 2024-06-11 NOTE — TELEPHONE ENCOUNTER
VOICEMAIL  1. Caller Name: Chuy Moody                        Call Back Number: [phone] (home)       2. Message: pt lvm stating that his Rx for Glucose Monitor needs to specifically say one touch    3. Patient approves office to leave a detailed voicemail/MyChart message: N\A

## 2024-06-17 ENCOUNTER — HOSPITAL ENCOUNTER (OUTPATIENT)
Facility: MEDICAL CENTER | Age: 59
End: 2024-06-17
Attending: INTERNAL MEDICINE
Payer: COMMERCIAL

## 2024-06-17 LAB
ALBUMIN SERPL BCP-MCNC: 4.1 G/DL (ref 3.2–4.9)
ALBUMIN/GLOB SERPL: 1.5 G/DL
ALP SERPL-CCNC: 65 U/L (ref 30–99)
ALT SERPL-CCNC: 11 U/L (ref 2–50)
ANION GAP SERPL CALC-SCNC: 13 MMOL/L (ref 7–16)
AST SERPL-CCNC: 15 U/L (ref 12–45)
BASOPHILS # BLD AUTO: 1 % (ref 0–1.8)
BASOPHILS # BLD: 0.06 K/UL (ref 0–0.12)
BILIRUB SERPL-MCNC: 0.2 MG/DL (ref 0.1–1.5)
BUN SERPL-MCNC: 10 MG/DL (ref 8–22)
CALCIUM ALBUM COR SERPL-MCNC: 9.1 MG/DL (ref 8.5–10.5)
CALCIUM SERPL-MCNC: 9.2 MG/DL (ref 8.5–10.5)
CHLORIDE SERPL-SCNC: 102 MMOL/L (ref 96–112)
CK SERPL-CCNC: 102 U/L (ref 0–154)
CO2 SERPL-SCNC: 22 MMOL/L (ref 20–33)
CREAT SERPL-MCNC: 0.54 MG/DL (ref 0.5–1.4)
CRP SERPL HS-MCNC: 1.56 MG/DL (ref 0–0.75)
EOSINOPHIL # BLD AUTO: 0.34 K/UL (ref 0–0.51)
EOSINOPHIL NFR BLD: 5.8 % (ref 0–6.9)
ERYTHROCYTE [DISTWIDTH] IN BLOOD BY AUTOMATED COUNT: 43 FL (ref 35.9–50)
ERYTHROCYTE [SEDIMENTATION RATE] IN BLOOD BY WESTERGREN METHOD: 46 MM/HOUR (ref 0–20)
GFR SERPLBLD CREATININE-BSD FMLA CKD-EPI: 113 ML/MIN/1.73 M 2
GLOBULIN SER CALC-MCNC: 2.7 G/DL (ref 1.9–3.5)
GLUCOSE SERPL-MCNC: 105 MG/DL (ref 65–99)
HCT VFR BLD AUTO: 34.3 % (ref 42–52)
HGB BLD-MCNC: 10.6 G/DL (ref 14–18)
IMM GRANULOCYTES # BLD AUTO: 0.01 K/UL (ref 0–0.11)
IMM GRANULOCYTES NFR BLD AUTO: 0.2 % (ref 0–0.9)
LYMPHOCYTES # BLD AUTO: 1.46 K/UL (ref 1–4.8)
LYMPHOCYTES NFR BLD: 25.1 % (ref 22–41)
MCH RBC QN AUTO: 26.4 PG (ref 27–33)
MCHC RBC AUTO-ENTMCNC: 30.9 G/DL (ref 32.3–36.5)
MCV RBC AUTO: 85.3 FL (ref 81.4–97.8)
MONOCYTES # BLD AUTO: 0.51 K/UL (ref 0–0.85)
MONOCYTES NFR BLD AUTO: 8.8 % (ref 0–13.4)
NEUTROPHILS # BLD AUTO: 3.44 K/UL (ref 1.82–7.42)
NEUTROPHILS NFR BLD: 59.1 % (ref 44–72)
NRBC # BLD AUTO: 0 K/UL
NRBC BLD-RTO: 0 /100 WBC (ref 0–0.2)
PLATELET # BLD AUTO: 398 K/UL (ref 164–446)
PMV BLD AUTO: 10.5 FL (ref 9–12.9)
POTASSIUM SERPL-SCNC: 3.8 MMOL/L (ref 3.6–5.5)
PROT SERPL-MCNC: 6.8 G/DL (ref 6–8.2)
RBC # BLD AUTO: 4.02 M/UL (ref 4.7–6.1)
SODIUM SERPL-SCNC: 137 MMOL/L (ref 135–145)
WBC # BLD AUTO: 5.8 K/UL (ref 4.8–10.8)

## 2024-06-17 PROCEDURE — 85652 RBC SED RATE AUTOMATED: CPT

## 2024-06-17 PROCEDURE — 85025 COMPLETE CBC W/AUTO DIFF WBC: CPT

## 2024-06-17 PROCEDURE — 82550 ASSAY OF CK (CPK): CPT

## 2024-06-17 PROCEDURE — 80053 COMPREHEN METABOLIC PANEL: CPT

## 2024-06-17 PROCEDURE — 86140 C-REACTIVE PROTEIN: CPT

## 2024-06-19 ENCOUNTER — HOSPITAL ENCOUNTER (OUTPATIENT)
Facility: MEDICAL CENTER | Age: 59
End: 2024-06-19
Attending: INTERNAL MEDICINE
Payer: COMMERCIAL

## 2024-06-19 PROCEDURE — 87205 SMEAR GRAM STAIN: CPT

## 2024-06-19 PROCEDURE — 87075 CULTR BACTERIA EXCEPT BLOOD: CPT

## 2024-06-19 PROCEDURE — 87070 CULTURE OTHR SPECIMN AEROBIC: CPT

## 2024-06-20 LAB
GRAM STN SPEC: NORMAL
SIGNIFICANT IND 70042: NORMAL
SITE SITE: NORMAL
SOURCE SOURCE: NORMAL

## 2024-06-21 ENCOUNTER — PATIENT MESSAGE (OUTPATIENT)
Dept: MEDICAL GROUP | Facility: PHYSICIAN GROUP | Age: 59
End: 2024-06-21
Payer: COMMERCIAL

## 2024-06-21 ENCOUNTER — HOSPITAL ENCOUNTER (OUTPATIENT)
Dept: RADIOLOGY | Facility: MEDICAL CENTER | Age: 59
End: 2024-06-21
Attending: INTERNAL MEDICINE
Payer: COMMERCIAL

## 2024-06-21 DIAGNOSIS — I10 ESSENTIAL HYPERTENSION: ICD-10-CM

## 2024-06-21 DIAGNOSIS — M25.519 ARTHRALGIA OF SHOULDER REGION, UNSPECIFIED LATERALITY: ICD-10-CM

## 2024-06-21 PROCEDURE — A9579 GAD-BASE MR CONTRAST NOS,1ML: HCPCS | Performed by: INTERNAL MEDICINE

## 2024-06-21 PROCEDURE — 71552 MRI CHEST W/O & W/DYE: CPT

## 2024-06-21 PROCEDURE — 700117 HCHG RX CONTRAST REV CODE 255: Performed by: INTERNAL MEDICINE

## 2024-06-21 RX ADMIN — GADOTERIDOL 20 ML: 279.3 INJECTION, SOLUTION INTRAVENOUS at 14:21

## 2024-06-21 NOTE — PATIENT COMMUNICATION
Received request via: Pharmacy    Was the patient seen in the last year in this department? Yes    Does the patient have an active prescription (recently filled or refills available) for medication(s) requested? No    [facility]      Does the patient have [facility] Plus and need 100 day supply (blood pressure, diabetes and cholesterol meds only)? Patient does not have SCP

## 2024-06-21 NOTE — TELEPHONE ENCOUNTER
Received request via: Pharmacy    Was the patient seen in the last year in this department? Yes    Does the patient have an active prescription (recently filled or refills available) for medication(s) requested? No    Pharmacy Name: [email or web address]    Does the patient have [facility] Plus and need 100 day supply (blood pressure, diabetes and cholesterol meds only)? Patient does not have SCP

## 2024-06-22 RX ORDER — TIZANIDINE 4 MG/1
TABLET ORAL
Qty: 20 TABLET | Refills: 0 | OUTPATIENT
Start: 2024-06-22

## 2024-06-23 LAB
BACTERIA WND AEROBE CULT: NORMAL
GRAM STN SPEC: NORMAL
SIGNIFICANT IND 70042: NORMAL
SITE SITE: NORMAL
SOURCE SOURCE: NORMAL

## 2024-06-24 ENCOUNTER — HOSPITAL ENCOUNTER (OUTPATIENT)
Facility: MEDICAL CENTER | Age: 59
End: 2024-06-24
Attending: INTERNAL MEDICINE
Payer: COMMERCIAL

## 2024-06-24 LAB
ALBUMIN SERPL BCP-MCNC: 4 G/DL (ref 3.2–4.9)
ALBUMIN/GLOB SERPL: 1.4 G/DL
ALP SERPL-CCNC: 72 U/L (ref 30–99)
ALT SERPL-CCNC: 11 U/L (ref 2–50)
ANION GAP SERPL CALC-SCNC: 14 MMOL/L (ref 7–16)
AST SERPL-CCNC: 13 U/L (ref 12–45)
BASOPHILS # BLD AUTO: 0.6 % (ref 0–1.8)
BASOPHILS # BLD: 0.04 K/UL (ref 0–0.12)
BILIRUB SERPL-MCNC: 0.2 MG/DL (ref 0.1–1.5)
BUN SERPL-MCNC: 10 MG/DL (ref 8–22)
CALCIUM ALBUM COR SERPL-MCNC: 8.9 MG/DL (ref 8.5–10.5)
CALCIUM SERPL-MCNC: 8.9 MG/DL (ref 8.5–10.5)
CHLORIDE SERPL-SCNC: 100 MMOL/L (ref 96–112)
CK SERPL-CCNC: 146 U/L (ref 0–154)
CO2 SERPL-SCNC: 23 MMOL/L (ref 20–33)
CREAT SERPL-MCNC: 0.52 MG/DL (ref 0.5–1.4)
CRP SERPL HS-MCNC: 3.31 MG/DL (ref 0–0.75)
EOSINOPHIL # BLD AUTO: 0.3 K/UL (ref 0–0.51)
EOSINOPHIL NFR BLD: 4.7 % (ref 0–6.9)
ERYTHROCYTE [DISTWIDTH] IN BLOOD BY AUTOMATED COUNT: 41.3 FL (ref 35.9–50)
ERYTHROCYTE [SEDIMENTATION RATE] IN BLOOD BY WESTERGREN METHOD: 63 MM/HOUR (ref 0–20)
GFR SERPLBLD CREATININE-BSD FMLA CKD-EPI: 115 ML/MIN/1.73 M 2
GLOBULIN SER CALC-MCNC: 2.9 G/DL (ref 1.9–3.5)
GLUCOSE SERPL-MCNC: 107 MG/DL (ref 65–99)
HCT VFR BLD AUTO: 34.4 % (ref 42–52)
HGB BLD-MCNC: 11.1 G/DL (ref 14–18)
IMM GRANULOCYTES # BLD AUTO: 0.02 K/UL (ref 0–0.11)
IMM GRANULOCYTES NFR BLD AUTO: 0.3 % (ref 0–0.9)
LYMPHOCYTES # BLD AUTO: 1.62 K/UL (ref 1–4.8)
LYMPHOCYTES NFR BLD: 25.2 % (ref 22–41)
MCH RBC QN AUTO: 26.4 PG (ref 27–33)
MCHC RBC AUTO-ENTMCNC: 32.3 G/DL (ref 32.3–36.5)
MCV RBC AUTO: 81.9 FL (ref 81.4–97.8)
MONOCYTES # BLD AUTO: 0.7 K/UL (ref 0–0.85)
MONOCYTES NFR BLD AUTO: 10.9 % (ref 0–13.4)
NEUTROPHILS # BLD AUTO: 3.76 K/UL (ref 1.82–7.42)
NEUTROPHILS NFR BLD: 58.3 % (ref 44–72)
NRBC # BLD AUTO: 0 K/UL
NRBC BLD-RTO: 0 /100 WBC (ref 0–0.2)
PLATELET # BLD AUTO: 467 K/UL (ref 164–446)
PMV BLD AUTO: 10.2 FL (ref 9–12.9)
POTASSIUM SERPL-SCNC: 3.9 MMOL/L (ref 3.6–5.5)
PROT SERPL-MCNC: 6.9 G/DL (ref 6–8.2)
RBC # BLD AUTO: 4.2 M/UL (ref 4.7–6.1)
SODIUM SERPL-SCNC: 137 MMOL/L (ref 135–145)
WBC # BLD AUTO: 6.4 K/UL (ref 4.8–10.8)

## 2024-06-24 PROCEDURE — 85025 COMPLETE CBC W/AUTO DIFF WBC: CPT

## 2024-06-24 PROCEDURE — 80053 COMPREHEN METABOLIC PANEL: CPT

## 2024-06-24 PROCEDURE — 86140 C-REACTIVE PROTEIN: CPT

## 2024-06-24 PROCEDURE — 82550 ASSAY OF CK (CPK): CPT

## 2024-06-24 PROCEDURE — 85652 RBC SED RATE AUTOMATED: CPT

## 2024-06-24 RX ORDER — FUROSEMIDE 40 MG/1
40 TABLET ORAL DAILY
Qty: 90 TABLET | Refills: 3 | Status: SHIPPED | OUTPATIENT
Start: 2024-06-24

## 2024-06-24 RX ORDER — AMLODIPINE BESYLATE 5 MG/1
5 TABLET ORAL 2 TIMES DAILY
Qty: 60 TABLET | Refills: 3 | Status: SHIPPED | OUTPATIENT
Start: 2024-06-24 | End: 2024-06-24 | Stop reason: SDUPTHER

## 2024-06-24 RX ORDER — CARVEDILOL 25 MG/1
25 TABLET ORAL 2 TIMES DAILY WITH MEALS
Qty: 180 TABLET | Refills: 3 | Status: SHIPPED | OUTPATIENT
Start: 2024-06-24

## 2024-06-24 RX ORDER — POTASSIUM CHLORIDE 750 MG/1
10 TABLET, FILM COATED, EXTENDED RELEASE ORAL DAILY
Qty: 90 TABLET | Refills: 3 | Status: SHIPPED | OUTPATIENT
Start: 2024-06-24

## 2024-06-24 RX ORDER — TIZANIDINE 4 MG/1
TABLET ORAL
Qty: 20 TABLET | Refills: 0 | OUTPATIENT
Start: 2024-06-24

## 2024-06-24 RX ORDER — APIXABAN 5 MG/1
5 TABLET, FILM COATED ORAL 2 TIMES DAILY
Qty: 180 TABLET | Refills: 3 | Status: SHIPPED | OUTPATIENT
Start: 2024-06-24

## 2024-06-24 RX ORDER — AMLODIPINE BESYLATE 5 MG/1
5 TABLET ORAL 2 TIMES DAILY
Qty: 180 TABLET | Refills: 3 | Status: SHIPPED | OUTPATIENT
Start: 2024-06-24

## 2024-06-25 LAB
BACTERIA SPEC ANAEROBE CULT: ABNORMAL
FUNGUS SPEC CULT: NORMAL
FUNGUS SPEC FUNGUS STN: NORMAL
SIGNIFICANT IND 70042: ABNORMAL
SIGNIFICANT IND 70042: NORMAL
SITE SITE: ABNORMAL
SITE SITE: NORMAL
SOURCE SOURCE: ABNORMAL
SOURCE SOURCE: NORMAL

## 2024-06-26 RX ORDER — TIZANIDINE 4 MG/1
TABLET ORAL
Qty: 20 TABLET | Refills: 0 | OUTPATIENT
Start: 2024-06-26

## 2024-06-28 RX ORDER — TIZANIDINE 4 MG/1
TABLET ORAL
Qty: 20 TABLET | Refills: 0 | OUTPATIENT
Start: 2024-06-28

## 2024-06-30 RX ORDER — TIZANIDINE 4 MG/1
TABLET ORAL
Qty: 20 TABLET | Refills: 0 | OUTPATIENT
Start: 2024-06-30

## 2024-07-01 ENCOUNTER — HOSPITAL ENCOUNTER (OUTPATIENT)
Facility: MEDICAL CENTER | Age: 59
End: 2024-07-01
Attending: INTERNAL MEDICINE
Payer: COMMERCIAL

## 2024-07-01 LAB
ALBUMIN SERPL BCP-MCNC: 4.1 G/DL (ref 3.2–4.9)
ALBUMIN/GLOB SERPL: 1.5 G/DL
ALP SERPL-CCNC: 61 U/L (ref 30–99)
ALT SERPL-CCNC: 13 U/L (ref 2–50)
ANION GAP SERPL CALC-SCNC: 13 MMOL/L (ref 7–16)
AST SERPL-CCNC: 14 U/L (ref 12–45)
BASOPHILS # BLD AUTO: 1 % (ref 0–1.8)
BASOPHILS # BLD: 0.05 K/UL (ref 0–0.12)
BILIRUB SERPL-MCNC: 0.2 MG/DL (ref 0.1–1.5)
BUN SERPL-MCNC: 13 MG/DL (ref 8–22)
CALCIUM ALBUM COR SERPL-MCNC: 9.4 MG/DL (ref 8.5–10.5)
CALCIUM SERPL-MCNC: 9.5 MG/DL (ref 8.5–10.5)
CHLORIDE SERPL-SCNC: 100 MMOL/L (ref 96–112)
CK SERPL-CCNC: 151 U/L (ref 0–154)
CO2 SERPL-SCNC: 24 MMOL/L (ref 20–33)
CREAT SERPL-MCNC: 0.63 MG/DL (ref 0.5–1.4)
CRP SERPL HS-MCNC: 0.58 MG/DL (ref 0–0.75)
EOSINOPHIL # BLD AUTO: 0.23 K/UL (ref 0–0.51)
EOSINOPHIL NFR BLD: 4.5 % (ref 0–6.9)
ERYTHROCYTE [DISTWIDTH] IN BLOOD BY AUTOMATED COUNT: 41.1 FL (ref 35.9–50)
ERYTHROCYTE [SEDIMENTATION RATE] IN BLOOD BY WESTERGREN METHOD: 33 MM/HOUR (ref 0–20)
GFR SERPLBLD CREATININE-BSD FMLA CKD-EPI: 108 ML/MIN/1.73 M 2
GLOBULIN SER CALC-MCNC: 2.8 G/DL (ref 1.9–3.5)
GLUCOSE SERPL-MCNC: 128 MG/DL (ref 65–99)
HCT VFR BLD AUTO: 35 % (ref 42–52)
HGB BLD-MCNC: 11.2 G/DL (ref 14–18)
IMM GRANULOCYTES # BLD AUTO: 0.01 K/UL (ref 0–0.11)
IMM GRANULOCYTES NFR BLD AUTO: 0.2 % (ref 0–0.9)
LYMPHOCYTES # BLD AUTO: 1.67 K/UL (ref 1–4.8)
LYMPHOCYTES NFR BLD: 32.7 % (ref 22–41)
MCH RBC QN AUTO: 26.5 PG (ref 27–33)
MCHC RBC AUTO-ENTMCNC: 32 G/DL (ref 32.3–36.5)
MCV RBC AUTO: 82.9 FL (ref 81.4–97.8)
MONOCYTES # BLD AUTO: 0.56 K/UL (ref 0–0.85)
MONOCYTES NFR BLD AUTO: 11 % (ref 0–13.4)
NEUTROPHILS # BLD AUTO: 2.59 K/UL (ref 1.82–7.42)
NEUTROPHILS NFR BLD: 50.6 % (ref 44–72)
NRBC # BLD AUTO: 0 K/UL
NRBC BLD-RTO: 0 /100 WBC (ref 0–0.2)
PLATELET # BLD AUTO: 495 K/UL (ref 164–446)
PMV BLD AUTO: 9.9 FL (ref 9–12.9)
POTASSIUM SERPL-SCNC: 4.3 MMOL/L (ref 3.6–5.5)
PROT SERPL-MCNC: 6.9 G/DL (ref 6–8.2)
RBC # BLD AUTO: 4.22 M/UL (ref 4.7–6.1)
SODIUM SERPL-SCNC: 137 MMOL/L (ref 135–145)
WBC # BLD AUTO: 5.1 K/UL (ref 4.8–10.8)

## 2024-07-01 PROCEDURE — 85025 COMPLETE CBC W/AUTO DIFF WBC: CPT

## 2024-07-01 PROCEDURE — 82550 ASSAY OF CK (CPK): CPT

## 2024-07-01 PROCEDURE — 80053 COMPREHEN METABOLIC PANEL: CPT

## 2024-07-01 PROCEDURE — 86140 C-REACTIVE PROTEIN: CPT

## 2024-07-01 PROCEDURE — 85652 RBC SED RATE AUTOMATED: CPT

## 2024-07-08 ENCOUNTER — HOSPITAL ENCOUNTER (OUTPATIENT)
Facility: MEDICAL CENTER | Age: 59
End: 2024-07-08
Attending: INTERNAL MEDICINE
Payer: COMMERCIAL

## 2024-07-08 LAB
ALBUMIN SERPL BCP-MCNC: 4.4 G/DL (ref 3.2–4.9)
ALBUMIN/GLOB SERPL: 1.9 G/DL
ALP SERPL-CCNC: 56 U/L (ref 30–99)
ALT SERPL-CCNC: 20 U/L (ref 2–50)
ANION GAP SERPL CALC-SCNC: 14 MMOL/L (ref 7–16)
AST SERPL-CCNC: 19 U/L (ref 12–45)
BASOPHILS # BLD AUTO: 0.9 % (ref 0–1.8)
BASOPHILS # BLD: 0.05 K/UL (ref 0–0.12)
BILIRUB SERPL-MCNC: <0.2 MG/DL (ref 0.1–1.5)
BUN SERPL-MCNC: 14 MG/DL (ref 8–22)
CALCIUM ALBUM COR SERPL-MCNC: 9.5 MG/DL (ref 8.5–10.5)
CALCIUM SERPL-MCNC: 9.8 MG/DL (ref 8.5–10.5)
CHLORIDE SERPL-SCNC: 100 MMOL/L (ref 96–112)
CK SERPL-CCNC: 143 U/L (ref 0–154)
CO2 SERPL-SCNC: 23 MMOL/L (ref 20–33)
CREAT SERPL-MCNC: 0.56 MG/DL (ref 0.5–1.4)
CRP SERPL HS-MCNC: <0.3 MG/DL (ref 0–0.75)
EOSINOPHIL # BLD AUTO: 0.22 K/UL (ref 0–0.51)
EOSINOPHIL NFR BLD: 4.1 % (ref 0–6.9)
ERYTHROCYTE [DISTWIDTH] IN BLOOD BY AUTOMATED COUNT: 41.3 FL (ref 35.9–50)
ERYTHROCYTE [SEDIMENTATION RATE] IN BLOOD BY WESTERGREN METHOD: 29 MM/HOUR (ref 0–20)
FASTING STATUS PATIENT QL REPORTED: NORMAL
GFR SERPLBLD CREATININE-BSD FMLA CKD-EPI: 112 ML/MIN/1.73 M 2
GLOBULIN SER CALC-MCNC: 2.3 G/DL (ref 1.9–3.5)
GLUCOSE SERPL-MCNC: 111 MG/DL (ref 65–99)
HCT VFR BLD AUTO: 36 % (ref 42–52)
HGB BLD-MCNC: 11.4 G/DL (ref 14–18)
IMM GRANULOCYTES # BLD AUTO: 0.01 K/UL (ref 0–0.11)
IMM GRANULOCYTES NFR BLD AUTO: 0.2 % (ref 0–0.9)
LYMPHOCYTES # BLD AUTO: 1.72 K/UL (ref 1–4.8)
LYMPHOCYTES NFR BLD: 31.7 % (ref 22–41)
MCH RBC QN AUTO: 26 PG (ref 27–33)
MCHC RBC AUTO-ENTMCNC: 31.7 G/DL (ref 32.3–36.5)
MCV RBC AUTO: 82 FL (ref 81.4–97.8)
MONOCYTES # BLD AUTO: 0.57 K/UL (ref 0–0.85)
MONOCYTES NFR BLD AUTO: 10.5 % (ref 0–13.4)
MYCOBACTERIUM SPEC CULT: NORMAL
NEUTROPHILS # BLD AUTO: 2.85 K/UL (ref 1.82–7.42)
NEUTROPHILS NFR BLD: 52.6 % (ref 44–72)
NRBC # BLD AUTO: 0 K/UL
NRBC BLD-RTO: 0 /100 WBC (ref 0–0.2)
PLATELET # BLD AUTO: 401 K/UL (ref 164–446)
PMV BLD AUTO: 10.1 FL (ref 9–12.9)
POTASSIUM SERPL-SCNC: 4.1 MMOL/L (ref 3.6–5.5)
PROT SERPL-MCNC: 6.7 G/DL (ref 6–8.2)
RBC # BLD AUTO: 4.39 M/UL (ref 4.7–6.1)
RHODAMINE-AURAMINE STN SPEC: NORMAL
SIGNIFICANT IND 70042: NORMAL
SITE SITE: NORMAL
SODIUM SERPL-SCNC: 137 MMOL/L (ref 135–145)
SOURCE SOURCE: NORMAL
WBC # BLD AUTO: 5.4 K/UL (ref 4.8–10.8)

## 2024-07-08 PROCEDURE — 80053 COMPREHEN METABOLIC PANEL: CPT

## 2024-07-08 PROCEDURE — 86140 C-REACTIVE PROTEIN: CPT

## 2024-07-08 PROCEDURE — 85652 RBC SED RATE AUTOMATED: CPT

## 2024-07-08 PROCEDURE — 85025 COMPLETE CBC W/AUTO DIFF WBC: CPT

## 2024-07-08 PROCEDURE — 82550 ASSAY OF CK (CPK): CPT

## 2024-07-13 DIAGNOSIS — G89.18 PAIN AT SURGICAL SITE: ICD-10-CM

## 2024-07-15 ENCOUNTER — HOSPITAL ENCOUNTER (OUTPATIENT)
Facility: MEDICAL CENTER | Age: 59
End: 2024-07-15
Attending: INTERNAL MEDICINE
Payer: COMMERCIAL

## 2024-07-15 LAB
ALBUMIN SERPL BCP-MCNC: 4.3 G/DL (ref 3.2–4.9)
ALBUMIN/GLOB SERPL: 1.7 G/DL
ALP SERPL-CCNC: 47 U/L (ref 30–99)
ALT SERPL-CCNC: 20 U/L (ref 2–50)
ANION GAP SERPL CALC-SCNC: 11 MMOL/L (ref 7–16)
AST SERPL-CCNC: 20 U/L (ref 12–45)
BASOPHILS # BLD AUTO: 1 % (ref 0–1.8)
BASOPHILS # BLD: 0.05 K/UL (ref 0–0.12)
BILIRUB SERPL-MCNC: 0.2 MG/DL (ref 0.1–1.5)
BUN SERPL-MCNC: 10 MG/DL (ref 8–22)
CALCIUM ALBUM COR SERPL-MCNC: 8.9 MG/DL (ref 8.5–10.5)
CALCIUM SERPL-MCNC: 9.1 MG/DL (ref 8.5–10.5)
CHLORIDE SERPL-SCNC: 100 MMOL/L (ref 96–112)
CK SERPL-CCNC: 237 U/L (ref 0–154)
CO2 SERPL-SCNC: 24 MMOL/L (ref 20–33)
CREAT SERPL-MCNC: 0.54 MG/DL (ref 0.5–1.4)
CRP SERPL HS-MCNC: <0.3 MG/DL (ref 0–0.75)
EOSINOPHIL # BLD AUTO: 0.23 K/UL (ref 0–0.51)
EOSINOPHIL NFR BLD: 4.7 % (ref 0–6.9)
ERYTHROCYTE [DISTWIDTH] IN BLOOD BY AUTOMATED COUNT: 41.5 FL (ref 35.9–50)
ERYTHROCYTE [SEDIMENTATION RATE] IN BLOOD BY WESTERGREN METHOD: 24 MM/HOUR (ref 0–20)
GFR SERPLBLD CREATININE-BSD FMLA CKD-EPI: 113 ML/MIN/1.73 M 2
GLOBULIN SER CALC-MCNC: 2.5 G/DL (ref 1.9–3.5)
GLUCOSE SERPL-MCNC: 115 MG/DL (ref 65–99)
HCT VFR BLD AUTO: 36.7 % (ref 42–52)
HGB BLD-MCNC: 11.5 G/DL (ref 14–18)
IMM GRANULOCYTES # BLD AUTO: 0.01 K/UL (ref 0–0.11)
IMM GRANULOCYTES NFR BLD AUTO: 0.2 % (ref 0–0.9)
LYMPHOCYTES # BLD AUTO: 1.85 K/UL (ref 1–4.8)
LYMPHOCYTES NFR BLD: 37.5 % (ref 22–41)
MCH RBC QN AUTO: 25.6 PG (ref 27–33)
MCHC RBC AUTO-ENTMCNC: 31.3 G/DL (ref 32.3–36.5)
MCV RBC AUTO: 81.7 FL (ref 81.4–97.8)
MONOCYTES # BLD AUTO: 0.51 K/UL (ref 0–0.85)
MONOCYTES NFR BLD AUTO: 10.3 % (ref 0–13.4)
NEUTROPHILS # BLD AUTO: 2.28 K/UL (ref 1.82–7.42)
NEUTROPHILS NFR BLD: 46.3 % (ref 44–72)
NRBC # BLD AUTO: 0 K/UL
NRBC BLD-RTO: 0 /100 WBC (ref 0–0.2)
PLATELET # BLD AUTO: 370 K/UL (ref 164–446)
PMV BLD AUTO: 10.7 FL (ref 9–12.9)
POTASSIUM SERPL-SCNC: 4.3 MMOL/L (ref 3.6–5.5)
PROT SERPL-MCNC: 6.8 G/DL (ref 6–8.2)
RBC # BLD AUTO: 4.49 M/UL (ref 4.7–6.1)
SODIUM SERPL-SCNC: 135 MMOL/L (ref 135–145)
WBC # BLD AUTO: 4.9 K/UL (ref 4.8–10.8)

## 2024-07-15 PROCEDURE — 85025 COMPLETE CBC W/AUTO DIFF WBC: CPT

## 2024-07-15 PROCEDURE — 85652 RBC SED RATE AUTOMATED: CPT

## 2024-07-15 PROCEDURE — 82550 ASSAY OF CK (CPK): CPT

## 2024-07-15 PROCEDURE — 80053 COMPREHEN METABOLIC PANEL: CPT

## 2024-07-15 PROCEDURE — 86140 C-REACTIVE PROTEIN: CPT

## 2024-07-18 RX ORDER — OXYCODONE HYDROCHLORIDE AND ACETAMINOPHEN 5; 325 MG/1; MG/1
0.5 TABLET ORAL
Qty: 15 TABLET | Refills: 0 | Status: SHIPPED | OUTPATIENT
Start: 2024-07-18 | End: 2024-08-17

## 2024-07-22 ENCOUNTER — HOSPITAL ENCOUNTER (OUTPATIENT)
Facility: MEDICAL CENTER | Age: 59
End: 2024-07-22
Attending: INTERNAL MEDICINE
Payer: COMMERCIAL

## 2024-07-22 LAB
ALBUMIN SERPL BCP-MCNC: 4.3 G/DL (ref 3.2–4.9)
ALBUMIN/GLOB SERPL: 2 G/DL
ALP SERPL-CCNC: 45 U/L (ref 30–99)
ALT SERPL-CCNC: 23 U/L (ref 2–50)
ANION GAP SERPL CALC-SCNC: 12 MMOL/L (ref 7–16)
AST SERPL-CCNC: 18 U/L (ref 12–45)
BASOPHILS # BLD AUTO: 1.1 % (ref 0–1.8)
BASOPHILS # BLD: 0.05 K/UL (ref 0–0.12)
BILIRUB SERPL-MCNC: 0.2 MG/DL (ref 0.1–1.5)
BUN SERPL-MCNC: 15 MG/DL (ref 8–22)
CALCIUM ALBUM COR SERPL-MCNC: 9.5 MG/DL (ref 8.5–10.5)
CALCIUM SERPL-MCNC: 9.7 MG/DL (ref 8.5–10.5)
CHLORIDE SERPL-SCNC: 105 MMOL/L (ref 96–112)
CK SERPL-CCNC: 88 U/L (ref 0–154)
CO2 SERPL-SCNC: 23 MMOL/L (ref 20–33)
CREAT SERPL-MCNC: 0.57 MG/DL (ref 0.5–1.4)
CRP SERPL HS-MCNC: <0.3 MG/DL (ref 0–0.75)
EOSINOPHIL # BLD AUTO: 0.27 K/UL (ref 0–0.51)
EOSINOPHIL NFR BLD: 5.8 % (ref 0–6.9)
ERYTHROCYTE [DISTWIDTH] IN BLOOD BY AUTOMATED COUNT: 42.1 FL (ref 35.9–50)
ERYTHROCYTE [SEDIMENTATION RATE] IN BLOOD BY WESTERGREN METHOD: 16 MM/HOUR (ref 0–20)
GFR SERPLBLD CREATININE-BSD FMLA CKD-EPI: 111 ML/MIN/1.73 M 2
GLOBULIN SER CALC-MCNC: 2.1 G/DL (ref 1.9–3.5)
GLUCOSE SERPL-MCNC: 105 MG/DL (ref 65–99)
HCT VFR BLD AUTO: 36.9 % (ref 42–52)
HGB BLD-MCNC: 11.8 G/DL (ref 14–18)
IMM GRANULOCYTES # BLD AUTO: 0 K/UL (ref 0–0.11)
IMM GRANULOCYTES NFR BLD AUTO: 0 % (ref 0–0.9)
LYMPHOCYTES # BLD AUTO: 1.89 K/UL (ref 1–4.8)
LYMPHOCYTES NFR BLD: 40.7 % (ref 22–41)
MCH RBC QN AUTO: 26 PG (ref 27–33)
MCHC RBC AUTO-ENTMCNC: 32 G/DL (ref 32.3–36.5)
MCV RBC AUTO: 81.5 FL (ref 81.4–97.8)
MONOCYTES # BLD AUTO: 0.48 K/UL (ref 0–0.85)
MONOCYTES NFR BLD AUTO: 10.3 % (ref 0–13.4)
NEUTROPHILS # BLD AUTO: 1.95 K/UL (ref 1.82–7.42)
NEUTROPHILS NFR BLD: 42.1 % (ref 44–72)
NRBC # BLD AUTO: 0 K/UL
NRBC BLD-RTO: 0 /100 WBC (ref 0–0.2)
PLATELET # BLD AUTO: 334 K/UL (ref 164–446)
PMV BLD AUTO: 10.2 FL (ref 9–12.9)
POTASSIUM SERPL-SCNC: 4.2 MMOL/L (ref 3.6–5.5)
PROT SERPL-MCNC: 6.4 G/DL (ref 6–8.2)
RBC # BLD AUTO: 4.53 M/UL (ref 4.7–6.1)
SODIUM SERPL-SCNC: 140 MMOL/L (ref 135–145)
WBC # BLD AUTO: 4.6 K/UL (ref 4.8–10.8)

## 2024-07-22 PROCEDURE — 82550 ASSAY OF CK (CPK): CPT

## 2024-07-22 PROCEDURE — 86140 C-REACTIVE PROTEIN: CPT

## 2024-07-22 PROCEDURE — 85652 RBC SED RATE AUTOMATED: CPT

## 2024-07-22 PROCEDURE — 85025 COMPLETE CBC W/AUTO DIFF WBC: CPT

## 2024-07-22 PROCEDURE — 80053 COMPREHEN METABOLIC PANEL: CPT

## 2024-08-05 ENCOUNTER — HOSPITAL ENCOUNTER (OUTPATIENT)
Facility: MEDICAL CENTER | Age: 59
End: 2024-08-05
Attending: INTERNAL MEDICINE
Payer: COMMERCIAL

## 2024-08-05 LAB
ALBUMIN SERPL BCP-MCNC: 4.6 G/DL (ref 3.2–4.9)
ALBUMIN/GLOB SERPL: 1.7 G/DL
ALP SERPL-CCNC: 48 U/L (ref 30–99)
ALT SERPL-CCNC: 25 U/L (ref 2–50)
ANION GAP SERPL CALC-SCNC: 13 MMOL/L (ref 7–16)
AST SERPL-CCNC: 19 U/L (ref 12–45)
BASOPHILS # BLD AUTO: 1.6 % (ref 0–1.8)
BASOPHILS # BLD: 0.09 K/UL (ref 0–0.12)
BILIRUB SERPL-MCNC: 0.2 MG/DL (ref 0.1–1.5)
BUN SERPL-MCNC: 19 MG/DL (ref 8–22)
CALCIUM ALBUM COR SERPL-MCNC: 9.4 MG/DL (ref 8.5–10.5)
CALCIUM SERPL-MCNC: 9.9 MG/DL (ref 8.5–10.5)
CHLORIDE SERPL-SCNC: 102 MMOL/L (ref 96–112)
CO2 SERPL-SCNC: 22 MMOL/L (ref 20–33)
CREAT SERPL-MCNC: 0.63 MG/DL (ref 0.5–1.4)
CRP SERPL HS-MCNC: <0.3 MG/DL (ref 0–0.75)
EOSINOPHIL # BLD AUTO: 0.42 K/UL (ref 0–0.51)
EOSINOPHIL NFR BLD: 7.6 % (ref 0–6.9)
ERYTHROCYTE [DISTWIDTH] IN BLOOD BY AUTOMATED COUNT: 42.9 FL (ref 35.9–50)
ERYTHROCYTE [SEDIMENTATION RATE] IN BLOOD BY WESTERGREN METHOD: 17 MM/HOUR (ref 0–20)
GFR SERPLBLD CREATININE-BSD FMLA CKD-EPI: 108 ML/MIN/1.73 M 2
GLOBULIN SER CALC-MCNC: 2.7 G/DL (ref 1.9–3.5)
GLUCOSE SERPL-MCNC: 110 MG/DL (ref 65–99)
HCT VFR BLD AUTO: 37.6 % (ref 42–52)
HGB BLD-MCNC: 12.7 G/DL (ref 14–18)
IMM GRANULOCYTES # BLD AUTO: 0.03 K/UL (ref 0–0.11)
IMM GRANULOCYTES NFR BLD AUTO: 0.5 % (ref 0–0.9)
LYMPHOCYTES # BLD AUTO: 1.92 K/UL (ref 1–4.8)
LYMPHOCYTES NFR BLD: 34.6 % (ref 22–41)
MCH RBC QN AUTO: 26.8 PG (ref 27–33)
MCHC RBC AUTO-ENTMCNC: 33.8 G/DL (ref 32.3–36.5)
MCV RBC AUTO: 79.5 FL (ref 81.4–97.8)
MONOCYTES # BLD AUTO: 0.59 K/UL (ref 0–0.85)
MONOCYTES NFR BLD AUTO: 10.6 % (ref 0–13.4)
NEUTROPHILS # BLD AUTO: 2.5 K/UL (ref 1.82–7.42)
NEUTROPHILS NFR BLD: 45.1 % (ref 44–72)
NRBC # BLD AUTO: 0 K/UL
NRBC BLD-RTO: 0 /100 WBC (ref 0–0.2)
PLATELET # BLD AUTO: 319 K/UL (ref 164–446)
PMV BLD AUTO: 10.4 FL (ref 9–12.9)
POTASSIUM SERPL-SCNC: 4.4 MMOL/L (ref 3.6–5.5)
PROT SERPL-MCNC: 7.3 G/DL (ref 6–8.2)
RBC # BLD AUTO: 4.73 M/UL (ref 4.7–6.1)
SODIUM SERPL-SCNC: 137 MMOL/L (ref 135–145)
WBC # BLD AUTO: 5.6 K/UL (ref 4.8–10.8)

## 2024-08-05 PROCEDURE — 80053 COMPREHEN METABOLIC PANEL: CPT

## 2024-08-05 PROCEDURE — 86140 C-REACTIVE PROTEIN: CPT

## 2024-08-05 PROCEDURE — 85025 COMPLETE CBC W/AUTO DIFF WBC: CPT

## 2024-08-05 PROCEDURE — 85652 RBC SED RATE AUTOMATED: CPT

## 2024-08-17 DIAGNOSIS — G89.18 PAIN AT SURGICAL SITE: ICD-10-CM

## 2024-08-17 DIAGNOSIS — I10 ESSENTIAL HYPERTENSION: ICD-10-CM

## 2024-08-20 NOTE — TELEPHONE ENCOUNTER
[id number]  last OV    Received request via: Patient    Was the patient seen in the last year in this department? Yes    Does the patient have an active prescription (recently filled or refills available) for medication(s) requested? No    Pharmacy Name:     Does the patient have [facility] Plus and need 100-day supply? (This applies to ALL medications) Patient does not have SCP

## 2024-08-21 RX ORDER — OXYCODONE AND ACETAMINOPHEN 5; 325 MG/1; MG/1
0.5 TABLET ORAL
Qty: 15 TABLET | Refills: 0 | Status: SHIPPED | OUTPATIENT
Start: 2024-08-21 | End: 2024-09-20

## 2024-08-21 RX ORDER — FUROSEMIDE 40 MG
40 TABLET ORAL DAILY
Qty: 90 TABLET | Refills: 3 | Status: SHIPPED | OUTPATIENT
Start: 2024-08-21

## 2024-08-21 RX ORDER — CARVEDILOL 25 MG/1
25 TABLET ORAL 2 TIMES DAILY WITH MEALS
Qty: 180 TABLET | Refills: 3 | Status: SHIPPED | OUTPATIENT
Start: 2024-08-21

## 2024-08-21 RX ORDER — POTASSIUM CHLORIDE 750 MG/1
10 TABLET, EXTENDED RELEASE ORAL DAILY
Qty: 90 TABLET | Refills: 3 | Status: SHIPPED | OUTPATIENT
Start: 2024-08-21

## 2024-08-21 RX ORDER — AMLODIPINE BESYLATE 5 MG/1
5 TABLET ORAL 2 TIMES DAILY
Qty: 180 TABLET | Refills: 3 | Status: SHIPPED | OUTPATIENT
Start: 2024-08-21

## 2024-08-21 RX ORDER — APIXABAN 5 MG/1
5 TABLET, FILM COATED ORAL 2 TIMES DAILY
Qty: 180 TABLET | Refills: 3 | Status: SHIPPED | OUTPATIENT
Start: 2024-08-21

## 2024-08-29 ENCOUNTER — HOSPITAL ENCOUNTER (OUTPATIENT)
Facility: MEDICAL CENTER | Age: 59
End: 2024-08-29
Attending: FAMILY MEDICINE
Payer: COMMERCIAL

## 2024-08-29 ENCOUNTER — HOSPITAL ENCOUNTER (OUTPATIENT)
Facility: MEDICAL CENTER | Age: 59
End: 2024-08-29
Attending: INTERNAL MEDICINE
Payer: COMMERCIAL

## 2024-08-29 DIAGNOSIS — E11.69 TYPE 2 DIABETES MELLITUS WITH OTHER SPECIFIED COMPLICATION, WITH LONG-TERM CURRENT USE OF INSULIN (HCC): ICD-10-CM

## 2024-08-29 DIAGNOSIS — Z79.4 TYPE 2 DIABETES MELLITUS WITH OTHER SPECIFIED COMPLICATION, WITH LONG-TERM CURRENT USE OF INSULIN (HCC): ICD-10-CM

## 2024-08-29 LAB
ALBUMIN SERPL BCP-MCNC: 4.7 G/DL (ref 3.2–4.9)
ALBUMIN SERPL BCP-MCNC: 4.7 G/DL (ref 3.2–4.9)
ALBUMIN/GLOB SERPL: 1.9 G/DL
ALBUMIN/GLOB SERPL: 1.9 G/DL
ALP SERPL-CCNC: 39 U/L (ref 30–99)
ALP SERPL-CCNC: 40 U/L (ref 30–99)
ALT SERPL-CCNC: 20 U/L (ref 2–50)
ALT SERPL-CCNC: 21 U/L (ref 2–50)
ANION GAP SERPL CALC-SCNC: 12 MMOL/L (ref 7–16)
ANION GAP SERPL CALC-SCNC: 13 MMOL/L (ref 7–16)
AST SERPL-CCNC: 17 U/L (ref 12–45)
AST SERPL-CCNC: 21 U/L (ref 12–45)
BASOPHILS # BLD AUTO: 0.7 % (ref 0–1.8)
BASOPHILS # BLD: 0.03 K/UL (ref 0–0.12)
BILIRUB SERPL-MCNC: 0.2 MG/DL (ref 0.1–1.5)
BILIRUB SERPL-MCNC: 0.2 MG/DL (ref 0.1–1.5)
BUN SERPL-MCNC: 18 MG/DL (ref 8–22)
BUN SERPL-MCNC: 18 MG/DL (ref 8–22)
CALCIUM ALBUM COR SERPL-MCNC: 9.3 MG/DL (ref 8.5–10.5)
CALCIUM ALBUM COR SERPL-MCNC: 9.3 MG/DL (ref 8.5–10.5)
CALCIUM SERPL-MCNC: 9.9 MG/DL (ref 8.5–10.5)
CALCIUM SERPL-MCNC: 9.9 MG/DL (ref 8.5–10.5)
CHLORIDE SERPL-SCNC: 104 MMOL/L (ref 96–112)
CHLORIDE SERPL-SCNC: 105 MMOL/L (ref 96–112)
CHOLEST SERPL-MCNC: 222 MG/DL (ref 100–199)
CO2 SERPL-SCNC: 22 MMOL/L (ref 20–33)
CO2 SERPL-SCNC: 23 MMOL/L (ref 20–33)
CREAT SERPL-MCNC: 0.49 MG/DL (ref 0.5–1.4)
CREAT SERPL-MCNC: 0.53 MG/DL (ref 0.5–1.4)
CREAT UR-MCNC: 42.93 MG/DL
CRP SERPL HS-MCNC: <0.3 MG/DL (ref 0–0.75)
EOSINOPHIL # BLD AUTO: 0.37 K/UL (ref 0–0.51)
EOSINOPHIL NFR BLD: 8.5 % (ref 0–6.9)
ERYTHROCYTE [DISTWIDTH] IN BLOOD BY AUTOMATED COUNT: 47.3 FL (ref 35.9–50)
ERYTHROCYTE [SEDIMENTATION RATE] IN BLOOD BY WESTERGREN METHOD: 14 MM/HOUR (ref 0–20)
EST. AVERAGE GLUCOSE BLD GHB EST-MCNC: 128 MG/DL
GFR SERPLBLD CREATININE-BSD FMLA CKD-EPI: 114 ML/MIN/1.73 M 2
GFR SERPLBLD CREATININE-BSD FMLA CKD-EPI: 116 ML/MIN/1.73 M 2
GLOBULIN SER CALC-MCNC: 2.5 G/DL (ref 1.9–3.5)
GLOBULIN SER CALC-MCNC: 2.5 G/DL (ref 1.9–3.5)
GLUCOSE SERPL-MCNC: 95 MG/DL (ref 65–99)
GLUCOSE SERPL-MCNC: 95 MG/DL (ref 65–99)
HBA1C MFR BLD: 6.1 % (ref 4–5.6)
HCT VFR BLD AUTO: 38.3 % (ref 42–52)
HDLC SERPL-MCNC: 52 MG/DL
HGB BLD-MCNC: 12.8 G/DL (ref 14–18)
IMM GRANULOCYTES # BLD AUTO: 0.01 K/UL (ref 0–0.11)
IMM GRANULOCYTES NFR BLD AUTO: 0.2 % (ref 0–0.9)
LDLC SERPL CALC-MCNC: 146 MG/DL
LYMPHOCYTES # BLD AUTO: 1.83 K/UL (ref 1–4.8)
LYMPHOCYTES NFR BLD: 42.1 % (ref 22–41)
MCH RBC QN AUTO: 27.4 PG (ref 27–33)
MCHC RBC AUTO-ENTMCNC: 33.4 G/DL (ref 32.3–36.5)
MCV RBC AUTO: 81.8 FL (ref 81.4–97.8)
MICROALBUMIN UR-MCNC: <1.2 MG/DL
MICROALBUMIN/CREAT UR: NORMAL MG/G (ref 0–30)
MONOCYTES # BLD AUTO: 0.47 K/UL (ref 0–0.85)
MONOCYTES NFR BLD AUTO: 10.8 % (ref 0–13.4)
NEUTROPHILS # BLD AUTO: 1.64 K/UL (ref 1.82–7.42)
NEUTROPHILS NFR BLD: 37.7 % (ref 44–72)
NRBC # BLD AUTO: 0 K/UL
NRBC BLD-RTO: 0 /100 WBC (ref 0–0.2)
PLATELET # BLD AUTO: 353 K/UL (ref 164–446)
PMV BLD AUTO: 10.3 FL (ref 9–12.9)
POTASSIUM SERPL-SCNC: 4.1 MMOL/L (ref 3.6–5.5)
POTASSIUM SERPL-SCNC: 4.2 MMOL/L (ref 3.6–5.5)
PROT SERPL-MCNC: 7.2 G/DL (ref 6–8.2)
PROT SERPL-MCNC: 7.2 G/DL (ref 6–8.2)
RBC # BLD AUTO: 4.68 M/UL (ref 4.7–6.1)
SODIUM SERPL-SCNC: 139 MMOL/L (ref 135–145)
SODIUM SERPL-SCNC: 140 MMOL/L (ref 135–145)
TRIGL SERPL-MCNC: 122 MG/DL (ref 0–149)
WBC # BLD AUTO: 4.4 K/UL (ref 4.8–10.8)

## 2024-08-29 PROCEDURE — 85025 COMPLETE CBC W/AUTO DIFF WBC: CPT

## 2024-08-29 PROCEDURE — 82043 UR ALBUMIN QUANTITATIVE: CPT

## 2024-08-29 PROCEDURE — 85652 RBC SED RATE AUTOMATED: CPT

## 2024-08-29 PROCEDURE — 86140 C-REACTIVE PROTEIN: CPT

## 2024-08-29 PROCEDURE — 80061 LIPID PANEL: CPT

## 2024-08-29 PROCEDURE — 83036 HEMOGLOBIN GLYCOSYLATED A1C: CPT

## 2024-08-29 PROCEDURE — 80053 COMPREHEN METABOLIC PANEL: CPT

## 2024-08-29 PROCEDURE — 80053 COMPREHEN METABOLIC PANEL: CPT | Mod: 91

## 2024-08-29 PROCEDURE — 82570 ASSAY OF URINE CREATININE: CPT

## 2024-09-02 NOTE — TELEPHONE ENCOUNTER
[id number]    Last Ov      Received request via: Patient    Was the patient seen in the last year in this department? Yes    Does the patient have an active prescription (recently filled or refills available) for medication(s) requested? No    [facility] Name[email or web address]    Does the patient have [facility] Plus and need 100-day supply? (This applies to ALL medications) Patient does not have SCP

## 2024-09-04 RX ORDER — LANCETS 30 GAUGE
EACH MISCELLANEOUS
Qty: 300 EACH | Refills: 4 | Status: SHIPPED | OUTPATIENT
Start: 2024-09-04

## 2024-09-05 ENCOUNTER — OFFICE VISIT (OUTPATIENT)
Dept: MEDICAL GROUP | Facility: PHYSICIAN GROUP | Age: 59
End: 2024-09-05
Payer: COMMERCIAL

## 2024-09-05 VITALS
DIASTOLIC BLOOD PRESSURE: 82 MMHG | RESPIRATION RATE: 16 BRPM | HEIGHT: 72 IN | TEMPERATURE: 96.6 F | BODY MASS INDEX: 29.26 KG/M2 | HEART RATE: 75 BPM | SYSTOLIC BLOOD PRESSURE: 134 MMHG | OXYGEN SATURATION: 99 % | WEIGHT: 216 LBS

## 2024-09-05 DIAGNOSIS — Z23 NEED FOR VACCINATION: ICD-10-CM

## 2024-09-05 DIAGNOSIS — Z12.11 SCREENING FOR COLORECTAL CANCER: ICD-10-CM

## 2024-09-05 DIAGNOSIS — M86.111: ICD-10-CM

## 2024-09-05 DIAGNOSIS — Z12.12 SCREENING FOR COLORECTAL CANCER: ICD-10-CM

## 2024-09-05 DIAGNOSIS — Z12.11 COLON CANCER SCREENING: ICD-10-CM

## 2024-09-05 DIAGNOSIS — Z79.4 TYPE 2 DIABETES MELLITUS WITH OTHER SPECIFIED COMPLICATION, WITH LONG-TERM CURRENT USE OF INSULIN (HCC): ICD-10-CM

## 2024-09-05 DIAGNOSIS — E11.621 DIABETIC ULCER OF FOOT, LIMITED TO BREAKDOWN OF SKIN (HCC): ICD-10-CM

## 2024-09-05 DIAGNOSIS — E11.69 TYPE 2 DIABETES MELLITUS WITH OTHER SPECIFIED COMPLICATION, WITH LONG-TERM CURRENT USE OF INSULIN (HCC): ICD-10-CM

## 2024-09-05 DIAGNOSIS — L97.501 DIABETIC ULCER OF FOOT, LIMITED TO BREAKDOWN OF SKIN (HCC): ICD-10-CM

## 2024-09-05 PROCEDURE — 3079F DIAST BP 80-89 MM HG: CPT | Performed by: FAMILY MEDICINE

## 2024-09-05 PROCEDURE — 99214 OFFICE O/P EST MOD 30 MIN: CPT | Mod: 25 | Performed by: FAMILY MEDICINE

## 2024-09-05 PROCEDURE — 90677 PCV20 VACCINE IM: CPT | Performed by: FAMILY MEDICINE

## 2024-09-05 PROCEDURE — 90471 IMMUNIZATION ADMIN: CPT | Performed by: FAMILY MEDICINE

## 2024-09-05 PROCEDURE — 3075F SYST BP GE 130 - 139MM HG: CPT | Performed by: FAMILY MEDICINE

## 2024-09-05 ASSESSMENT — ENCOUNTER SYMPTOMS
EYE PAIN: 0
PALPITATIONS: 0
CHILLS: 1
EYE REDNESS: 1
COUGH: 0
FEVER: 0
PHOTOPHOBIA: 1
SHORTNESS OF BREATH: 0
EYE DISCHARGE: 0
BLURRED VISION: 1
DIAPHORESIS: 1

## 2024-09-05 ASSESSMENT — FIBROSIS 4 INDEX: FIB4 SCORE: 0.64

## 2024-09-05 NOTE — ASSESSMENT & PLAN NOTE
Currently transitioned to oral antibiotics after IV antibiotics via picc   ID is following has normal crp  Followed by orthopedic specialist

## 2024-09-05 NOTE — ASSESSMENT & PLAN NOTE
Dec sensation to monofilament testing,   Normal cap refill  Has small fissures on skin, no open ulcers.   Continues on metformin 500mg bid

## 2024-09-05 NOTE — PROGRESS NOTES
Subjective     Chuy Moody is a 59 y.o. male who presents with Diabetes (Test 4 xdaily  ) and Lab Results (Labs completed 08 29 2024)        59-year-old male with a history diabetes diagnosed in March 2024 on insulin with history of foot ulcers and osteomyelitis of the right clavicle. He is being followed by infectious disease and has since transitioned from PICC line antibiotics to oral antibiotics. Patient reports his foot ulcer has since resolved and his right clavicle is healing well. He had his diabetic eye exam yesterday. He was told he has macular edema. He had an injection for this in his left eye and notes some redness from it. They will do the injection in the right eye in October 2024. Lantus 22 units at bedtime. He uses Novolog as needed for highs. He reports last night he had chills and was sweating.          Review of Systems   Constitutional:  Positive for chills and diaphoresis. Negative for fever.   Eyes:  Positive for blurred vision, photophobia and redness. Negative for pain and discharge.   Respiratory:  Negative for cough and shortness of breath.    Cardiovascular:  Negative for chest pain and palpitations.              Objective     /82 (BP Location: Left arm, Patient Position: Sitting, BP Cuff Size: Adult)   Pulse 75   Temp 35.9 °C (96.6 °F) (Temporal)   Resp 16   Ht 1.829 m (6')   Wt 98 kg (216 lb)   SpO2 99%   BMI 29.29 kg/m²      Physical Exam  Constitutional:       General: He is not in acute distress.     Appearance: Normal appearance. He is not ill-appearing or toxic-appearing.   HENT:      Head: Atraumatic.   Eyes:      Comments: Left eye with injection secondary to eye injection.   Cardiovascular:      Rate and Rhythm: Normal rate and regular rhythm.   Pulmonary:      Effort: Pulmonary effort is normal.      Breath sounds: Normal breath sounds.   Skin:     Comments: Bilateral feet are dry with scaling. There is a small open fissure between the left great toe and 2nd  toe. Also a small fissure open on the tip of his 2nd toe. Pulses are difficult to palpate but cap refill < 2 seconds and perfusion appears good. Decreased sensation to the bilateral feet.   Neurological:      General: No focal deficit present.      Mental Status: He is alert.   Psychiatric:         Mood and Affect: Mood normal.         Behavior: Behavior normal.         Thought Content: Thought content normal.                             Assessment & Plan     #1 Diabetes  - Reviewed hospital records.  - C-peptide ordered to confirm type 2 vs type 1 diabetes.  - A1c was 6.1% on 8/29/24  - Continue Lantus and Novolog.  - Reviewed labs. Creatinine 0.49 and eGFR 114.  - Decreased sensation on monofilament exam.   - Patient just had diabetic eye exam yesterday with injection for macular edema in his left eye.  - Will repeat Ha1c in 6 months.    #2 Dyslipidemia  - LDL was 146 on 8/29/24.  - Patient would like to try lifestyle modifications before starting lipid lowering medications.  - Will recheck lipid profile in 6 months.    #3 Osteomyelitis of the right clavicle bone  - Reviewed hospital records.  - Patient has transitioned off PICC line to oral antibiotics.  - Followed by orthopedics and infectious disease.         Assessment & Plan

## 2024-09-15 DIAGNOSIS — I10 ESSENTIAL HYPERTENSION: ICD-10-CM

## 2024-09-17 NOTE — TELEPHONE ENCOUNTER
Received request via: Patient    Was the patient seen in the last year in this department? Yes    Does the patient have an active prescription (recently filled or refills available) for medication(s) requested? No    Pharmacy Name: [facility]    Does the patient have [facility] Plus and need 100-day supply? (This applies to ALL medications) Patient does not have SCP

## 2024-09-18 RX ORDER — AMLODIPINE BESYLATE 5 MG/1
5 TABLET ORAL 2 TIMES DAILY
Qty: 180 TABLET | Refills: 3 | Status: SHIPPED | OUTPATIENT
Start: 2024-09-18

## 2024-09-18 RX ORDER — CARVEDILOL 25 MG/1
25 TABLET ORAL 2 TIMES DAILY WITH MEALS
Qty: 180 TABLET | Refills: 3 | Status: SHIPPED | OUTPATIENT
Start: 2024-09-18

## 2024-10-06 ENCOUNTER — PATIENT MESSAGE (OUTPATIENT)
Dept: MEDICAL GROUP | Facility: PHYSICIAN GROUP | Age: 59
End: 2024-10-06
Payer: COMMERCIAL

## 2024-10-09 ENCOUNTER — TELEPHONE (OUTPATIENT)
Dept: INTERNAL MEDICINE | Facility: IMAGING CENTER | Age: 59
End: 2024-10-09
Payer: COMMERCIAL

## 2024-10-29 RX ORDER — AMLODIPINE BESYLATE 5 MG/1
5 TABLET ORAL 2 TIMES DAILY
Qty: 180 TABLET | Refills: 3 | Status: SHIPPED | OUTPATIENT
Start: 2024-10-29

## 2024-11-05 NOTE — TELEPHONE ENCOUNTER
Received request via: Pharmacy    Was the patient seen in the last year in this department? Yes    Does the patient have an active prescription (recently filled or refills available) for medication(s) requested? No    Pharmacy Name: [facility]      Does the patient have [facility] Plus and need 100-day supply? (This applies to ALL medications) Patient does not have SCP

## 2024-11-14 DIAGNOSIS — I10 ESSENTIAL HYPERTENSION: ICD-10-CM

## 2024-11-15 NOTE — TELEPHONE ENCOUNTER
Received request via: Patient    Was the patient seen in the last year in this department? Yes    Does the patient have an active prescription (recently filled or refills available) for medication(s) requested? No    [facility]    Does the patient have [facility] Plus and need 100-day supply? (This applies to ALL medications) Patient does not have SCP

## 2024-11-19 RX ORDER — CARVEDILOL 25 MG/1
25 TABLET ORAL 2 TIMES DAILY WITH MEALS
Qty: 180 TABLET | Refills: 3 | Status: SHIPPED | OUTPATIENT
Start: 2024-11-19

## 2024-11-19 RX ORDER — APIXABAN 5 MG/1
5 TABLET, FILM COATED ORAL 2 TIMES DAILY
Qty: 180 TABLET | Refills: 3 | Status: SHIPPED | OUTPATIENT
Start: 2024-11-19

## 2024-11-19 RX ORDER — LANCETS 30 GAUGE
EACH MISCELLANEOUS
Qty: 300 EACH | Refills: 4 | Status: SHIPPED | OUTPATIENT
Start: 2024-11-19

## 2024-11-19 RX ORDER — POTASSIUM CHLORIDE 750 MG/1
10 TABLET, EXTENDED RELEASE ORAL DAILY
Qty: 90 TABLET | Refills: 3 | Status: SHIPPED | OUTPATIENT
Start: 2024-11-19

## 2024-11-19 RX ORDER — FUROSEMIDE 40 MG/1
40 TABLET ORAL DAILY
Qty: 90 TABLET | Refills: 3 | Status: SHIPPED | OUTPATIENT
Start: 2024-11-19

## 2024-11-19 RX ORDER — AMLODIPINE BESYLATE 5 MG/1
5 TABLET ORAL 2 TIMES DAILY
Qty: 180 TABLET | Refills: 3 | Status: SHIPPED | OUTPATIENT
Start: 2024-11-19

## 2025-01-07 ENCOUNTER — TELEPHONE (OUTPATIENT)
Dept: URGENT CARE | Facility: PHYSICIAN GROUP | Age: 60
End: 2025-01-07
Payer: COMMERCIAL

## 2025-01-08 ENCOUNTER — HOSPITAL ENCOUNTER (OUTPATIENT)
Dept: LAB | Facility: MEDICAL CENTER | Age: 60
End: 2025-01-08
Attending: INTERNAL MEDICINE
Payer: COMMERCIAL

## 2025-01-08 LAB
ALBUMIN SERPL BCP-MCNC: 4.5 G/DL (ref 3.2–4.9)
ALBUMIN/GLOB SERPL: 2 G/DL
ALP SERPL-CCNC: 55 U/L (ref 30–99)
ALT SERPL-CCNC: 23 U/L (ref 2–50)
ANION GAP SERPL CALC-SCNC: 8 MMOL/L (ref 7–16)
AST SERPL-CCNC: 21 U/L (ref 12–45)
BASOPHILS # BLD AUTO: 1.1 % (ref 0–1.8)
BASOPHILS # BLD: 0.06 K/UL (ref 0–0.12)
BILIRUB SERPL-MCNC: 0.4 MG/DL (ref 0.1–1.5)
BUN SERPL-MCNC: 19 MG/DL (ref 8–22)
CALCIUM ALBUM COR SERPL-MCNC: 9.2 MG/DL (ref 8.5–10.5)
CALCIUM SERPL-MCNC: 9.6 MG/DL (ref 8.5–10.5)
CHLORIDE SERPL-SCNC: 104 MMOL/L (ref 96–112)
CO2 SERPL-SCNC: 27 MMOL/L (ref 20–33)
CREAT SERPL-MCNC: 0.75 MG/DL (ref 0.5–1.4)
CRP SERPL HS-MCNC: <0.3 MG/DL (ref 0–0.75)
EOSINOPHIL # BLD AUTO: 0.4 K/UL (ref 0–0.51)
EOSINOPHIL NFR BLD: 7 % (ref 0–6.9)
ERYTHROCYTE [DISTWIDTH] IN BLOOD BY AUTOMATED COUNT: 40.5 FL (ref 35.9–50)
ERYTHROCYTE [SEDIMENTATION RATE] IN BLOOD BY WESTERGREN METHOD: 5 MM/HOUR (ref 0–20)
GFR SERPLBLD CREATININE-BSD FMLA CKD-EPI: 102 ML/MIN/1.73 M 2
GLOBULIN SER CALC-MCNC: 2.2 G/DL (ref 1.9–3.5)
GLUCOSE SERPL-MCNC: 102 MG/DL (ref 65–99)
HCT VFR BLD AUTO: 40.6 % (ref 42–52)
HGB BLD-MCNC: 13.6 G/DL (ref 14–18)
IMM GRANULOCYTES # BLD AUTO: 0.02 K/UL (ref 0–0.11)
IMM GRANULOCYTES NFR BLD AUTO: 0.4 % (ref 0–0.9)
LYMPHOCYTES # BLD AUTO: 2.24 K/UL (ref 1–4.8)
LYMPHOCYTES NFR BLD: 39.2 % (ref 22–41)
MCH RBC QN AUTO: 28.9 PG (ref 27–33)
MCHC RBC AUTO-ENTMCNC: 33.5 G/DL (ref 32.3–36.5)
MCV RBC AUTO: 86.4 FL (ref 81.4–97.8)
MONOCYTES # BLD AUTO: 0.59 K/UL (ref 0–0.85)
MONOCYTES NFR BLD AUTO: 10.3 % (ref 0–13.4)
NEUTROPHILS # BLD AUTO: 2.4 K/UL (ref 1.82–7.42)
NEUTROPHILS NFR BLD: 42 % (ref 44–72)
NRBC # BLD AUTO: 0 K/UL
NRBC BLD-RTO: 0 /100 WBC (ref 0–0.2)
PLATELET # BLD AUTO: 314 K/UL (ref 164–446)
PMV BLD AUTO: 11.1 FL (ref 9–12.9)
POTASSIUM SERPL-SCNC: 4.5 MMOL/L (ref 3.6–5.5)
PROT SERPL-MCNC: 6.7 G/DL (ref 6–8.2)
RBC # BLD AUTO: 4.7 M/UL (ref 4.7–6.1)
SODIUM SERPL-SCNC: 139 MMOL/L (ref 135–145)
WBC # BLD AUTO: 5.7 K/UL (ref 4.8–10.8)

## 2025-01-08 PROCEDURE — 86140 C-REACTIVE PROTEIN: CPT

## 2025-01-08 PROCEDURE — 36415 COLL VENOUS BLD VENIPUNCTURE: CPT

## 2025-01-08 PROCEDURE — 80053 COMPREHEN METABOLIC PANEL: CPT

## 2025-01-08 PROCEDURE — 85652 RBC SED RATE AUTOMATED: CPT

## 2025-01-08 PROCEDURE — 85025 COMPLETE CBC W/AUTO DIFF WBC: CPT

## 2025-01-08 NOTE — TELEPHONE ENCOUNTER
Received request via: Patient    Was the patient seen in the last year in this department? Yes    Does the patient have an active prescription (recently filled or refills available) for medication(s) requested? No    [facility]     Does the patient have [facility] Plus and need 100-day supply? (This applies to ALL medications) Patient does not have SCP

## 2025-01-09 DIAGNOSIS — R19.5 POSITIVE COLORECTAL CANCER SCREENING USING COLOGUARD TEST: ICD-10-CM

## 2025-01-09 RX ORDER — INSULIN GLARGINE 100 [IU]/ML
INJECTION, SOLUTION SUBCUTANEOUS
Qty: 9 ML | Refills: 3 | Status: SHIPPED | OUTPATIENT
Start: 2025-01-09 | End: 2025-01-17 | Stop reason: SDUPTHER

## 2025-01-09 RX ORDER — INSULIN ASPART 100 [IU]/ML
1-10 INJECTION, SOLUTION INTRAVENOUS; SUBCUTANEOUS
Qty: 9 ML | Refills: 3 | Status: SHIPPED | OUTPATIENT
Start: 2025-01-09 | End: 2025-01-10 | Stop reason: SDUPTHER

## 2025-01-10 RX ORDER — INSULIN ASPART 100 [IU]/ML
1-10 INJECTION, SOLUTION INTRAVENOUS; SUBCUTANEOUS
Qty: 9 ML | Refills: 3 | Status: SHIPPED | OUTPATIENT
Start: 2025-01-10

## 2025-01-10 NOTE — TELEPHONE ENCOUNTER
Received request via: Patient    Was the patient seen in the last year in this department? Yes    Does the patient have an active prescription (recently filled or refills available) for medication(s) requested? No    [facility]     Does the patient have [facility] and need 100-day supply? (This applies to ALL medications) Patient does not have SCP

## 2025-01-11 DIAGNOSIS — I10 ESSENTIAL HYPERTENSION: ICD-10-CM

## 2025-01-14 RX ORDER — LISINOPRIL 40 MG/1
40 TABLET ORAL DAILY
Qty: 90 TABLET | Refills: 3 | Status: SHIPPED | OUTPATIENT
Start: 2025-01-14

## 2025-01-20 NOTE — TELEPHONE ENCOUNTER
Received request via: Pharmacy    Was the patient seen in the last year in this department? Yes    Does the patient have an active prescription (recently filled or refills available) for medication(s) requested? No    Pharmacy Name:    Does the patient have [facility] and need 100-day supply? (This applies to ALL medications) Patient does not have [facility]      Last seen-  9-5-24  Last labs-- 1-8-25

## 2025-01-21 RX ORDER — INSULIN GLARGINE 100 [IU]/ML
INJECTION, SOLUTION SUBCUTANEOUS
Qty: 9 ML | Refills: 3 | Status: SHIPPED | OUTPATIENT
Start: 2025-01-21

## 2025-01-31 ENCOUNTER — HOSPITAL ENCOUNTER (OUTPATIENT)
Dept: LAB | Facility: MEDICAL CENTER | Age: 60
End: 2025-01-31
Attending: FAMILY MEDICINE
Payer: COMMERCIAL

## 2025-01-31 DIAGNOSIS — L97.501 DIABETIC ULCER OF FOOT, LIMITED TO BREAKDOWN OF SKIN (HCC): ICD-10-CM

## 2025-01-31 DIAGNOSIS — Z79.4 TYPE 2 DIABETES MELLITUS WITH OTHER SPECIFIED COMPLICATION, WITH LONG-TERM CURRENT USE OF INSULIN (HCC): ICD-10-CM

## 2025-01-31 DIAGNOSIS — E11.621 DIABETIC ULCER OF FOOT, LIMITED TO BREAKDOWN OF SKIN (HCC): ICD-10-CM

## 2025-01-31 DIAGNOSIS — M86.111: ICD-10-CM

## 2025-01-31 DIAGNOSIS — E11.69 TYPE 2 DIABETES MELLITUS WITH OTHER SPECIFIED COMPLICATION, WITH LONG-TERM CURRENT USE OF INSULIN (HCC): ICD-10-CM

## 2025-01-31 LAB
ALBUMIN SERPL BCP-MCNC: 4.5 G/DL (ref 3.2–4.9)
ALBUMIN/GLOB SERPL: 1.9 G/DL
ALP SERPL-CCNC: 56 U/L (ref 30–99)
ALT SERPL-CCNC: 23 U/L (ref 2–50)
ANION GAP SERPL CALC-SCNC: 9 MMOL/L (ref 7–16)
AST SERPL-CCNC: 18 U/L (ref 12–45)
BASOPHILS # BLD AUTO: 1 % (ref 0–1.8)
BASOPHILS # BLD: 0.05 K/UL (ref 0–0.12)
BILIRUB SERPL-MCNC: 0.3 MG/DL (ref 0.1–1.5)
BUN SERPL-MCNC: 23 MG/DL (ref 8–22)
CALCIUM ALBUM COR SERPL-MCNC: 9.2 MG/DL (ref 8.5–10.5)
CALCIUM SERPL-MCNC: 9.6 MG/DL (ref 8.5–10.5)
CHLORIDE SERPL-SCNC: 107 MMOL/L (ref 96–112)
CHOLEST SERPL-MCNC: 229 MG/DL (ref 100–199)
CO2 SERPL-SCNC: 25 MMOL/L (ref 20–33)
CREAT SERPL-MCNC: 0.77 MG/DL (ref 0.5–1.4)
EOSINOPHIL # BLD AUTO: 0.35 K/UL (ref 0–0.51)
EOSINOPHIL NFR BLD: 6.8 % (ref 0–6.9)
ERYTHROCYTE [DISTWIDTH] IN BLOOD BY AUTOMATED COUNT: 40 FL (ref 35.9–50)
EST. AVERAGE GLUCOSE BLD GHB EST-MCNC: 123 MG/DL
FASTING STATUS PATIENT QL REPORTED: NORMAL
GFR SERPLBLD CREATININE-BSD FMLA CKD-EPI: 101 ML/MIN/1.73 M 2
GLOBULIN SER CALC-MCNC: 2.4 G/DL (ref 1.9–3.5)
GLUCOSE SERPL-MCNC: 122 MG/DL (ref 65–99)
HBA1C MFR BLD: 5.9 % (ref 4–5.6)
HCT VFR BLD AUTO: 42.5 % (ref 42–52)
HDLC SERPL-MCNC: 56 MG/DL
HGB BLD-MCNC: 14.3 G/DL (ref 14–18)
IMM GRANULOCYTES # BLD AUTO: 0 K/UL (ref 0–0.11)
IMM GRANULOCYTES NFR BLD AUTO: 0 % (ref 0–0.9)
LDLC SERPL CALC-MCNC: 152 MG/DL
LYMPHOCYTES # BLD AUTO: 2.43 K/UL (ref 1–4.8)
LYMPHOCYTES NFR BLD: 47.5 % (ref 22–41)
MCH RBC QN AUTO: 29.2 PG (ref 27–33)
MCHC RBC AUTO-ENTMCNC: 33.6 G/DL (ref 32.3–36.5)
MCV RBC AUTO: 86.7 FL (ref 81.4–97.8)
MONOCYTES # BLD AUTO: 0.56 K/UL (ref 0–0.85)
MONOCYTES NFR BLD AUTO: 10.9 % (ref 0–13.4)
NEUTROPHILS # BLD AUTO: 1.73 K/UL (ref 1.82–7.42)
NEUTROPHILS NFR BLD: 33.8 % (ref 44–72)
NRBC # BLD AUTO: 0 K/UL
NRBC BLD-RTO: 0 /100 WBC (ref 0–0.2)
PLATELET # BLD AUTO: 326 K/UL (ref 164–446)
PMV BLD AUTO: 10.1 FL (ref 9–12.9)
POTASSIUM SERPL-SCNC: 4.4 MMOL/L (ref 3.6–5.5)
PROT SERPL-MCNC: 6.9 G/DL (ref 6–8.2)
RBC # BLD AUTO: 4.9 M/UL (ref 4.7–6.1)
SODIUM SERPL-SCNC: 141 MMOL/L (ref 135–145)
TRIGL SERPL-MCNC: 105 MG/DL (ref 0–149)
WBC # BLD AUTO: 5.1 K/UL (ref 4.8–10.8)

## 2025-01-31 PROCEDURE — 83036 HEMOGLOBIN GLYCOSYLATED A1C: CPT

## 2025-01-31 PROCEDURE — 36415 COLL VENOUS BLD VENIPUNCTURE: CPT

## 2025-01-31 PROCEDURE — 80061 LIPID PANEL: CPT

## 2025-01-31 PROCEDURE — 84681 ASSAY OF C-PEPTIDE: CPT

## 2025-01-31 PROCEDURE — 80053 COMPREHEN METABOLIC PANEL: CPT

## 2025-01-31 PROCEDURE — 85025 COMPLETE CBC W/AUTO DIFF WBC: CPT

## 2025-02-03 LAB — C PEPTIDE SERPL-MCNC: 1.5 NG/ML (ref 0.5–3.3)

## 2025-03-06 ENCOUNTER — HOSPITAL ENCOUNTER (OUTPATIENT)
Dept: LAB | Facility: MEDICAL CENTER | Age: 60
End: 2025-03-06
Attending: INTERNAL MEDICINE
Payer: COMMERCIAL

## 2025-03-06 LAB
ALBUMIN SERPL BCP-MCNC: 4.4 G/DL (ref 3.2–4.9)
ALBUMIN/GLOB SERPL: 1.8 G/DL
ALP SERPL-CCNC: 57 U/L (ref 30–99)
ALT SERPL-CCNC: 22 U/L (ref 2–50)
ANION GAP SERPL CALC-SCNC: 9 MMOL/L (ref 7–16)
AST SERPL-CCNC: 18 U/L (ref 12–45)
BASOPHILS # BLD AUTO: 1.1 % (ref 0–1.8)
BASOPHILS # BLD: 0.06 K/UL (ref 0–0.12)
BILIRUB SERPL-MCNC: 0.3 MG/DL (ref 0.1–1.5)
BUN SERPL-MCNC: 22 MG/DL (ref 8–22)
CALCIUM ALBUM COR SERPL-MCNC: 8.9 MG/DL (ref 8.5–10.5)
CALCIUM SERPL-MCNC: 9.2 MG/DL (ref 8.5–10.5)
CHLORIDE SERPL-SCNC: 106 MMOL/L (ref 96–112)
CO2 SERPL-SCNC: 26 MMOL/L (ref 20–33)
CREAT SERPL-MCNC: 0.83 MG/DL (ref 0.5–1.4)
CRP SERPL HS-MCNC: <0.3 MG/DL (ref 0–0.75)
EOSINOPHIL # BLD AUTO: 0.42 K/UL (ref 0–0.51)
EOSINOPHIL NFR BLD: 7.9 % (ref 0–6.9)
ERYTHROCYTE [DISTWIDTH] IN BLOOD BY AUTOMATED COUNT: 42.7 FL (ref 35.9–50)
GFR SERPLBLD CREATININE-BSD FMLA CKD-EPI: 99 ML/MIN/1.73 M 2
GLOBULIN SER CALC-MCNC: 2.5 G/DL (ref 1.9–3.5)
GLUCOSE SERPL-MCNC: 115 MG/DL (ref 65–99)
HCT VFR BLD AUTO: 43.2 % (ref 42–52)
HGB BLD-MCNC: 13.8 G/DL (ref 14–18)
IMM GRANULOCYTES # BLD AUTO: 0.01 K/UL (ref 0–0.11)
IMM GRANULOCYTES NFR BLD AUTO: 0.2 % (ref 0–0.9)
LYMPHOCYTES # BLD AUTO: 2.17 K/UL (ref 1–4.8)
LYMPHOCYTES NFR BLD: 40.9 % (ref 22–41)
MCH RBC QN AUTO: 28.6 PG (ref 27–33)
MCHC RBC AUTO-ENTMCNC: 31.9 G/DL (ref 32.3–36.5)
MCV RBC AUTO: 89.4 FL (ref 81.4–97.8)
MONOCYTES # BLD AUTO: 0.58 K/UL (ref 0–0.85)
MONOCYTES NFR BLD AUTO: 10.9 % (ref 0–13.4)
NEUTROPHILS # BLD AUTO: 2.06 K/UL (ref 1.82–7.42)
NEUTROPHILS NFR BLD: 39 % (ref 44–72)
NRBC # BLD AUTO: 0 K/UL
NRBC BLD-RTO: 0 /100 WBC (ref 0–0.2)
PLATELET # BLD AUTO: 327 K/UL (ref 164–446)
PMV BLD AUTO: 10.2 FL (ref 9–12.9)
POTASSIUM SERPL-SCNC: 4.2 MMOL/L (ref 3.6–5.5)
PROT SERPL-MCNC: 6.9 G/DL (ref 6–8.2)
RBC # BLD AUTO: 4.83 M/UL (ref 4.7–6.1)
SODIUM SERPL-SCNC: 141 MMOL/L (ref 135–145)
WBC # BLD AUTO: 5.3 K/UL (ref 4.8–10.8)

## 2025-03-06 PROCEDURE — 86140 C-REACTIVE PROTEIN: CPT

## 2025-03-06 PROCEDURE — 36415 COLL VENOUS BLD VENIPUNCTURE: CPT

## 2025-03-06 PROCEDURE — 80053 COMPREHEN METABOLIC PANEL: CPT

## 2025-03-06 PROCEDURE — 85652 RBC SED RATE AUTOMATED: CPT

## 2025-03-06 PROCEDURE — 85025 COMPLETE CBC W/AUTO DIFF WBC: CPT

## 2025-03-07 LAB — ERYTHROCYTE [SEDIMENTATION RATE] IN BLOOD BY WESTERGREN METHOD: 7 MM/HOUR (ref 0–20)

## 2025-03-14 ENCOUNTER — TELEPHONE (OUTPATIENT)
Dept: SCHEDULING | Facility: IMAGING CENTER | Age: 60
End: 2025-03-14
Payer: COMMERCIAL

## 2025-05-03 ENCOUNTER — TELEPHONE (OUTPATIENT)
Dept: URGENT CARE | Facility: PHYSICIAN GROUP | Age: 60
End: 2025-05-03
Payer: COMMERCIAL

## 2025-05-03 NOTE — TELEPHONE ENCOUNTER
----- Message from Physician Brisa Cho M.D. sent at 4/30/2025  1:59 PM PDT -----  Regarding: RE: Labs?  Good question, please reassure pt that based on Jan and March labs, no labs needed before next week's appt. At the appt follow up labs will be ordered.     Brisa Cho M.D.  ----- Message -----  From: Ingris Mccall  Sent: 4/30/2025  12:31 PM PDT  To: Monica Hernandez, Med Ass't; #  Subject: Labs?                                            Hi,    Patient is wondering if labs need to be completed prior to next Wednesday's appt?    Thank you,  Ondina

## 2025-05-05 NOTE — TELEPHONE ENCOUNTER
Received request via: Patient    Was the patient seen in the last year in this department? Yes    Does the patient have an active prescription (recently filled or refills available) for medication(s) requested? No    [facility]    Does the patient have [facility] and need 100-day supply? (This applies to ALL medications) Patient does not have SCP

## 2025-05-07 ENCOUNTER — APPOINTMENT (OUTPATIENT)
Dept: MEDICAL GROUP | Facility: PHYSICIAN GROUP | Age: 60
End: 2025-05-07
Payer: COMMERCIAL

## 2025-05-07 VITALS
SYSTOLIC BLOOD PRESSURE: 122 MMHG | BODY MASS INDEX: 31.29 KG/M2 | DIASTOLIC BLOOD PRESSURE: 78 MMHG | TEMPERATURE: 97.1 F | HEIGHT: 72 IN | HEART RATE: 74 BPM | RESPIRATION RATE: 16 BRPM | OXYGEN SATURATION: 98 % | WEIGHT: 231 LBS

## 2025-05-07 DIAGNOSIS — E11.69 TYPE 2 DIABETES MELLITUS WITH OTHER SPECIFIED COMPLICATION, WITH LONG-TERM CURRENT USE OF INSULIN (HCC): ICD-10-CM

## 2025-05-07 DIAGNOSIS — D64.9 ANEMIA, UNSPECIFIED TYPE: ICD-10-CM

## 2025-05-07 DIAGNOSIS — Z12.5 SCREENING FOR MALIGNANT NEOPLASM OF PROSTATE: ICD-10-CM

## 2025-05-07 DIAGNOSIS — Z79.4 TYPE 2 DIABETES MELLITUS WITH OTHER SPECIFIED COMPLICATION, WITH LONG-TERM CURRENT USE OF INSULIN (HCC): ICD-10-CM

## 2025-05-07 PROBLEM — M00.9: Status: RESOLVED | Noted: 2024-05-24 | Resolved: 2025-05-07

## 2025-05-07 PROBLEM — M86.111: Status: RESOLVED | Noted: 2024-05-26 | Resolved: 2025-05-07

## 2025-05-07 PROBLEM — R09.02 HYPOXIA: Status: RESOLVED | Noted: 2024-05-24 | Resolved: 2025-05-07

## 2025-05-07 PROBLEM — J90 PLEURAL EFFUSION ON RIGHT: Status: RESOLVED | Noted: 2024-05-25 | Resolved: 2025-05-07

## 2025-05-07 PROCEDURE — 3074F SYST BP LT 130 MM HG: CPT | Performed by: FAMILY MEDICINE

## 2025-05-07 PROCEDURE — 99214 OFFICE O/P EST MOD 30 MIN: CPT | Performed by: FAMILY MEDICINE

## 2025-05-07 PROCEDURE — 3078F DIAST BP <80 MM HG: CPT | Performed by: FAMILY MEDICINE

## 2025-05-07 RX ORDER — ROSUVASTATIN CALCIUM 20 MG/1
20 TABLET, COATED ORAL EVERY EVENING
Qty: 100 TABLET | Refills: 3 | Status: SHIPPED | OUTPATIENT
Start: 2025-05-07 | End: 2026-06-11

## 2025-05-07 ASSESSMENT — FIBROSIS 4 INDEX: FIB4 SCORE: 0.73
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
## 2025-06-14 DIAGNOSIS — I10 ESSENTIAL HYPERTENSION: ICD-10-CM

## 2025-06-17 RX ORDER — CARVEDILOL 25 MG/1
25 TABLET ORAL 2 TIMES DAILY WITH MEALS
Qty: 180 TABLET | Refills: 3 | Status: SHIPPED | OUTPATIENT
Start: 2025-06-17

## 2025-06-17 RX ORDER — AMLODIPINE BESYLATE 5 MG/1
5 TABLET ORAL 2 TIMES DAILY
Qty: 180 TABLET | Refills: 3 | Status: SHIPPED | OUTPATIENT
Start: 2025-06-17

## 2025-06-17 NOTE — TELEPHONE ENCOUNTER
Received request via: Patient    Was the patient seen in the last year in this department? Yes    Does the patient have an active prescription (recently filled or refills available) for medication(s) requested? No    [facility]    Does the patient have [facility] and need 100-day supply? (This applies to ALL medications) Patient does not have SCP

## 2025-08-22 RX ORDER — AVOBENZONE, HOMOSALATE, OCTISALATE, OCTOCRYLENE 30; 40; 45; 26 MG/ML; MG/ML; MG/ML; MG/ML
CREAM TOPICAL
Qty: 300 EACH | Refills: 4 | Status: SHIPPED | OUTPATIENT
Start: 2025-08-22

## (undated) DEVICE — GLOVE BIOGEL SZ 7 SURGICAL PF LTX - (50PR/BX 4BX/CA)

## (undated) DEVICE — GLOVE BIOGEL INDICATOR SZ 7.5 SURGICAL PF LTX - (50PR/BX 4BX/CA)

## (undated) DEVICE — SUTURE 2-0 MONOCRYL PLUS UNDYED CT-1 1 X 36 (36EA/BX)"

## (undated) DEVICE — SUTURE GENERAL

## (undated) DEVICE — GOWN WARMING STANDARD FLEX - (30/CA)

## (undated) DEVICE — ELECTRODE DUAL RETURN W/ CORD - (50/PK)

## (undated) DEVICE — SENSOR OXIMETER ADULT SPO2 RD SET (20EA/BX)

## (undated) DEVICE — STAPLER 35MM SKIN WIDE ROTATING HEAD (6EA/BX)

## (undated) DEVICE — COVER LIGHT HANDLE ALC PLUS DISP (18EA/BX)

## (undated) DEVICE — LACTATED RINGERS INJ 1000 ML - (14EA/CA 60CA/PF)

## (undated) DEVICE — SWAB ANAEROBIC SPEC.COLLECTOR - (25/PK 4PK/CA 100EA/CA)

## (undated) DEVICE — DRAPE SURGICAL U 77X120 - (10/CA)

## (undated) DEVICE — CHLORAPREP 26 ML APPLICATOR - ORANGE TINT(25/CA)

## (undated) DEVICE — SET EXTENSION WITH 2 PORTS (48EA/CA) ***PART #2C8610 IS A SUBSTITUTE*****

## (undated) DEVICE — SUCTION INSTRUMENT YANKAUER BULBOUS TIP W/O VENT (50EA/CA)

## (undated) DEVICE — SET LEADWIRE 5 LEAD BEDSIDE DISPOSABLE ECG (1SET OF 5/EA)

## (undated) DEVICE — SODIUM CHL. IRRIGATION 0.9% 3000ML (4EA/CA 65CA/PF)

## (undated) DEVICE — CUP DENTURE W/ LID - (200/CA)

## (undated) DEVICE — PADDING CAST 6 IN STERILE - 6 X 4 YDS (24/CA)

## (undated) DEVICE — PAD LAP STERILE 18 X 18 - (5/PK 40PK/CA)

## (undated) DEVICE — SLEEVE, VASO, THIGH, MED

## (undated) DEVICE — CANISTER SUCTION 3000ML MECHANICAL FILTER AUTO SHUTOFF MEDI-VAC NONSTERILE LF DISP  (40EA/CA)

## (undated) DEVICE — DRAIN PENROSE STERILE 1/4 X - 18 IN  (25EA/BX)

## (undated) DEVICE — PACK LOWER EXTREMITY - (2/CA)

## (undated) DEVICE — SODIUM CHL IRRIGATION 0.9% 1000ML (12EA/CA)

## (undated) DEVICE — SUTURE 3-0 ETHILON FSLX 30 (36PK/BX)"

## (undated) DEVICE — TUBING CLEARLINK DUO-VENT - C-FLO (48EA/CA)

## (undated) DEVICE — PADDING CAST 4 IN STERILE - 4 X 4 YDS (24/CA)